# Patient Record
Sex: MALE | Race: BLACK OR AFRICAN AMERICAN | NOT HISPANIC OR LATINO | Employment: OTHER | URBAN - METROPOLITAN AREA
[De-identification: names, ages, dates, MRNs, and addresses within clinical notes are randomized per-mention and may not be internally consistent; named-entity substitution may affect disease eponyms.]

---

## 2017-10-06 ENCOUNTER — TRANSCRIBE ORDERS (OUTPATIENT)
Dept: ADMINISTRATIVE | Facility: HOSPITAL | Age: 77
End: 2017-10-06

## 2017-10-06 ENCOUNTER — LAB (OUTPATIENT)
Dept: LAB | Facility: HOSPITAL | Age: 77
End: 2017-10-06

## 2017-10-06 DIAGNOSIS — A74.9 CHLAMYDIA: ICD-10-CM

## 2017-10-06 DIAGNOSIS — A74.9 CHLAMYDIA: Primary | ICD-10-CM

## 2017-10-06 PROCEDURE — 87491 CHLMYD TRACH DNA AMP PROBE: CPT

## 2017-10-06 PROCEDURE — 87798 DETECT AGENT NOS DNA AMP: CPT

## 2017-10-06 PROCEDURE — 87591 N.GONORRHOEAE DNA AMP PROB: CPT

## 2017-10-10 LAB
C TRACH RRNA SPEC DONR QL NAA+PROBE: NEGATIVE
CONV COMMENT: NORMAL
MYCOPLASMA GENITALIUM NA: NEGATIVE
N GONORRHOEA DNA SPEC QL NAA+PROBE: NEGATIVE

## 2018-03-12 ENCOUNTER — LAB (OUTPATIENT)
Dept: LAB | Facility: HOSPITAL | Age: 78
End: 2018-03-12

## 2018-03-12 ENCOUNTER — CONSULT (OUTPATIENT)
Dept: ONCOLOGY | Facility: CLINIC | Age: 78
End: 2018-03-12

## 2018-03-12 VITALS
RESPIRATION RATE: 16 BRPM | SYSTOLIC BLOOD PRESSURE: 134 MMHG | DIASTOLIC BLOOD PRESSURE: 72 MMHG | OXYGEN SATURATION: 99 % | HEIGHT: 68 IN | BODY MASS INDEX: 28.28 KG/M2 | HEART RATE: 64 BPM | WEIGHT: 186.6 LBS | TEMPERATURE: 99 F

## 2018-03-12 DIAGNOSIS — D47.2 MGUS (MONOCLONAL GAMMOPATHY OF UNKNOWN SIGNIFICANCE): Primary | ICD-10-CM

## 2018-03-12 DIAGNOSIS — D70.8 OTHER NEUTROPENIA (HCC): ICD-10-CM

## 2018-03-12 LAB
B2 MICROGLOB SERPL-MCNC: 1.8 MG/L (ref 0.8–2.2)
BASOPHILS # BLD AUTO: 0.01 10*3/MM3 (ref 0–0.1)
BASOPHILS NFR BLD AUTO: 0.3 % (ref 0–1.1)
DEPRECATED RDW RBC AUTO: 43.1 FL (ref 37–49)
EOSINOPHIL # BLD AUTO: 0.1 10*3/MM3 (ref 0–0.36)
EOSINOPHIL NFR BLD AUTO: 3 % (ref 1–5)
ERYTHROCYTE [DISTWIDTH] IN BLOOD BY AUTOMATED COUNT: 12.4 % (ref 11.7–14.5)
ERYTHROCYTE [SEDIMENTATION RATE] IN BLOOD: 12 MM/HR (ref 0–20)
HCT VFR BLD AUTO: 35 % (ref 40–49)
HGB BLD-MCNC: 12.7 G/DL (ref 13.5–16.5)
IMM GRANULOCYTES # BLD: 0.04 10*3/MM3 (ref 0–0.03)
IMM GRANULOCYTES NFR BLD: 1.2 % (ref 0–0.5)
LYMPHOCYTES # BLD AUTO: 1.35 10*3/MM3 (ref 1–3.5)
LYMPHOCYTES NFR BLD AUTO: 40.7 % (ref 20–49)
MCH RBC QN AUTO: 34.3 PG (ref 27–33)
MCHC RBC AUTO-ENTMCNC: 36.3 G/DL (ref 32–35)
MCV RBC AUTO: 94.6 FL (ref 83–97)
MONOCYTES # BLD AUTO: 0.35 10*3/MM3 (ref 0.25–0.8)
MONOCYTES NFR BLD AUTO: 10.5 % (ref 4–12)
NEUTROPHILS # BLD AUTO: 1.47 10*3/MM3 (ref 1.5–7)
NEUTROPHILS NFR BLD AUTO: 44.3 % (ref 39–75)
NRBC BLD MANUAL-RTO: 0 /100 WBC (ref 0–0)
PLATELET # BLD AUTO: 194 10*3/MM3 (ref 150–375)
PMV BLD AUTO: 9.4 FL (ref 8.9–12.1)
RBC # BLD AUTO: 3.7 10*6/MM3 (ref 4.3–5.5)
WBC NRBC COR # BLD: 3.32 10*3/MM3 (ref 4–10)

## 2018-03-12 PROCEDURE — 36415 COLL VENOUS BLD VENIPUNCTURE: CPT | Performed by: INTERNAL MEDICINE

## 2018-03-12 PROCEDURE — 85652 RBC SED RATE AUTOMATED: CPT | Performed by: INTERNAL MEDICINE

## 2018-03-12 PROCEDURE — 82232 ASSAY OF BETA-2 PROTEIN: CPT | Performed by: INTERNAL MEDICINE

## 2018-03-12 PROCEDURE — 99204 OFFICE O/P NEW MOD 45 MIN: CPT | Performed by: INTERNAL MEDICINE

## 2018-03-12 PROCEDURE — 85025 COMPLETE CBC W/AUTO DIFF WBC: CPT | Performed by: INTERNAL MEDICINE

## 2018-03-12 RX ORDER — ESOMEPRAZOLE MAGNESIUM 40 MG/1
40 CAPSULE, DELAYED RELEASE ORAL
COMMUNITY

## 2018-03-12 RX ORDER — CLOPIDOGREL BISULFATE 75 MG/1
75 TABLET ORAL
COMMUNITY

## 2018-03-12 RX ORDER — PREGABALIN 50 MG/1
50 CAPSULE ORAL DAILY
COMMUNITY

## 2018-03-12 RX ORDER — VALSARTAN AND HYDROCHLOROTHIAZIDE 320; 25 MG/1; MG/1
1 TABLET, FILM COATED ORAL DAILY
COMMUNITY
Start: 2017-12-29 | End: 2020-06-26

## 2018-03-12 RX ORDER — SILDENAFIL CITRATE 20 MG/1
TABLET ORAL
Refills: 3 | COMMUNITY
Start: 2018-02-17

## 2018-03-12 RX ORDER — ATORVASTATIN CALCIUM 40 MG/1
TABLET, FILM COATED ORAL DAILY
COMMUNITY
Start: 2017-12-29

## 2018-03-12 RX ORDER — HYDROCODONE BITARTRATE AND ACETAMINOPHEN 5; 325 MG/1; MG/1
1 TABLET ORAL EVERY 6 HOURS PRN
COMMUNITY

## 2018-03-12 NOTE — PROGRESS NOTES
Subjective     REASON FOR CONSULTATION:  Chronic leukopenia, monoclonal gammopathy  Provide an opinion on any further workup or treatment                             REQUESTING PHYSICIAN:  Eduardo    RECORDS OBTAINED:  Records of the patients history including those obtained from the referring provider were reviewed and summarized in detail.      History of Present Illness   This is a very pleasant 78-year-old man seen previously in our practice by Dr. Jenkins for leukopenia and anemia.  A note from March 2014 also mentions previous finding of IgG paraproteinemia although there are no recent protein studies available in the Epic system.  The patient was last seen by CBC in March 2014.  I did find through our previous [employer] computer system a serum protein electrophoresis from 12/19/2011 showing an M spike 1.4 g/dL IgG kappa.  I could not find previous light chain studies within our records.    The patient returns for continued evaluation.  He states he has been doing well over the past 3 years.  He has had no significant infectious problems.  He denies unusual weight loss or lymphadenopathy.  He denies night sweats.  He remains active.  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  He denies bone pain.    Recent studies with his primary care provider on 2/5/18 showed low IgA 31, low IgM 38 and an IgG of 1457.  The white blood cell count was 2.6 with neutrophils 1253.  Platelet count was normal in the hemoglobin was 12.8.  A protein electrophoresis on 2/8/18 showed a M spike 1.2 g/dL.  Kappa lambda light chain ratio was 23.68.    Past Medical History:   Diagnosis Date   • Arthritis     hands   • CAD (coronary artery disease)    • Diabetes    • GERD (gastroesophageal reflux disease)    • H/O MGUS (monoclonal gammopathy of unknown significance)    • History of colon polyps    • History of herpes zoster 2007   • History of pneumonia    • Hyperlipidemia    • Hypertension    • Neutropenia      "H/O hereditary neutropenia   • Prostate cancer 2007   • Stroke 1997        Past Surgical History:   Procedure Laterality Date   • ANAL FISSURECTOMY     • CATARACT EXTRACTION Bilateral 2009   • COLONOSCOPY  06/2014    cecal polyp, sigmoid diverticulosis   • ENDOSCOPY  2001   • OTHER SURGICAL HISTORY  2007    Decortication and right lower lobe with resection   • PROSTATE BIOPSY  2007   • THORACOSCOPY          No current outpatient prescriptions on file prior to visit.     No current facility-administered medications on file prior to visit.         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        Family History   Problem Relation Age of Onset   • Uterine cancer Mother    • Hypertension Other    • Hyperlipidemia Other    • Arthritis Other    • Stroke Other         Review of Systems   Constitutional: Negative.    HENT: Negative.    Eyes: Negative.    Respiratory: Negative.    Cardiovascular: Negative.    Gastrointestinal: Negative.    Genitourinary: Negative.    Musculoskeletal: Negative.    Skin: Negative.    Allergic/Immunologic: Negative.    Neurological: Negative.    Hematological: Negative.    Psychiatric/Behavioral: Negative.           Objective     Vitals:    03/12/18 1526   BP: 134/72   Pulse: 64   Resp: 16   Temp: 99 °F (37.2 °C)   TempSrc: Oral   SpO2: 99%   Weight: 84.6 kg (186 lb 9.6 oz)   Height: 173 cm (68.11\")   PainSc: 5  Comment: Rt shoulder pain.   PainLoc: Shoulder     Current Status 3/12/2018   ECOG score 0 "       Physical Exam    Con: pleasant, well-appearing man  HEENT: no icterus, no thrush, moist membranes  CV: RRR, S1S2  RESP: CTA bilat, no wheezing  GI: soft, nontender, no splenomegaly, +BS  MS: no edema  SKIN: no petechiae or bruising  NEURO: alert and oriented x3, normal strength, normal muscle tone  PSYCH: normal mood    RECENT LABS:  Hematology WBC   Date Value Ref Range Status   03/12/2018 3.32 (L) 4.00 - 10.00 10*3/mm3 Final     RBC   Date Value Ref Range Status   03/12/2018 3.70 (L) 4.30 - 5.50 10*6/mm3 Final     Hemoglobin   Date Value Ref Range Status   03/12/2018 12.7 (L) 13.5 - 16.5 g/dL Final     Hematocrit   Date Value Ref Range Status   03/12/2018 35.0 (L) 40.0 - 49.0 % Final     Platelets   Date Value Ref Range Status   03/12/2018 194 150 - 375 10*3/mm3 Final        Lab Results   Component Value Date    GLUCOSE 152 (H) 07/17/2015    BUN 32 (H) 07/17/2015    CREATININE 1.46 (H) 07/17/2015    K 4.0 07/17/2015    CO2 19 (L) 07/17/2015    CALCIUM 8.5 (L) 07/17/2015    ALBUMIN 3.6 07/15/2015    AST 61 (H) 07/15/2015    ALT 58 (H) 07/15/2015         Assessment/Plan     1.  Chronic leukopenia: The patient's ANC has run borderline low for a number of years.  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  The current ANC is 1.47, similar to his previous values.  He is having no infectious complications.    4.  MGUS: This was a previous finding on the patient.  A protein electrophoresis from December 2011 showed an M spike 1.4 g/dL IgG kappa.  Most recent protein electrophoresis from his PCP showed an M spike 1.2 g/dL.  Kappa lambda ratio was elevated 23.68 and I could find no comparison in the computer system historically.  Given the stable M spike, this is likely a monoclonal gammopathy of undetermined significance.  Recommended that we further assess with repeat protein led to pheresis, immunofixation, 24 hour  urine for protein and immunofixation, and a skeletal survey.  I will also check a CMP and beta-2 microglobulin today.      3.  Mild normocytic anemia: It appears the patient has stage III chronic kidney disease as the likely cause     I will see the patient back in 2-3 weeks to review results.

## 2018-03-13 LAB
ALBUMIN SERPL-MCNC: 4.1 G/DL (ref 2.9–4.4)
ALBUMIN/GLOB SERPL: 1.3 {RATIO} (ref 0.7–1.7)
ALPHA1 GLOB FLD ELPH-MCNC: 0.2 G/DL (ref 0–0.4)
ALPHA2 GLOB SERPL ELPH-MCNC: 0.5 G/DL (ref 0.4–1)
B-GLOBULIN SERPL ELPH-MCNC: 0.9 G/DL (ref 0.7–1.3)
GAMMA GLOB SERPL ELPH-MCNC: 1.5 G/DL (ref 0.4–1.8)
GLOBULIN SER CALC-MCNC: 3.1 G/DL (ref 2.2–3.9)
IGA SERPL-MCNC: 24 MG/DL (ref 61–437)
IGG SERPL-MCNC: 1548 MG/DL (ref 700–1600)
IGM SERPL-MCNC: 41 MG/DL (ref 15–143)
KAPPA LC SERPL-MCNC: 26.9 MG/L (ref 3.3–19.4)
KAPPA LC/LAMBDA SER: 3.96 {RATIO} (ref 0.26–1.65)
LAMBDA LC FREE SERPL-MCNC: 6.8 MG/L (ref 5.7–26.3)
Lab: ABNORMAL
M-SPIKE: 1.4 G/DL
PROT PATTERN SERPL IFE-IMP: ABNORMAL
PROT SERPL-MCNC: 7.2 G/DL (ref 6–8.5)

## 2018-03-14 LAB — REF LAB TEST METHOD: NORMAL

## 2018-04-02 ENCOUNTER — APPOINTMENT (OUTPATIENT)
Dept: ONCOLOGY | Facility: CLINIC | Age: 78
End: 2018-04-02

## 2018-04-02 ENCOUNTER — APPOINTMENT (OUTPATIENT)
Dept: LAB | Facility: HOSPITAL | Age: 78
End: 2018-04-02

## 2018-04-03 ENCOUNTER — HOSPITAL ENCOUNTER (OUTPATIENT)
Dept: GENERAL RADIOLOGY | Facility: HOSPITAL | Age: 78
Discharge: HOME | End: 2018-04-03
Attending: INTERNAL MEDICINE | Admitting: INTERNAL MEDICINE

## 2018-04-03 DIAGNOSIS — D47.2 MGUS (MONOCLONAL GAMMOPATHY OF UNKNOWN SIGNIFICANCE): ICD-10-CM

## 2018-04-03 DIAGNOSIS — D70.8 OTHER NEUTROPENIA (HCC): ICD-10-CM

## 2018-04-03 PROCEDURE — 77075 RADEX OSSEOUS SURVEY COMPL: CPT

## 2018-04-16 ENCOUNTER — OFFICE VISIT (OUTPATIENT)
Dept: ONCOLOGY | Facility: CLINIC | Age: 78
End: 2018-04-16

## 2018-04-16 ENCOUNTER — APPOINTMENT (OUTPATIENT)
Dept: LAB | Facility: HOSPITAL | Age: 78
End: 2018-04-16

## 2018-04-16 VITALS
WEIGHT: 186.6 LBS | TEMPERATURE: 97.6 F | RESPIRATION RATE: 16 BRPM | BODY MASS INDEX: 28.28 KG/M2 | HEIGHT: 68 IN | DIASTOLIC BLOOD PRESSURE: 68 MMHG | SYSTOLIC BLOOD PRESSURE: 128 MMHG | HEART RATE: 53 BPM | OXYGEN SATURATION: 99 %

## 2018-04-16 DIAGNOSIS — D63.1 ANEMIA IN STAGE 3 CHRONIC KIDNEY DISEASE (HCC): ICD-10-CM

## 2018-04-16 DIAGNOSIS — D47.2 MGUS (MONOCLONAL GAMMOPATHY OF UNKNOWN SIGNIFICANCE): Primary | ICD-10-CM

## 2018-04-16 DIAGNOSIS — D70.8 OTHER NEUTROPENIA (HCC): ICD-10-CM

## 2018-04-16 DIAGNOSIS — N18.30 ANEMIA IN STAGE 3 CHRONIC KIDNEY DISEASE (HCC): ICD-10-CM

## 2018-04-16 PROCEDURE — 99214 OFFICE O/P EST MOD 30 MIN: CPT | Performed by: INTERNAL MEDICINE

## 2018-04-16 PROCEDURE — G0463 HOSPITAL OUTPT CLINIC VISIT: HCPCS | Performed by: INTERNAL MEDICINE

## 2018-10-03 ENCOUNTER — APPOINTMENT (OUTPATIENT)
Dept: LAB | Facility: HOSPITAL | Age: 78
End: 2018-10-03

## 2018-10-05 ENCOUNTER — APPOINTMENT (OUTPATIENT)
Dept: LAB | Facility: HOSPITAL | Age: 78
End: 2018-10-05

## 2018-10-08 ENCOUNTER — LAB (OUTPATIENT)
Dept: LAB | Facility: HOSPITAL | Age: 78
End: 2018-10-08

## 2018-10-08 DIAGNOSIS — N18.30 ANEMIA IN STAGE 3 CHRONIC KIDNEY DISEASE (HCC): ICD-10-CM

## 2018-10-08 DIAGNOSIS — D47.2 MGUS (MONOCLONAL GAMMOPATHY OF UNKNOWN SIGNIFICANCE): ICD-10-CM

## 2018-10-08 DIAGNOSIS — D70.8 OTHER NEUTROPENIA (HCC): ICD-10-CM

## 2018-10-08 DIAGNOSIS — D63.1 ANEMIA IN STAGE 3 CHRONIC KIDNEY DISEASE (HCC): ICD-10-CM

## 2018-10-08 LAB
ALBUMIN SERPL-MCNC: 4.1 G/DL (ref 3.5–5.2)
ALBUMIN/GLOB SERPL: 1.6 G/DL (ref 1.1–2.4)
ALP SERPL-CCNC: 45 U/L (ref 38–116)
ALT SERPL W P-5'-P-CCNC: 41 U/L (ref 0–41)
ANION GAP SERPL CALCULATED.3IONS-SCNC: 9.3 MMOL/L
AST SERPL-CCNC: 36 U/L (ref 0–40)
BASOPHILS # BLD AUTO: 0.01 10*3/MM3 (ref 0–0.1)
BASOPHILS NFR BLD AUTO: 0.4 % (ref 0–1.1)
BILIRUB SERPL-MCNC: 0.6 MG/DL (ref 0.1–1.2)
BUN BLD-MCNC: 18 MG/DL (ref 6–20)
BUN/CREAT SERPL: 17 (ref 7.3–30)
CALCIUM SPEC-SCNC: 8.9 MG/DL (ref 8.5–10.2)
CHLORIDE SERPL-SCNC: 105 MMOL/L (ref 98–107)
CO2 SERPL-SCNC: 26.7 MMOL/L (ref 22–29)
CREAT BLD-MCNC: 1.06 MG/DL (ref 0.7–1.3)
DEPRECATED RDW RBC AUTO: 42.4 FL (ref 37–49)
EOSINOPHIL # BLD AUTO: 0.07 10*3/MM3 (ref 0–0.36)
EOSINOPHIL NFR BLD AUTO: 3 % (ref 1–5)
ERYTHROCYTE [DISTWIDTH] IN BLOOD BY AUTOMATED COUNT: 12 % (ref 11.7–14.5)
FERRITIN SERPL-MCNC: 65.9 NG/ML (ref 30–400)
FOLATE SERPL-MCNC: 8.32 NG/ML (ref 4.78–24.2)
GFR SERPL CREATININE-BSD FRML MDRD: 82 ML/MIN/1.73
GLOBULIN UR ELPH-MCNC: 2.6 GM/DL (ref 1.8–3.5)
GLUCOSE BLD-MCNC: 120 MG/DL (ref 74–124)
HCT VFR BLD AUTO: 36.6 % (ref 40–49)
HGB BLD-MCNC: 12.8 G/DL (ref 13.5–16.5)
IMM GRANULOCYTES # BLD: 0 10*3/MM3 (ref 0–0.03)
IMM GRANULOCYTES NFR BLD: 0 % (ref 0–0.5)
IRON 24H UR-MRATE: 74 MCG/DL (ref 59–158)
IRON SATN MFR SERPL: 26 % (ref 14–48)
LYMPHOCYTES # BLD AUTO: 1.08 10*3/MM3 (ref 1–3.5)
LYMPHOCYTES NFR BLD AUTO: 45.8 % (ref 20–49)
MCH RBC QN AUTO: 33.6 PG (ref 27–33)
MCHC RBC AUTO-ENTMCNC: 35 G/DL (ref 32–35)
MCV RBC AUTO: 96.1 FL (ref 83–97)
MONOCYTES # BLD AUTO: 0.28 10*3/MM3 (ref 0.25–0.8)
MONOCYTES NFR BLD AUTO: 11.9 % (ref 4–12)
NEUTROPHILS # BLD AUTO: 0.92 10*3/MM3 (ref 1.5–7)
NEUTROPHILS NFR BLD AUTO: 38.9 % (ref 39–75)
NRBC BLD MANUAL-RTO: 0 /100 WBC (ref 0–0)
PLATELET # BLD AUTO: 176 10*3/MM3 (ref 150–375)
PMV BLD AUTO: 9.3 FL (ref 8.9–12.1)
POTASSIUM BLD-SCNC: 3.8 MMOL/L (ref 3.5–4.7)
PROT SERPL-MCNC: 6.7 G/DL (ref 6.3–8)
RBC # BLD AUTO: 3.81 10*6/MM3 (ref 4.3–5.5)
SODIUM BLD-SCNC: 141 MMOL/L (ref 134–145)
TIBC SERPL-MCNC: 280 MCG/DL (ref 249–505)
TRANSFERRIN SERPL-MCNC: 200 MG/DL (ref 200–360)
VIT B12 BLD-MCNC: 488 PG/ML (ref 211–946)
WBC NRBC COR # BLD: 2.36 10*3/MM3 (ref 4–10)

## 2018-10-08 PROCEDURE — 82728 ASSAY OF FERRITIN: CPT | Performed by: INTERNAL MEDICINE

## 2018-10-08 PROCEDURE — 36415 COLL VENOUS BLD VENIPUNCTURE: CPT | Performed by: INTERNAL MEDICINE

## 2018-10-08 PROCEDURE — 84466 ASSAY OF TRANSFERRIN: CPT | Performed by: INTERNAL MEDICINE

## 2018-10-08 PROCEDURE — 82746 ASSAY OF FOLIC ACID SERUM: CPT | Performed by: INTERNAL MEDICINE

## 2018-10-08 PROCEDURE — 85025 COMPLETE CBC W/AUTO DIFF WBC: CPT | Performed by: INTERNAL MEDICINE

## 2018-10-08 PROCEDURE — 83540 ASSAY OF IRON: CPT | Performed by: INTERNAL MEDICINE

## 2018-10-08 PROCEDURE — 80053 COMPREHEN METABOLIC PANEL: CPT | Performed by: INTERNAL MEDICINE

## 2018-10-08 PROCEDURE — 82607 VITAMIN B-12: CPT | Performed by: INTERNAL MEDICINE

## 2018-10-09 LAB
ALBUMIN SERPL-MCNC: 3.9 G/DL (ref 2.9–4.4)
ALBUMIN/GLOB SERPL: 1.3 {RATIO} (ref 0.7–1.7)
ALPHA1 GLOB FLD ELPH-MCNC: 0.2 G/DL (ref 0–0.4)
ALPHA2 GLOB SERPL ELPH-MCNC: 0.5 G/DL (ref 0.4–1)
B-GLOBULIN SERPL ELPH-MCNC: 0.8 G/DL (ref 0.7–1.3)
GAMMA GLOB SERPL ELPH-MCNC: 1.6 G/DL (ref 0.4–1.8)
GLOBULIN SER CALC-MCNC: 3 G/DL (ref 2.2–3.9)
IGA SERPL-MCNC: 24 MG/DL (ref 61–437)
IGG SERPL-MCNC: 1655 MG/DL (ref 700–1600)
IGM SERPL-MCNC: 41 MG/DL (ref 15–143)
KAPPA LC SERPL-MCNC: 35 MG/L (ref 3.3–19.4)
KAPPA LC/LAMBDA SER: 4.55 {RATIO} (ref 0.26–1.65)
LAMBDA LC FREE SERPL-MCNC: 7.7 MG/L (ref 5.7–26.3)
Lab: ABNORMAL
M-SPIKE: 1.4 G/DL
PROT PATTERN SERPL IFE-IMP: ABNORMAL
PROT SERPL-MCNC: 6.9 G/DL (ref 6–8.5)

## 2018-10-10 ENCOUNTER — APPOINTMENT (OUTPATIENT)
Dept: LAB | Facility: HOSPITAL | Age: 78
End: 2018-10-10

## 2018-10-10 ENCOUNTER — OFFICE VISIT (OUTPATIENT)
Dept: ONCOLOGY | Facility: CLINIC | Age: 78
End: 2018-10-10

## 2018-10-10 VITALS
HEART RATE: 64 BPM | BODY MASS INDEX: 28.34 KG/M2 | DIASTOLIC BLOOD PRESSURE: 88 MMHG | RESPIRATION RATE: 14 BRPM | TEMPERATURE: 98.2 F | SYSTOLIC BLOOD PRESSURE: 130 MMHG | OXYGEN SATURATION: 100 % | HEIGHT: 68 IN | WEIGHT: 187 LBS

## 2018-10-10 DIAGNOSIS — N18.30 ANEMIA IN STAGE 3 CHRONIC KIDNEY DISEASE (HCC): ICD-10-CM

## 2018-10-10 DIAGNOSIS — D63.1 ANEMIA IN STAGE 3 CHRONIC KIDNEY DISEASE (HCC): ICD-10-CM

## 2018-10-10 DIAGNOSIS — D70.8 OTHER NEUTROPENIA (HCC): Primary | ICD-10-CM

## 2018-10-10 DIAGNOSIS — D47.2 MGUS (MONOCLONAL GAMMOPATHY OF UNKNOWN SIGNIFICANCE): ICD-10-CM

## 2018-10-10 PROCEDURE — 99213 OFFICE O/P EST LOW 20 MIN: CPT | Performed by: INTERNAL MEDICINE

## 2018-10-10 PROCEDURE — G0463 HOSPITAL OUTPT CLINIC VISIT: HCPCS | Performed by: INTERNAL MEDICINE

## 2018-10-10 NOTE — PROGRESS NOTES
Subjective     REASON FOR FOLLOW-UP:  Chronic leukopenia, monoclonal gammopathy  Provide an opinion on any further workup or treatment                             REQUESTING PHYSICIAN:  Eduardo    RECORDS OBTAINED:  Records of the patients history including those obtained from the referring provider were reviewed and summarized in detail.      History of Present Illness   This is a very pleasant 78-year-old man seen previously in our practice by Dr. Jenkins for leukopenia and anemiaand IgG paraproteinemia (MGUS).  Labs from our previous [employer] computer system showed a serum protein electrophoresis from 12/19/2011 showing an M spike 1.4 g/dL IgG kappa.  I could not find previous light chain studies within our records.    The patient returns for continued evaluation.   He has had no significant infectious problems.  He denies unusual weight loss or lymphadenopathy.  He denies night sweats.  He remains active.  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  He denies bone pain.    In return today, he is doing well other than mild wrist pain related to arthritis.  He reports no fevers, chills or recent infections.  He denies shortness of breath or lightheadedness.      Past Medical History:   Diagnosis Date   • Arthritis     hands   • CAD (coronary artery disease)    • Diabetes (CMS/HCC)    • GERD (gastroesophageal reflux disease)    • H/O MGUS (monoclonal gammopathy of unknown significance)    • History of colon polyps    • History of herpes zoster 2007   • History of pneumonia    • Hyperlipidemia    • Hypertension    • Neutropenia (CMS/HCC)     H/O hereditary neutropenia   • Prostate cancer (CMS/HCC) 2007   • Stroke (CMS/HCC) 1997        Past Surgical History:   Procedure Laterality Date   • ANAL FISSURECTOMY     • CATARACT EXTRACTION Bilateral 2009   • COLONOSCOPY  06/2014    cecal polyp, sigmoid diverticulosis   • ENDOSCOPY  2001   • OTHER SURGICAL HISTORY  2007    Decortication and right  lower lobe with resection   • PROSTATE BIOPSY  2007   • THORACOSCOPY          Current Outpatient Prescriptions on File Prior to Visit   Medication Sig Dispense Refill   • atorvastatin (LIPITOR) 40 MG tablet      • clopidogrel (PLAVIX) 75 MG tablet Take 75 mg by mouth.     • esomeprazole (nexIUM) 40 MG capsule Take 40 mg by mouth.     • HYDROcodone-acetaminophen (NORCO) 5-325 MG per tablet Take 1 tablet by mouth Every 6 (Six) Hours As Needed.     • Multiple Vitamins-Minerals (MULTIVITAMIN ADULTS 50+ PO) Take  by mouth.     • pregabalin (LYRICA) 50 MG capsule Take 50 mg by mouth 2 (Two) Times a Day.     • sildenafil (REVATIO) 20 MG tablet TK 2 TS PO QD PRN  3   • valsartan-hydrochlorothiazide (DIOVAN-HCT) 320-25 MG per tablet        No current facility-administered medications on file prior to visit.         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        Family History   Problem Relation Age of Onset   • Uterine cancer Mother    • Hypertension Other    • Hyperlipidemia Other    • Arthritis Other    • Stroke Other    • Heart disease Sister    • Hypertension Brother         Review of Systems   Constitutional: Negative.    HENT: Negative.    Eyes: Negative.    Respiratory: Negative.    Cardiovascular: Negative.    Gastrointestinal: Negative.    Genitourinary: Negative.    Musculoskeletal: Positive for arthralgias.   Skin: Negative.    Allergic/Immunologic: Negative.    Neurological:  "Negative.    Hematological: Negative.    Psychiatric/Behavioral: Negative.     ROS unchanged-10/10/18      Objective     Vitals:    10/10/18 1125   BP: 130/88   Pulse: 64   Resp: 14   Temp: 98.2 °F (36.8 °C)   TempSrc: Oral   SpO2: 100%   Weight: 84.8 kg (187 lb)   Height: 173 cm (68.11\")   PainSc: 0-No pain     Current Status 10/10/2018   ECOG score 0       Physical Exam    Con: pleasant, well-appearing man  HEENT: no icterus, no thrush, moist membranes  CV: RRR, S1S2  RESP: CTA bilat, no wheezing  GI: soft, nontender, no splenomegaly, +BS  MS: no edema  SKIN: no petechiae or bruising  NEURO: alert and oriented x3, normal strength, normal muscle tone  PSYCH: normal mood    Physical exam performed and unchanged from above-10/10/18    RECENT LABS:  Hematology WBC   Date Value Ref Range Status   10/08/2018 2.36 (L) 4.00 - 10.00 10*3/mm3 Final     RBC   Date Value Ref Range Status   10/08/2018 3.81 (L) 4.30 - 5.50 10*6/mm3 Final     Hemoglobin   Date Value Ref Range Status   10/08/2018 12.8 (L) 13.5 - 16.5 g/dL Final     Hematocrit   Date Value Ref Range Status   10/08/2018 36.6 (L) 40.0 - 49.0 % Final     Platelets   Date Value Ref Range Status   10/08/2018 176 150 - 375 10*3/mm3 Final        Lab Results   Component Value Date    GLUCOSE 120 10/08/2018    BUN 18 10/08/2018    CREATININE 1.06 10/08/2018    EGFRIFAFRI 82 10/08/2018    BCR 17.0 10/08/2018    K 3.8 10/08/2018    CO2 26.7 10/08/2018    CALCIUM 8.9 10/08/2018    PROTENTOTREF 6.9 10/08/2018    ALBUMIN 4.10 10/08/2018    ALBUMIN 3.9 10/08/2018    LABIL2 1.3 10/08/2018    AST 36 10/08/2018    ALT 41 10/08/2018     Peripheral blood flow cytometry 3/12/18: No abnormal myeloid or lymphoid populations identified  B2M.8  FLC ratio 3.96-->4.55  ESR 12  M-spike 1.4 g/dL IgG kappa-->1.4 g/dL    Skeletal survey 3/12/18:  Negative.  I personally reviewed images and see no lytic lesions.      Assessment/Plan     1.  Chronic leukopenia: The patient's ANC has run " borderline low for a number of years.  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  ANC is lower/worse this visit 0.92.  I recommended we repeat his CBC in 5-6 weeks.  If the ANC remains was low, bone marrow evaluation will likely be advised.    4.  MGUS:  M spike this visit stable 1.4 g/dL IgG    3.  Mild normocytic anemia:  the patient has stage III chronic kidney disease as the likely cause.  Iron studies, B12, folic acid without obvious deficiency.  Hemoglobin stable 12.8.

## 2018-11-16 ENCOUNTER — APPOINTMENT (OUTPATIENT)
Dept: LAB | Facility: HOSPITAL | Age: 78
End: 2018-11-16

## 2018-11-16 ENCOUNTER — APPOINTMENT (OUTPATIENT)
Dept: ONCOLOGY | Facility: CLINIC | Age: 78
End: 2018-11-16

## 2018-11-19 ENCOUNTER — OFFICE VISIT (OUTPATIENT)
Dept: ONCOLOGY | Facility: CLINIC | Age: 78
End: 2018-11-19

## 2018-11-19 ENCOUNTER — LAB (OUTPATIENT)
Dept: LAB | Facility: HOSPITAL | Age: 78
End: 2018-11-19

## 2018-11-19 VITALS
RESPIRATION RATE: 20 BRPM | OXYGEN SATURATION: 97 % | DIASTOLIC BLOOD PRESSURE: 78 MMHG | HEART RATE: 63 BPM | SYSTOLIC BLOOD PRESSURE: 172 MMHG | HEIGHT: 68 IN | TEMPERATURE: 97.7 F | BODY MASS INDEX: 29.45 KG/M2 | WEIGHT: 194.3 LBS

## 2018-11-19 DIAGNOSIS — D63.1 ANEMIA IN STAGE 3 CHRONIC KIDNEY DISEASE (HCC): ICD-10-CM

## 2018-11-19 DIAGNOSIS — N18.30 ANEMIA IN STAGE 3 CHRONIC KIDNEY DISEASE (HCC): ICD-10-CM

## 2018-11-19 DIAGNOSIS — D70.8 OTHER NEUTROPENIA (HCC): ICD-10-CM

## 2018-11-19 DIAGNOSIS — D47.2 MGUS (MONOCLONAL GAMMOPATHY OF UNKNOWN SIGNIFICANCE): ICD-10-CM

## 2018-11-19 DIAGNOSIS — D47.2 MGUS (MONOCLONAL GAMMOPATHY OF UNKNOWN SIGNIFICANCE): Primary | ICD-10-CM

## 2018-11-19 LAB
BASOPHILS # BLD AUTO: 0.02 10*3/MM3 (ref 0–0.1)
BASOPHILS NFR BLD AUTO: 0.8 % (ref 0–1.1)
DEPRECATED RDW RBC AUTO: 42.1 FL (ref 37–49)
EOSINOPHIL # BLD AUTO: 0.04 10*3/MM3 (ref 0–0.36)
EOSINOPHIL NFR BLD AUTO: 1.6 % (ref 1–5)
ERYTHROCYTE [DISTWIDTH] IN BLOOD BY AUTOMATED COUNT: 12 % (ref 11.7–14.5)
HCT VFR BLD AUTO: 36.1 % (ref 40–49)
HGB BLD-MCNC: 12.9 G/DL (ref 13.5–16.5)
IMM GRANULOCYTES # BLD: 0.02 10*3/MM3 (ref 0–0.03)
IMM GRANULOCYTES NFR BLD: 0.8 % (ref 0–0.5)
LYMPHOCYTES # BLD AUTO: 1.13 10*3/MM3 (ref 1–3.5)
LYMPHOCYTES NFR BLD AUTO: 46.1 % (ref 20–49)
MCH RBC QN AUTO: 33.9 PG (ref 27–33)
MCHC RBC AUTO-ENTMCNC: 35.7 G/DL (ref 32–35)
MCV RBC AUTO: 94.8 FL (ref 83–97)
MONOCYTES # BLD AUTO: 0.3 10*3/MM3 (ref 0.25–0.8)
MONOCYTES NFR BLD AUTO: 12.2 % (ref 4–12)
NEUTROPHILS # BLD AUTO: 0.94 10*3/MM3 (ref 1.5–7)
NEUTROPHILS NFR BLD AUTO: 38.5 % (ref 39–75)
NRBC BLD MANUAL-RTO: 0 /100 WBC (ref 0–0)
PLATELET # BLD AUTO: 181 10*3/MM3 (ref 150–375)
PMV BLD AUTO: 9.7 FL (ref 8.9–12.1)
RBC # BLD AUTO: 3.81 10*6/MM3 (ref 4.3–5.5)
WBC NRBC COR # BLD: 2.45 10*3/MM3 (ref 4–10)

## 2018-11-19 PROCEDURE — 85025 COMPLETE CBC W/AUTO DIFF WBC: CPT | Performed by: INTERNAL MEDICINE

## 2018-11-19 PROCEDURE — 36415 COLL VENOUS BLD VENIPUNCTURE: CPT | Performed by: INTERNAL MEDICINE

## 2018-11-19 PROCEDURE — 99214 OFFICE O/P EST MOD 30 MIN: CPT | Performed by: INTERNAL MEDICINE

## 2018-11-19 NOTE — PROGRESS NOTES
Subjective     REASON FOR FOLLOW-UP:  Chronic leukopenia, monoclonal gammopathy  Provide an opinion on any further workup or treatment                             REQUESTING PHYSICIAN:  Eduardo    RECORDS OBTAINED:  Records of the patients history including those obtained from the referring provider were reviewed and summarized in detail.      History of Present Illness   This is a very pleasant 78-year-old man seen previously in our practice by Dr. Jenkins for leukopenia and anemiaand IgG paraproteinemia (MGUS).  Labs from our previous [employer] computer system showed a serum protein electrophoresis from 12/19/2011 showing an M spike 1.4 g/dL IgG kappa.  I could not find previous light chain studies within our records.    The patient returns for continued evaluation.   He has had no significant infectious problems.  He denies unusual weight loss or lymphadenopathy.  He denies night sweats.  He remains active.  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  He denies bone pain.    In return today, he is doing well other than mild wrist pain related to arthritis and carpal tunnel syndrome.  He reports no fevers, chills or recent infections.  He denies shortness of breath or lightheadedness.      Past Medical History:   Diagnosis Date   • Arthritis     hands   • CAD (coronary artery disease)    • Diabetes (CMS/HCC)    • GERD (gastroesophageal reflux disease)    • H/O MGUS (monoclonal gammopathy of unknown significance)    • History of colon polyps    • History of herpes zoster 2007   • History of pneumonia    • Hyperlipidemia    • Hypertension    • Neutropenia (CMS/HCC)     H/O hereditary neutropenia   • Prostate cancer (CMS/HCC) 2007   • Stroke (CMS/HCC) 1997        Past Surgical History:   Procedure Laterality Date   • ANAL FISSURECTOMY     • CATARACT EXTRACTION Bilateral 2009   • COLONOSCOPY  06/2014    cecal polyp, sigmoid diverticulosis   • ENDOSCOPY  2001   • OTHER SURGICAL HISTORY  2007     Decortication and right lower lobe with resection   • PROSTATE BIOPSY  2007   • THORACOSCOPY          Current Outpatient Medications on File Prior to Visit   Medication Sig Dispense Refill   • atorvastatin (LIPITOR) 40 MG tablet      • clopidogrel (PLAVIX) 75 MG tablet Take 75 mg by mouth.     • esomeprazole (nexIUM) 40 MG capsule Take 40 mg by mouth.     • HYDROcodone-acetaminophen (NORCO) 5-325 MG per tablet Take 1 tablet by mouth Every 6 (Six) Hours As Needed.     • Multiple Vitamins-Minerals (MULTIVITAMIN ADULTS 50+ PO) Take  by mouth.     • pregabalin (LYRICA) 50 MG capsule Take 50 mg by mouth 2 (Two) Times a Day.     • sildenafil (REVATIO) 20 MG tablet TK 2 TS PO QD PRN  3   • valsartan-hydrochlorothiazide (DIOVAN-HCT) 320-25 MG per tablet        No current facility-administered medications on file prior to visit.         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        Family History   Problem Relation Age of Onset   • Uterine cancer Mother    • Hypertension Other    • Hyperlipidemia Other    • Arthritis Other    • Stroke Other    • Heart disease Sister    • Hypertension Brother         Review of Systems   Constitutional: Negative.    HENT: Negative.    Eyes: Negative.    Respiratory: Negative.    Cardiovascular: Negative.    Gastrointestinal: Negative.    Genitourinary: Negative.   "  Musculoskeletal: Positive for arthralgias.   Skin: Negative.    Allergic/Immunologic: Negative.    Neurological: Negative.    Hematological: Negative.    Psychiatric/Behavioral: Negative.     ROS unchanged-18      Objective     Vitals:    18 1205   BP: 172/78   Pulse: 63   Resp: 20   Temp: 97.7 °F (36.5 °C)   TempSrc: Oral   SpO2: 97%   Weight: 88.1 kg (194 lb 4.8 oz)   Height: 173 cm (68.11\")   PainSc: 0-No pain     Current Status 2018   ECOG score 0       Physical Exam    Con: pleasant, well-appearing man  HEENT: no icterus, no thrush, moist membranes  CV: RRR, S1S2  RESP: CTA bilat, no wheezing  GI: soft, nontender, no splenomegaly, +BS  MS: no edema  SKIN: no petechiae or bruising  NEURO: alert and oriented x3, normal strength, normal muscle tone  PSYCH: normal mood    Physical exam performed and unchanged from above-18    RECENT LABS:  Hematology WBC   Date Value Ref Range Status   2018 2.45 (L) 4.00 - 10.00 10*3/mm3 Final     RBC   Date Value Ref Range Status   2018 3.81 (L) 4.30 - 5.50 10*6/mm3 Final     Hemoglobin   Date Value Ref Range Status   2018 12.9 (L) 13.5 - 16.5 g/dL Final     Hematocrit   Date Value Ref Range Status   2018 36.1 (L) 40.0 - 49.0 % Final     Platelets   Date Value Ref Range Status   2018 181 150 - 375 10*3/mm3 Final        Lab Results   Component Value Date    GLUCOSE 120 10/08/2018    BUN 18 10/08/2018    CREATININE 1.06 10/08/2018    EGFRIFAFRI 82 10/08/2018    BCR 17.0 10/08/2018    K 3.8 10/08/2018    CO2 26.7 10/08/2018    CALCIUM 8.9 10/08/2018    PROTENTOTREF 6.9 10/08/2018    ALBUMIN 4.10 10/08/2018    ALBUMIN 3.9 10/08/2018    LABIL2 1.3 10/08/2018    AST 36 10/08/2018    ALT 41 10/08/2018     Peripheral blood flow cytometry 3/12/18: No abnormal myeloid or lymphoid populations identified  B2M.8  FLC ratio 3.96-->4.55  ESR 12  M-spike 1.4 g/dL IgG kappa-->1.4 g/dL    Skeletal survey 3/12/18:  Negative.  I personally " reviewed images and see no lytic lesions.      Assessment/Plan     1.  Chronic leukopenia: The patient's ANC has run borderline low for a number of years.  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  His ANC has now worsened however running persistently less than 1.0.  Thankfully he is asymptomatic.  I recommended and ordered a bone marrow exam for further evaluation.  I explained the procedure to the patient including risk of bleeding, bruising, infection.  He was agreeable to proceed.    4.  MGUS:  M spike this visit stable 1.4 g/dL IgG.  This will also be further assessed with a bone marrow exam to make sure there is not bone marrow evidence of multiple myeloma    3.  Mild normocytic anemia:  the patient has stage III chronic kidney disease as the likely cause.  Iron studies, B12, folic acid without obvious deficiency.  Hemoglobin stable 12.8.    A CT-guided bone marrow exam has been ordered and I will see the patient back 2 weeks after the procedure to review results.

## 2018-11-21 ENCOUNTER — TELEPHONE (OUTPATIENT)
Dept: ONCOLOGY | Facility: CLINIC | Age: 78
End: 2018-11-21

## 2018-11-28 ENCOUNTER — HOSPITAL ENCOUNTER (OUTPATIENT)
Dept: CT IMAGING | Facility: HOSPITAL | Age: 78
End: 2018-11-28
Attending: INTERNAL MEDICINE

## 2019-01-04 ENCOUNTER — HOSPITAL ENCOUNTER (OUTPATIENT)
Dept: CT IMAGING | Facility: HOSPITAL | Age: 79
End: 2019-01-04
Attending: INTERNAL MEDICINE

## 2019-01-11 ENCOUNTER — HOSPITAL ENCOUNTER (OUTPATIENT)
Dept: CT IMAGING | Facility: HOSPITAL | Age: 79
Discharge: HOME | End: 2019-01-11
Attending: INTERNAL MEDICINE | Admitting: INTERNAL MEDICINE

## 2019-01-11 VITALS
TEMPERATURE: 97.4 F | HEART RATE: 61 BPM | RESPIRATION RATE: 18 BRPM | OXYGEN SATURATION: 98 % | HEIGHT: 71 IN | WEIGHT: 175 LBS | BODY MASS INDEX: 24.5 KG/M2 | SYSTOLIC BLOOD PRESSURE: 155 MMHG | DIASTOLIC BLOOD PRESSURE: 81 MMHG

## 2019-01-11 DIAGNOSIS — D70.8 OTHER NEUTROPENIA (HCC): ICD-10-CM

## 2019-01-11 DIAGNOSIS — D47.2 MGUS (MONOCLONAL GAMMOPATHY OF UNKNOWN SIGNIFICANCE): ICD-10-CM

## 2019-01-11 PROCEDURE — 88182 CELL MARKER STUDY: CPT

## 2019-01-11 PROCEDURE — 88313 SPECIAL STAINS GROUP 2: CPT | Performed by: INTERNAL MEDICINE

## 2019-01-11 PROCEDURE — 88341 IMHCHEM/IMCYTCHM EA ADD ANTB: CPT | Performed by: INTERNAL MEDICINE

## 2019-01-11 PROCEDURE — 88311 DECALCIFY TISSUE: CPT | Performed by: INTERNAL MEDICINE

## 2019-01-11 PROCEDURE — 88341 IMHCHEM/IMCYTCHM EA ADD ANTB: CPT

## 2019-01-11 PROCEDURE — 88342 IMHCHEM/IMCYTCHM 1ST ANTB: CPT | Performed by: INTERNAL MEDICINE

## 2019-01-11 PROCEDURE — 88185 FLOWCYTOMETRY/TC ADD-ON: CPT

## 2019-01-11 PROCEDURE — 88184 FLOWCYTOMETRY/ TC 1 MARKER: CPT

## 2019-01-11 PROCEDURE — 88305 TISSUE EXAM BY PATHOLOGIST: CPT | Performed by: INTERNAL MEDICINE

## 2019-01-11 PROCEDURE — 77012 CT SCAN FOR NEEDLE BIOPSY: CPT

## 2019-01-11 RX ORDER — ALPRAZOLAM 0.5 MG/1
0.5 TABLET ORAL ONCE
Status: DISCONTINUED | OUTPATIENT
Start: 2019-01-11 | End: 2019-01-12 | Stop reason: HOSPADM

## 2019-01-11 RX ORDER — LIDOCAINE HYDROCHLORIDE 10 MG/ML
20 INJECTION, SOLUTION INFILTRATION; PERINEURAL ONCE
Status: COMPLETED | OUTPATIENT
Start: 2019-01-11 | End: 2019-01-11

## 2019-01-11 RX ADMIN — LIDOCAINE HYDROCHLORIDE 20 ML: 10 INJECTION, SOLUTION INFILTRATION; PERINEURAL at 09:03

## 2019-01-11 NOTE — DISCHARGE INSTRUCTIONS
EDUCATION /DISCHARGE INSTRUCTIONS  CT/US guided biopsy:  A biopsy is a procedure done to remove tissue for further analysis.  Before images are taken to locate the target area.  Images can be obtained using ultrasound, CT or MRI.  A physician will clean your skin with antiseptic soap, place a sterile towel around the site and administer a local anesthetic to numb the area.  The physician will then insert a special needle.  Sometimes images are taken of the needle after it is inserted to ensure the needle is in the correct area to be biopsied.   A sample is obtained and sent to the laboratory for study.  Occasionally the laboratory is unable to make a diagnosis from the sample and the procedure may need to be repeated.  Within a week the radiologist will send a report to your physician.  A pathologist will also examine the tissue and send a report.      Risks of the procedure include but are not limited to:   *  Bleeding    *  Infection   *  Puncture of surrounding organs *  Death     *  Lung collapse if the biopsy is near the chest which may require insertion of a       tube to re-inflate the lung if severe.      Benefits of the procedure:  Using x-ray helps to locate the area that requires a biopsy. The procedure is less invasive than a surgical procedure, there are no large incisions and it does not require anesthesia.      Alternatives to the procedure:  A biopsy can be performed surgically.  Risks of a surgical biopsy include exposure to anesthesia, infection, excessive bleeding and injury to abdominal organs.  A benefit of surgical biopsy is the ability to see the area to be biopsied and remove of a larger piece of tissue.    THIS EDUCATION INFORMATION WAS REVIEWED PRIOR TO PROCEDURE AND CONSENT. Patient initials__________________Time______0757_____________    Post Procedure:    *  Expect the biopsy site may be tender up to one week.    *  Rest today (no pushing pulling or straining).   *  Slowly increase  activity tomorrow.    *  If you received sedation do not drive for 24 hours.   *  Keep dressing clean and dry.   *  Leave dressing on puncture site for 24 hours.    *  You may shower when dressing removed.    Call your doctor if experiencing:   *  Signs of infection such as redness, swelling, excessive pain and / or foul        smelling drainage from the puncture site.   *  Chills or fever over 101 degrees (by mouth).   *  Unrelieved pain.   *  Any new or severe symptoms.   *  If experiencing sudden / severe shortness of breath or chest pain go to the       nearest emergency room.     Following the procedure:     Follow-up with the ordering physician as directed.    Continue to take other medications as directed by your physician unless    otherwise instructed.   If applicable, resume taking your blood thinners (PLAVIX) or Aspirin on ____1000 1/12/19_______.      If you have any concerns please call the Radiology Nurses Desk at [phone].  You are the most important factor in your recovery.  Follow the above instructions carefully.

## 2019-01-11 NOTE — H&P
Name: Antoine Story ADMIT: (Not on file)   : 1940  PCP: Mg lCark MD    MRN: [id number] LOS: 0 days   AGE/SEX: 78 y.o. male  ROOM: Room/bed info not found       Chief complaint MONOCLONAL GAMMOPATHY    Present Illness or Internal History:  Patient is a 78 y.o. male presents with . MONOCLONAL GAMMOPATHY    Past Surgical History:  Past Surgical History:   Procedure Laterality Date   • ANAL FISSURECTOMY     • CATARACT EXTRACTION Bilateral    • COLONOSCOPY  2014    cecal polyp, sigmoid diverticulosis   • ENDOSCOPY     • OTHER SURGICAL HISTORY      Decortication and right lower lobe with resection   • PROSTATE BIOPSY     • THORACOSCOPY         Past Medical History:  Past Medical History:   Diagnosis Date   • Arthritis     hands   • CAD (coronary artery disease)    • Diabetes (CMS/HCC)    • GERD (gastroesophageal reflux disease)    • H/O MGUS (monoclonal gammopathy of unknown significance)    • History of colon polyps    • History of herpes zoster    • History of pneumonia    • Hyperlipidemia    • Hypertension    • Neutropenia (CMS/HCC)     H/O hereditary neutropenia   • Prostate cancer (CMS/HCC)    • Stroke (CMS/HCC)        Home Medications:    (Not in a hospital admission)    Allergies:  Patient has no known allergies.    Family History:  Family History   Problem Relation Age of Onset   • Uterine cancer Mother    • Hypertension Other    • Hyperlipidemia Other    • Arthritis Other    • Stroke Other    • Heart disease Sister    • Hypertension Brother        Social History:  Social History     Tobacco Use   • Smoking status: Former Smoker   Substance Use Topics   • Alcohol use: Yes     Comment: Occasionally   • Drug use: No        Objective     Physical Exam:      General Appearance:    Alert, cooperative, in no acute distress   Head:    Normocephalic, without obvious abnormality, atraumatic   Eyes:            Lids and lashes normal, conjunctivae and sclerae normal, no    icterus, no pallor, corneas clear, PERRLA   Ears:    Ears appear intact with no abnormalities noted   Throat:   No oral lesions, no thrush, oral mucosa moist   Neck:   No adenopathy, supple, trachea midline, no thyromegaly, no   carotid bruit, no JVD   Back:     No kyphosis present, no scoliosis present, no skin lesions,      erythema or scars, no tenderness to percussion or                   palpation,   range of motion normal   Lungs:     Clear to auscultation,respirations regular, even and                  unlabored    Heart:    Regular rhythm and normal rate, normal S1 and S2, no            murmur, no gallop, no rub, no click   Chest Wall:    No abnormalities observed   Abdomen:     Normal bowel sounds, no masses, no organomegaly, soft        non-tender, non-distended, no guarding, no rebound                tenderness   Rectal:     Deferred   Extremities:   Moves all extremities well, no edema, no cyanosis, no             redness   Pulses:   Pulses palpable and equal bilaterally   Skin:   No bleeding, bruising or rash   Lymph nodes:   No palpable adenopathy   Neurologic:   Cranial nerves 2 - 12 grossly intact, sensation intact, DTR       present and equal bilaterally       Vital Signs       Anticipated Surgical Procedure:  CT BONE MARROW BIOPSY    The risks, benefits and alternatives of this procedure have been discussed with the patient or responsible party: Yes        Kelvin Frazier MD  01/11/19  6:59 AM

## 2019-01-12 ENCOUNTER — TELEPHONE (OUTPATIENT)
Dept: INTERVENTIONAL RADIOLOGY/VASCULAR | Facility: HOSPITAL | Age: 79
End: 2019-01-12

## 2019-01-17 ENCOUNTER — TELEPHONE (OUTPATIENT)
Dept: ONCOLOGY | Facility: CLINIC | Age: 79
End: 2019-01-17

## 2019-01-17 NOTE — TELEPHONE ENCOUNTER
----- Message from Angeline Pérez sent at 1/17/2019 11:55 AM EST -----  Regarding: move patient per Dr Daigle next Thursday   Waiting on Bone Marrow results

## 2019-01-18 ENCOUNTER — TELEPHONE (OUTPATIENT)
Dept: ONCOLOGY | Facility: CLINIC | Age: 79
End: 2019-01-18

## 2019-01-18 ENCOUNTER — APPOINTMENT (OUTPATIENT)
Dept: ONCOLOGY | Facility: CLINIC | Age: 79
End: 2019-01-18

## 2019-01-18 ENCOUNTER — APPOINTMENT (OUTPATIENT)
Dept: LAB | Facility: HOSPITAL | Age: 79
End: 2019-01-18

## 2019-01-18 NOTE — TELEPHONE ENCOUNTER
----- Message from Josh Daigle MD sent at 1/18/2019 11:43 AM EST -----  Regarding: RE: move patient per Dr Daigle next Thursday   ok  ----- Message -----  From: Manisha Jimenez RegSched Rep  Sent: 1/18/2019  11:10 AM  To: Josh Daigle MD  Subject: FW: move patient per Dr Daigle next Thursday     Re-sending this to you as a follow-up. I had not heard back and double checking that this is ok?    Let me know when you get a sec please.    Thank you!!    Evie    ----- Message -----  From: Manisha Jimenez RegSched Rep  Sent: 1/17/2019   1:25 PM  To: Josh Daigle MD, #  Subject: FW: move patient per Dr Daigle next Thursday     Dr. Daigle,    I put this on for Thursday morning, 1-24 at 8:40am. You have another patient that morning first thing, then you are in lung clinic and have other patients later in the morning.    Checking to make sure this is okay with you? Let me know when you get a moment please.    Thank you!!    Evie    ----- Message -----  From: Angeline Pérez  Sent: 1/17/2019  11:55 AM  To: [facility]  Pool  Subject: move patient per Dr Daigle next Thursday         Waiting on Bone Marrow results

## 2019-01-24 ENCOUNTER — OFFICE VISIT (OUTPATIENT)
Dept: ONCOLOGY | Facility: CLINIC | Age: 79
End: 2019-01-24

## 2019-01-24 ENCOUNTER — APPOINTMENT (OUTPATIENT)
Dept: LAB | Facility: HOSPITAL | Age: 79
End: 2019-01-24

## 2019-01-24 VITALS
HEART RATE: 74 BPM | DIASTOLIC BLOOD PRESSURE: 74 MMHG | RESPIRATION RATE: 18 BRPM | WEIGHT: 193.4 LBS | HEIGHT: 71 IN | BODY MASS INDEX: 27.07 KG/M2 | SYSTOLIC BLOOD PRESSURE: 160 MMHG | TEMPERATURE: 97.9 F | OXYGEN SATURATION: 95 %

## 2019-01-24 DIAGNOSIS — N18.30 ANEMIA IN STAGE 3 CHRONIC KIDNEY DISEASE (HCC): ICD-10-CM

## 2019-01-24 DIAGNOSIS — D70.8 OTHER NEUTROPENIA (HCC): ICD-10-CM

## 2019-01-24 DIAGNOSIS — D47.2 SMOLDERING MYELOMA: Primary | ICD-10-CM

## 2019-01-24 DIAGNOSIS — D63.1 ANEMIA IN STAGE 3 CHRONIC KIDNEY DISEASE (HCC): Primary | ICD-10-CM

## 2019-01-24 DIAGNOSIS — N18.30 ANEMIA IN STAGE 3 CHRONIC KIDNEY DISEASE (HCC): Primary | ICD-10-CM

## 2019-01-24 DIAGNOSIS — D63.1 ANEMIA IN STAGE 3 CHRONIC KIDNEY DISEASE (HCC): ICD-10-CM

## 2019-01-24 LAB
BASOPHILS # BLD AUTO: 0.02 10*3/MM3 (ref 0–0.1)
BASOPHILS NFR BLD AUTO: 0.6 % (ref 0–1.1)
DEPRECATED RDW RBC AUTO: 43.9 FL (ref 37–49)
EOSINOPHIL # BLD AUTO: 0.12 10*3/MM3 (ref 0–0.36)
EOSINOPHIL NFR BLD AUTO: 3.5 % (ref 1–5)
ERYTHROCYTE [DISTWIDTH] IN BLOOD BY AUTOMATED COUNT: 12.6 % (ref 11.7–14.5)
HCT VFR BLD AUTO: 36.1 % (ref 40–49)
HGB BLD-MCNC: 12.9 G/DL (ref 13.5–16.5)
IMM GRANULOCYTES # BLD AUTO: 0.01 10*3/MM3 (ref 0–0.03)
IMM GRANULOCYTES NFR BLD AUTO: 0.3 % (ref 0–0.5)
LYMPHOCYTES # BLD AUTO: 1.58 10*3/MM3 (ref 1–3.5)
LYMPHOCYTES NFR BLD AUTO: 46.1 % (ref 20–49)
MCH RBC QN AUTO: 33.8 PG (ref 27–33)
MCHC RBC AUTO-ENTMCNC: 35.7 G/DL (ref 32–35)
MCV RBC AUTO: 94.5 FL (ref 83–97)
MONOCYTES # BLD AUTO: 0.41 10*3/MM3 (ref 0.25–0.8)
MONOCYTES NFR BLD AUTO: 12 % (ref 4–12)
NEUTROPHILS # BLD AUTO: 1.29 10*3/MM3 (ref 1.5–7)
NEUTROPHILS NFR BLD AUTO: 37.5 % (ref 39–75)
NRBC BLD AUTO-RTO: 0 /100 WBC (ref 0–0)
PLATELET # BLD AUTO: 152 10*3/MM3 (ref 150–375)
PMV BLD AUTO: 9.7 FL (ref 8.9–12.1)
RBC # BLD AUTO: 3.82 10*6/MM3 (ref 4.3–5.5)
WBC NRBC COR # BLD: 3.43 10*3/MM3 (ref 4–10)

## 2019-01-24 PROCEDURE — 36415 COLL VENOUS BLD VENIPUNCTURE: CPT | Performed by: INTERNAL MEDICINE

## 2019-01-24 PROCEDURE — 85025 COMPLETE CBC W/AUTO DIFF WBC: CPT | Performed by: INTERNAL MEDICINE

## 2019-01-24 PROCEDURE — 99214 OFFICE O/P EST MOD 30 MIN: CPT | Performed by: INTERNAL MEDICINE

## 2019-01-24 RX ORDER — IRBESARTAN AND HYDROCHLOROTHIAZIDE 300; 12.5 MG/1; MG/1
TABLET, FILM COATED ORAL
COMMUNITY
Start: 2018-12-04

## 2019-01-24 NOTE — PROGRESS NOTES
Subjective     REASON FOR FOLLOW-UP:  Chronic leukopenia, monoclonal gammopathy                               REQUESTING PHYSICIAN:  Eduardo    RECORDS OBTAINED:  Records of the patients history including those obtained from the referring provider were reviewed and summarized in detail.      History of Present Illness   This is a very pleasant 78-year-old man seen previously in our practice by Dr. Jenkins for leukopenia and anemia and IgG paraproteinemia (MGUS).  Labs from our previous [employer] [facility] showed a serum protein electrophoresis from 12/19/2011 showing an M spike 1.4 g/dL IgG kappa.  I could not find previous light chain studies within our records.    In return today, the patient is doing well with no infections, weight loss, night sweats, bone pain other than arthritic in nature.      Past Medical History:   Diagnosis Date   • Arthritis     hands   • CAD (coronary artery disease)    • Diabetes (CMS/HCC)    • GERD (gastroesophageal reflux disease)    • H/O MGUS (monoclonal gammopathy of unknown significance)    • History of colon polyps    • History of herpes zoster 2007   • History of pneumonia    • Hyperlipidemia    • Hypertension    • Neutropenia (CMS/HCC)     H/O hereditary neutropenia   • Prostate cancer (CMS/HCC) 2007   • Stroke (CMS/HCC) 1997        Past Surgical History:   Procedure Laterality Date   • ANAL FISSURECTOMY     • CATARACT EXTRACTION Bilateral 2009   • COLONOSCOPY  06/2014    cecal polyp, sigmoid diverticulosis   • ENDOSCOPY  2001   • OTHER SURGICAL HISTORY  2007    Decortication and right lower lobe with resection   • PROSTATE BIOPSY  2007   • THORACOSCOPY          Current Outpatient Medications on File Prior to Visit   Medication Sig Dispense Refill   • atorvastatin (LIPITOR) 40 MG tablet Daily.     • clopidogrel (PLAVIX) 75 MG tablet Take 75 mg by mouth.     • diclofenac (VOLTAREN) 1 % gel gel Voltaren 1 % topical gel     • esomeprazole (nexIUM) 40 MG capsule Take 40 mg by  mouth Every Morning Before Breakfast.     • HYDROcodone-acetaminophen (NORCO) 5-325 MG per tablet Take 1 tablet by mouth Every 6 (Six) Hours As Needed.     • irbesartan-hydrochlorothiazide (AVALIDE) 300-12.5 MG tablet      • Multiple Vitamins-Minerals (MULTIVITAMIN ADULTS 50+ PO) Take  by mouth Daily.     • pregabalin (LYRICA) 50 MG capsule Take 50 mg by mouth Daily.     • sildenafil (REVATIO) 20 MG tablet TK 2 TS PO QD PRN  3   • valsartan-hydrochlorothiazide (DIOVAN-HCT) 320-25 MG per tablet 1 tablet Daily.       No current facility-administered medications on file prior to visit.         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 The patient blows a trumpet and enjoys vacationing at [place]. 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        Family History   Problem Relation Age of Onset   • Uterine cancer Mother    • Hypertension Other    • Hyperlipidemia Other    • Arthritis Other    • Stroke Other    • Heart disease Sister    • Hypertension Brother         Review of Systems   Constitutional: Negative.    HENT: Negative.    Eyes: Negative.    Respiratory: Negative.    Cardiovascular: Negative.    Gastrointestinal: Negative.    Genitourinary: Negative.    Musculoskeletal: Positive for arthralgias.   Skin: Negative.    Allergic/Immunologic: Negative.    Neurological: Negative.    Hematological: Negative.    Psychiatric/Behavioral: Negative.     ROS unchanged-1/24/19      Objective     Vitals:    01/24/19  "0821   BP: 160/74   Pulse: 74   Resp: 18   Temp: 97.9 °F (36.6 °C)   TempSrc: Oral   SpO2: 95%   Weight: 87.7 kg (193 lb 6.4 oz)   Height: 180.3 cm (70.98\")   PainSc: 0-No pain     Current Status 2019   ECOG score 0       Physical Exam    Con: pleasant, well-appearing man  HEENT: no icterus, no thrush, moist membranes  CV: RRR, S1S2  RESP: CTA bilat, no wheezing  GI: soft, nontender, no splenomegaly, +BS  MS: no edema  SKIN: no petechiae or bruising  NEURO: alert and oriented x3, normal strength, normal muscle tone  PSYCH: normal mood    Physical exam performed and unchanged from above-19    RECENT LABS:  Hematology WBC   Date Value Ref Range Status   2019 3.43 (L) 4.00 - 10.00 10*3/mm3 Final     RBC   Date Value Ref Range Status   2019 3.82 (L) 4.30 - 5.50 10*6/mm3 Final     Hemoglobin   Date Value Ref Range Status   2019 12.9 (L) 13.5 - 16.5 g/dL Final     Hematocrit   Date Value Ref Range Status   2019 36.1 (L) 40.0 - 49.0 % Final     Platelets   Date Value Ref Range Status   2019 152 150 - 375 10*3/mm3 Final        Lab Results   Component Value Date    GLUCOSE 120 10/08/2018    BUN 18 10/08/2018    CREATININE 1.06 10/08/2018    EGFRIFAFRI 82 10/08/2018    BCR 17.0 10/08/2018    K 3.8 10/08/2018    CO2 26.7 10/08/2018    CALCIUM 8.9 10/08/2018    PROTENTOTREF 6.9 10/08/2018    ALBUMIN 4.10 10/08/2018    ALBUMIN 3.9 10/08/2018    LABIL2 1.3 10/08/2018    AST 36 10/08/2018    ALT 41 10/08/2018     Peripheral blood flow cytometry 3/12/18: No abnormal myeloid or lymphoid populations identified  B2M.8  FLC ratio 3.96-->4.55  ESR 12  M-spike 1.4 g/dL IgG kappa-->1.4 g/dL    Skeletal survey 3/12/18:  Negative.  I personally reviewed images and see no lytic lesions.      Assessment/Plan     1.  Chronic leukopenia: The patient's ANC has run borderline low for a number of years.  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      Bone marrow exam was performed 1/11/19 reviewed today with the patient.  The bone marrow was mildly hypercellular 40% with involvement of a plasma cell dyscrasia 8% by aspirate and 15% by core biopsy.  Plasma cells were monoclonal kappa by flow cytometry.  There was trilineage hematopoiesis.  [place] red stain was negative for amyloid deposition.  As of 1/24/19 cytogenetics pending.  No dyspoiesis of the white cells noted.    4.  MGUS:  M spike this visit stable 1.4 g/dL IgG.  He does have increased number of plasma cells by bone marrow biopsy 8% aspirate/15% core biopsy monoclonal kappa.  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  Continue observation.  I ordered a repeat CBC, CMP, protein like to pheresis, immunofixation, free light chain ratio to be done in 3 months    3.  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  Iron studies, B12, folic acid without obvious deficiency.  Hemoglobin stable 12.9.

## 2019-01-26 LAB
CYTO UR: NORMAL
LAB AP CASE REPORT: NORMAL
LAB AP CLINICAL INFORMATION: NORMAL
LAB AP SPECIAL STAINS: NORMAL
Lab: NORMAL
Lab: NORMAL
PATH REPORT.ADDENDUM SPEC: NORMAL
PATH REPORT.FINAL DX SPEC: NORMAL
PATH REPORT.GROSS SPEC: NORMAL

## 2019-02-17 ENCOUNTER — HOSPITAL ENCOUNTER (EMERGENCY)
Facility: HOSPITAL | Age: 79
Discharge: HOME | End: 2019-02-17
Attending: EMERGENCY MEDICINE | Admitting: EMERGENCY MEDICINE

## 2019-02-17 VITALS
WEIGHT: 200 LBS | BODY MASS INDEX: 28 KG/M2 | TEMPERATURE: 96.4 F | HEIGHT: 71 IN | DIASTOLIC BLOOD PRESSURE: 98 MMHG | RESPIRATION RATE: 18 BRPM | HEART RATE: 82 BPM | OXYGEN SATURATION: 97 % | SYSTOLIC BLOOD PRESSURE: 168 MMHG

## 2019-02-17 DIAGNOSIS — M62.830 SPASM OF THORACIC BACK MUSCLE: Primary | ICD-10-CM

## 2019-02-17 PROCEDURE — 99283 EMERGENCY DEPT VISIT LOW MDM: CPT

## 2019-02-17 RX ORDER — CYCLOBENZAPRINE HCL 10 MG
10 TABLET ORAL 3 TIMES DAILY PRN
Qty: 30 TABLET | Refills: 0 | Status: SHIPPED | OUTPATIENT
Start: 2019-02-17

## 2019-02-17 NOTE — ED PROVIDER NOTES
EMERGENCY DEPARTMENT ENCOUNTER    CHIEF COMPLAINT  Chief Complaint: back pain  History given by: patient  History limited by: none  Room Number: 17/17  PMD: Mg Clark MD      HPI:  Pt is a 78 y.o. male who presents complaining of aching b/w his shoulder blades (R>L) for the past two weeks. He advised that he had shingles 2-3 years ago and still continues to have some pain in his back intermittently, but the pain is not similar. Pain is improved w/ activity and worsens at rest. He has taken tylenol w/o relief of pain. Denies trauma, repetitive movements, and strenuous activity.    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   • Other neutropenia (CMS/[facility]) 03/12/2018   • Anemia in stage 3 chronic kidney disease (CMS/[facility]) 04/16/2018     Resolved Ambulatory Problems     Diagnosis Date Noted   • No Resolved Ambulatory Problems     Past Medical History:   Diagnosis Date   • Arthritis    • CAD (coronary artery disease)    • Diabetes (CMS/[facility])    • GERD (gastroesophageal reflux disease)    • H/O MGUS (monoclonal gammopathy of unknown significance)    • History of colon polyps    • History of herpes zoster 2007   • History of pneumonia    • Hyperlipidemia    • Hypertension    • Neutropenia (CMS/[facility])    • Prostate cancer (CMS/HCC) 2007   • Stroke (CMS/[facility]) 1997       PAST SURGICAL HISTORY  Past Surgical History:   Procedure Laterality Date   • ANAL FISSURECTOMY     • CATARACT EXTRACTION Bilateral 2009   • COLONOSCOPY  06/2014    cecal polyp, sigmoid diverticulosis   • ENDOSCOPY  2001   • OTHER SURGICAL HISTORY  2007    Decortication and right lower lobe with resection   • PROSTATE  BIOPSY  2007   • THORACOSCOPY         FAMILY HISTORY  Family History   Problem Relation Age of Onset   • Uterine cancer Mother    • Hypertension Other    • Hyperlipidemia Other    • Arthritis Other    • Stroke Other    • Heart disease Sister    • Hypertension Brother        SOCIAL HISTORY  Social History     Socioeconomic History   • Marital status:      Spouse name: Candace   • Number of children: Not on file   • Years of education: High school   • Highest education level: Not on file   Social Needs   • Financial resource strain: Not on file   • Food insecurity - worry: Not on file   • Food insecurity - inability: Not on file   • Transportation needs - medical: Not on file   • Transportation needs - non-medical: Not on file   Occupational History   • Occupation: Paint contractor     Employer: RETIRED   Tobacco Use   • Smoking status: Former Smoker   Substance and Sexual Activity   • Alcohol use: Yes     Comment: Occasionally   • Drug use: No   • Sexual activity: Not on file   Other Topics Concern   • Not on file   Social History Narrative    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 The patient blows a trumpet and enjoys vacationing at [place]. 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       ALLERGIES  Patient has no known allergies.    REVIEW OF SYSTEMS  Review of Systems   Constitutional: Negative for activity change, appetite change and fever.   HENT: Negative for congestion and sore throat.    Eyes: Negative.    Respiratory: Negative for cough and shortness of breath.    Cardiovascular: Negative for chest pain and leg swelling.   Gastrointestinal: Negative for abdominal pain, diarrhea and vomiting.   Endocrine: Negative.    Genitourinary: Negative for decreased urine volume and dysuria.   Musculoskeletal: Negative for neck pain.        Back/shoulder pain   Skin: Negative for rash and wound.   Allergic/Immunologic: Negative.    Neurological: Negative for weakness, numbness and  headaches.   Hematological: Negative.    Psychiatric/Behavioral: Negative.    All other systems reviewed and are negative.      PHYSICAL EXAM  ED Triage Vitals   Temp Heart Rate Resp BP SpO2   02/17/19 1646 02/17/19 1646 02/17/19 1646 02/17/19 1702 02/17/19 1646   96.4 °F (35.8 °C) 82 18 168/98 97 %      Temp src Heart Rate Source Patient Position BP Location FiO2 (%)   -- -- 02/17/19 1702 02/17/19 1702 --     Lying Right arm        Physical Exam   Constitutional: He is oriented to person, place, and time. No distress.   HENT:   Head: Normocephalic and atraumatic.   Eyes: EOM are normal. Pupils are equal, round, and reactive to light.   Neck: Normal range of motion. Neck supple.   Cardiovascular: Normal rate, regular rhythm and normal heart sounds.   Pulmonary/Chest: Effort normal and breath sounds normal. No respiratory distress.   Abdominal: Soft. There is no tenderness. There is no rebound and no guarding.   Musculoskeletal: Normal range of motion. He exhibits no edema.   Significant muscle spasm along the R scapula.   Neurological: He is alert and oriented to person, place, and time. He has normal sensation and normal strength.   Skin: Skin is warm and dry.   Psychiatric: Mood and affect normal.   Nursing note and vitals reviewed.      PROCEDURES  Procedures      PROGRESS AND CONSULTS     1735  Discussed PEx findings and the plan to d/c w/ rx for support of sx. Pt given return to ED instructions. Pt understands and agrees with the plan, all questions answered.    MEDICAL DECISION MAKING  Results were reviewed/discussed with the patient and they were also made aware of online access. Pt also made aware that some labs, such as cultures, will not be resulted during ER visit and follow up with PMD is necessary.     MDM       DIAGNOSIS  Final diagnoses:   Spasm of thoracic back muscle       DISPOSITION  DISCHARGE    Patient discharged in stable condition.    Reviewed implications of results, diagnosis, meds,  responsibility to follow up, warning signs and symptoms of possible worsening, potential complications and reasons to return to ER.    Patient/Family voiced understanding of above instructions.    Discussed plan for discharge, as there is no emergent indication for admission. Patient referred to primary care provider for BP management due to today's BP. Pt/family is agreeable and understands need for follow up and repeat testing.  Pt is aware that discharge does not mean that nothing is wrong but it indicates no emergency is present that requires admission and they must continue care with follow-up as given below or physician of their choice.     FOLLOW-UP  Mg Clark MD  [address] [address]  [phone]    Call   Call your provider and schedule follow-up         Medication List      New Prescriptions    cyclobenzaprine 10 MG tablet  Commonly known as:  FLEXERIL  Take 1 tablet by mouth 3 (Three) Times a Day As Needed for Muscle Spasms.            Latest Documented Vital Signs:  As of 5:39 PM  BP- 168/98 HR- 82 Temp- 96.4 °F (35.8 °C) O2 sat- 97%    --  Documentation assistance provided by huong Newby for Dr. Britton.  Information recorded by the scribe was done at my direction and has been verified and validated by me.     Lexy Newby  02/17/19 2615       Jeffery Britton MD  02/17/19 7414

## 2019-03-01 ENCOUNTER — HOSPITAL ENCOUNTER (OUTPATIENT)
Dept: GENERAL RADIOLOGY | Facility: HOSPITAL | Age: 79
Discharge: HOME | End: 2019-03-01
Admitting: INTERNAL MEDICINE

## 2019-03-01 DIAGNOSIS — M47.812 SPONDYLOSIS OF CERVICAL REGION WITHOUT MYELOPATHY OR RADICULOPATHY: ICD-10-CM

## 2019-03-01 PROCEDURE — 72040 X-RAY EXAM NECK SPINE 2-3 VW: CPT

## 2019-04-15 ENCOUNTER — TELEPHONE (OUTPATIENT)
Dept: ONCOLOGY | Facility: CLINIC | Age: 79
End: 2019-04-15

## 2019-04-15 NOTE — TELEPHONE ENCOUNTER
----- Message from Angeline Pérez sent at 4/15/2019  2:15 PM EDT -----  Regarding: missed labs    Needs done week prior to appt

## 2019-04-16 ENCOUNTER — LAB (OUTPATIENT)
Dept: LAB | Facility: HOSPITAL | Age: 79
End: 2019-04-16

## 2019-04-16 DIAGNOSIS — D47.2 SMOLDERING MYELOMA: ICD-10-CM

## 2019-04-16 LAB
ALBUMIN SERPL-MCNC: 4.1 G/DL (ref 3.5–5.2)
ALBUMIN/GLOB SERPL: 1.4 G/DL (ref 1.1–2.4)
ALP SERPL-CCNC: 48 U/L (ref 38–116)
ALT SERPL W P-5'-P-CCNC: 42 U/L (ref 0–41)
ANION GAP SERPL CALCULATED.3IONS-SCNC: 14.1 MMOL/L
AST SERPL-CCNC: 48 U/L (ref 0–40)
BASOPHILS # BLD AUTO: 0 10*3/MM3 (ref 0–0.2)
BASOPHILS NFR BLD AUTO: 0 % (ref 0–1.5)
BILIRUB SERPL-MCNC: 0.8 MG/DL (ref 0.2–1.2)
BUN BLD-MCNC: 18 MG/DL (ref 6–20)
BUN/CREAT SERPL: 15.5 (ref 7.3–30)
CALCIUM SPEC-SCNC: 8.7 MG/DL (ref 8.5–10.2)
CHLORIDE SERPL-SCNC: 103 MMOL/L (ref 98–107)
CO2 SERPL-SCNC: 23.9 MMOL/L (ref 22–29)
CREAT BLD-MCNC: 1.16 MG/DL (ref 0.7–1.3)
DEPRECATED RDW RBC AUTO: 45 FL (ref 37–54)
EOSINOPHIL # BLD AUTO: 0.05 10*3/MM3 (ref 0–0.4)
EOSINOPHIL NFR BLD AUTO: 1.3 % (ref 0.3–6.2)
ERYTHROCYTE [DISTWIDTH] IN BLOOD BY AUTOMATED COUNT: 12.7 % (ref 12.3–15.4)
GFR SERPL CREATININE-BSD FRML MDRD: 74 ML/MIN/1.73
GLOBULIN UR ELPH-MCNC: 3 GM/DL (ref 1.8–3.5)
GLUCOSE BLD-MCNC: 164 MG/DL (ref 74–124)
HCT VFR BLD AUTO: 34.3 % (ref 37.5–51)
HGB BLD-MCNC: 11.7 G/DL (ref 13–17.7)
IMM GRANULOCYTES # BLD AUTO: 0.01 10*3/MM3 (ref 0–0.05)
IMM GRANULOCYTES NFR BLD AUTO: 0.3 % (ref 0–0.5)
LYMPHOCYTES # BLD AUTO: 1.23 10*3/MM3 (ref 0.7–3.1)
LYMPHOCYTES NFR BLD AUTO: 30.9 % (ref 19.6–45.3)
MCH RBC QN AUTO: 33.1 PG (ref 26.6–33)
MCHC RBC AUTO-ENTMCNC: 34.1 G/DL (ref 31.5–35.7)
MCV RBC AUTO: 97.2 FL (ref 79–97)
MONOCYTES # BLD AUTO: 0.32 10*3/MM3 (ref 0.1–0.9)
MONOCYTES NFR BLD AUTO: 8 % (ref 5–12)
NEUTROPHILS # BLD AUTO: 2.37 10*3/MM3 (ref 1.4–7)
NEUTROPHILS NFR BLD AUTO: 59.5 % (ref 42.7–76)
NRBC BLD AUTO-RTO: 0 /100 WBC (ref 0–0)
PLATELET # BLD AUTO: 177 10*3/MM3 (ref 140–450)
PMV BLD AUTO: 9.6 FL (ref 6–12)
POTASSIUM BLD-SCNC: 3.7 MMOL/L (ref 3.5–4.7)
PROT SERPL-MCNC: 7.1 G/DL (ref 6.3–8)
RBC # BLD AUTO: 3.53 10*6/MM3 (ref 4.14–5.8)
SODIUM BLD-SCNC: 141 MMOL/L (ref 134–145)
WBC NRBC COR # BLD: 3.98 10*3/MM3 (ref 3.4–10.8)

## 2019-04-16 PROCEDURE — 80053 COMPREHEN METABOLIC PANEL: CPT | Performed by: INTERNAL MEDICINE

## 2019-04-16 PROCEDURE — 36415 COLL VENOUS BLD VENIPUNCTURE: CPT | Performed by: INTERNAL MEDICINE

## 2019-04-16 PROCEDURE — 85025 COMPLETE CBC W/AUTO DIFF WBC: CPT | Performed by: INTERNAL MEDICINE

## 2019-04-17 LAB
ALBUMIN SERPL-MCNC: 3.8 G/DL (ref 2.9–4.4)
ALBUMIN/GLOB SERPL: 1.2 {RATIO} (ref 0.7–1.7)
ALPHA1 GLOB FLD ELPH-MCNC: 0.2 G/DL (ref 0–0.4)
ALPHA2 GLOB SERPL ELPH-MCNC: 0.5 G/DL (ref 0.4–1)
B-GLOBULIN SERPL ELPH-MCNC: 0.9 G/DL (ref 0.7–1.3)
GAMMA GLOB SERPL ELPH-MCNC: 1.6 G/DL (ref 0.4–1.8)
GLOBULIN SER CALC-MCNC: 3.1 G/DL (ref 2.2–3.9)
IGA SERPL-MCNC: 21 MG/DL (ref 61–437)
IGG SERPL-MCNC: 1472 MG/DL (ref 700–1600)
IGM SERPL-MCNC: 35 MG/DL (ref 15–143)
KAPPA LC SERPL-MCNC: 35 MG/L (ref 3.3–19.4)
KAPPA LC/LAMBDA SER: 5.83 {RATIO} (ref 0.26–1.65)
LAMBDA LC FREE SERPL-MCNC: 6 MG/L (ref 5.7–26.3)
Lab: ABNORMAL
M-SPIKE: 1.4 G/DL
PROT PATTERN SERPL IFE-IMP: ABNORMAL
PROT SERPL-MCNC: 6.9 G/DL (ref 6–8.5)

## 2019-04-18 ENCOUNTER — APPOINTMENT (OUTPATIENT)
Dept: LAB | Facility: HOSPITAL | Age: 79
End: 2019-04-18

## 2019-04-18 ENCOUNTER — OFFICE VISIT (OUTPATIENT)
Dept: ONCOLOGY | Facility: CLINIC | Age: 79
End: 2019-04-18

## 2019-04-18 VITALS
RESPIRATION RATE: 14 BRPM | DIASTOLIC BLOOD PRESSURE: 81 MMHG | HEART RATE: 68 BPM | TEMPERATURE: 98 F | HEIGHT: 71 IN | OXYGEN SATURATION: 98 % | SYSTOLIC BLOOD PRESSURE: 152 MMHG | WEIGHT: 192 LBS | BODY MASS INDEX: 26.88 KG/M2

## 2019-04-18 DIAGNOSIS — D63.1 ANEMIA IN STAGE 3 CHRONIC KIDNEY DISEASE (HCC): ICD-10-CM

## 2019-04-18 DIAGNOSIS — D70.8 OTHER NEUTROPENIA (HCC): ICD-10-CM

## 2019-04-18 DIAGNOSIS — N18.30 ANEMIA IN STAGE 3 CHRONIC KIDNEY DISEASE (HCC): ICD-10-CM

## 2019-04-18 DIAGNOSIS — D47.2 SMOLDERING MYELOMA: Primary | ICD-10-CM

## 2019-04-18 PROCEDURE — 99213 OFFICE O/P EST LOW 20 MIN: CPT | Performed by: INTERNAL MEDICINE

## 2019-08-19 ENCOUNTER — APPOINTMENT (OUTPATIENT)
Dept: ONCOLOGY | Facility: CLINIC | Age: 79
End: 2019-08-19

## 2019-08-19 ENCOUNTER — APPOINTMENT (OUTPATIENT)
Dept: LAB | Facility: HOSPITAL | Age: 79
End: 2019-08-19

## 2019-09-20 ENCOUNTER — LAB (OUTPATIENT)
Dept: LAB | Facility: HOSPITAL | Age: 79
End: 2019-09-20

## 2019-09-20 DIAGNOSIS — D47.2 SMOLDERING MYELOMA: ICD-10-CM

## 2019-09-20 LAB
ALBUMIN SERPL-MCNC: 4.3 G/DL (ref 3.5–5.2)
ALBUMIN/GLOB SERPL: 1.4 G/DL (ref 1.1–2.4)
ALP SERPL-CCNC: 49 U/L (ref 38–116)
ALT SERPL W P-5'-P-CCNC: 32 U/L (ref 0–41)
ANION GAP SERPL CALCULATED.3IONS-SCNC: 10.5 MMOL/L (ref 5–15)
AST SERPL-CCNC: 31 U/L (ref 0–40)
BASOPHILS # BLD AUTO: 0.01 10*3/MM3 (ref 0–0.2)
BASOPHILS NFR BLD AUTO: 0.3 % (ref 0–1.5)
BILIRUB SERPL-MCNC: 0.8 MG/DL (ref 0.2–1.2)
BUN BLD-MCNC: 14 MG/DL (ref 6–20)
BUN/CREAT SERPL: 12.5 (ref 7.3–30)
CALCIUM SPEC-SCNC: 8.9 MG/DL (ref 8.5–10.2)
CHLORIDE SERPL-SCNC: 105 MMOL/L (ref 98–107)
CO2 SERPL-SCNC: 25.5 MMOL/L (ref 22–29)
CREAT BLD-MCNC: 1.12 MG/DL (ref 0.7–1.3)
DEPRECATED RDW RBC AUTO: 45.1 FL (ref 37–54)
EOSINOPHIL # BLD AUTO: 0.03 10*3/MM3 (ref 0–0.4)
EOSINOPHIL NFR BLD AUTO: 0.8 % (ref 0.3–6.2)
ERYTHROCYTE [DISTWIDTH] IN BLOOD BY AUTOMATED COUNT: 12.5 % (ref 12.3–15.4)
GFR SERPL CREATININE-BSD FRML MDRD: 77 ML/MIN/1.73
GLOBULIN UR ELPH-MCNC: 3 GM/DL (ref 1.8–3.5)
GLUCOSE BLD-MCNC: 118 MG/DL (ref 74–124)
HCT VFR BLD AUTO: 35.2 % (ref 37.5–51)
HGB BLD-MCNC: 11.9 G/DL (ref 13–17.7)
IMM GRANULOCYTES # BLD AUTO: 0.01 10*3/MM3 (ref 0–0.05)
IMM GRANULOCYTES NFR BLD AUTO: 0.3 % (ref 0–0.5)
LYMPHOCYTES # BLD AUTO: 1.3 10*3/MM3 (ref 0.7–3.1)
LYMPHOCYTES NFR BLD AUTO: 35.5 % (ref 19.6–45.3)
MCH RBC QN AUTO: 33.8 PG (ref 26.6–33)
MCHC RBC AUTO-ENTMCNC: 33.8 G/DL (ref 31.5–35.7)
MCV RBC AUTO: 100 FL (ref 79–97)
MONOCYTES # BLD AUTO: 0.24 10*3/MM3 (ref 0.1–0.9)
MONOCYTES NFR BLD AUTO: 6.6 % (ref 5–12)
NEUTROPHILS # BLD AUTO: 2.07 10*3/MM3 (ref 1.7–7)
NEUTROPHILS NFR BLD AUTO: 56.5 % (ref 42.7–76)
NRBC BLD AUTO-RTO: 0 /100 WBC (ref 0–0.2)
PLATELET # BLD AUTO: 167 10*3/MM3 (ref 140–450)
PMV BLD AUTO: 9.8 FL (ref 6–12)
POTASSIUM BLD-SCNC: 3.4 MMOL/L (ref 3.5–4.7)
PROT SERPL-MCNC: 7.3 G/DL (ref 6.3–8)
RBC # BLD AUTO: 3.52 10*6/MM3 (ref 4.14–5.8)
SODIUM BLD-SCNC: 141 MMOL/L (ref 134–145)
WBC NRBC COR # BLD: 3.66 10*3/MM3 (ref 3.4–10.8)

## 2019-09-20 PROCEDURE — 36415 COLL VENOUS BLD VENIPUNCTURE: CPT | Performed by: INTERNAL MEDICINE

## 2019-09-20 PROCEDURE — 80053 COMPREHEN METABOLIC PANEL: CPT | Performed by: INTERNAL MEDICINE

## 2019-09-20 PROCEDURE — 85025 COMPLETE CBC W/AUTO DIFF WBC: CPT | Performed by: INTERNAL MEDICINE

## 2019-09-21 LAB
KAPPA LC SERPL-MCNC: 37.2 MG/L (ref 3.3–19.4)
KAPPA LC/LAMBDA SER: 5.64 {RATIO} (ref 0.26–1.65)
LAMBDA LC FREE SERPL-MCNC: 6.6 MG/L (ref 5.7–26.3)

## 2019-09-23 LAB
ALBUMIN SERPL-MCNC: 4 G/DL (ref 2.9–4.4)
ALBUMIN/GLOB SERPL: 1.4 {RATIO} (ref 0.7–1.7)
ALPHA1 GLOB FLD ELPH-MCNC: 0.2 G/DL (ref 0–0.4)
ALPHA2 GLOB SERPL ELPH-MCNC: 0.4 G/DL (ref 0.4–1)
B-GLOBULIN SERPL ELPH-MCNC: 0.8 G/DL (ref 0.7–1.3)
GAMMA GLOB SERPL ELPH-MCNC: 1.5 G/DL (ref 0.4–1.8)
GLOBULIN SER CALC-MCNC: 2.9 G/DL (ref 2.2–3.9)
IGA SERPL-MCNC: 23 MG/DL (ref 61–437)
IGG SERPL-MCNC: 1705 MG/DL (ref 700–1600)
IGM SERPL-MCNC: 34 MG/DL (ref 15–143)
Lab: ABNORMAL
M-SPIKE: 1.2 G/DL
PROT PATTERN SERPL IFE-IMP: ABNORMAL
PROT SERPL-MCNC: 6.9 G/DL (ref 6–8.5)

## 2019-09-24 ENCOUNTER — APPOINTMENT (OUTPATIENT)
Dept: LAB | Facility: HOSPITAL | Age: 79
End: 2019-09-24

## 2019-09-30 ENCOUNTER — APPOINTMENT (OUTPATIENT)
Dept: LAB | Facility: HOSPITAL | Age: 79
End: 2019-09-30

## 2019-09-30 ENCOUNTER — OFFICE VISIT (OUTPATIENT)
Dept: ONCOLOGY | Facility: CLINIC | Age: 79
End: 2019-09-30

## 2019-09-30 VITALS
WEIGHT: 192 LBS | RESPIRATION RATE: 12 BRPM | DIASTOLIC BLOOD PRESSURE: 82 MMHG | HEIGHT: 71 IN | SYSTOLIC BLOOD PRESSURE: 152 MMHG | OXYGEN SATURATION: 98 % | HEART RATE: 70 BPM | TEMPERATURE: 98.7 F | BODY MASS INDEX: 26.88 KG/M2

## 2019-09-30 DIAGNOSIS — D63.1 ANEMIA IN STAGE 3 CHRONIC KIDNEY DISEASE (HCC): ICD-10-CM

## 2019-09-30 DIAGNOSIS — D47.2 SMOLDERING MYELOMA: Primary | ICD-10-CM

## 2019-09-30 DIAGNOSIS — N18.30 ANEMIA IN STAGE 3 CHRONIC KIDNEY DISEASE (HCC): ICD-10-CM

## 2019-09-30 DIAGNOSIS — D70.8 OTHER NEUTROPENIA (HCC): ICD-10-CM

## 2019-09-30 PROCEDURE — 99213 OFFICE O/P EST LOW 20 MIN: CPT | Performed by: INTERNAL MEDICINE

## 2019-09-30 PROCEDURE — G0463 HOSPITAL OUTPT CLINIC VISIT: HCPCS | Performed by: INTERNAL MEDICINE

## 2019-09-30 NOTE — PROGRESS NOTES
Subjective     REASON FOR FOLLOW-UP:  Chronic leukopenia, smoldering myeloma, anemia                               REQUESTING PHYSICIAN:  Eduardo    RECORDS OBTAINED:  Records of the patients history including those obtained from the referring provider were reviewed and summarized in detail.      History of Present Illness   This is a very pleasant 79-year-old man returning today for follow-up of smoldering myeloma.  He returns today for follow-up doing well with no uncontrolled pain, shortness of breath, infections, bleeding or other concerns.      Past Medical History:   Diagnosis Date   • Arthritis     hands   • CAD (coronary artery disease)    • Diabetes (CMS/HCC)    • GERD (gastroesophageal reflux disease)    • H/O MGUS (monoclonal gammopathy of unknown significance)    • History of colon polyps    • History of herpes zoster 2007   • History of pneumonia    • Hyperlipidemia    • Hypertension    • Neutropenia (CMS/HCC)     H/O hereditary neutropenia   • Prostate cancer (CMS/HCC) 2007   • Stroke (CMS/HCC) 1997        Past Surgical History:   Procedure Laterality Date   • ANAL FISSURECTOMY     • CATARACT EXTRACTION Bilateral 2009   • COLONOSCOPY  06/2014    cecal polyp, sigmoid diverticulosis   • ENDOSCOPY  2001   • OTHER SURGICAL HISTORY  2007    Decortication and right lower lobe with resection   • PROSTATE BIOPSY  2007   • THORACOSCOPY          Current Outpatient Medications on File Prior to Visit   Medication Sig Dispense Refill   • atorvastatin (LIPITOR) 40 MG tablet Daily.     • clopidogrel (PLAVIX) 75 MG tablet Take 75 mg by mouth.     • cyclobenzaprine (FLEXERIL) 10 MG tablet Take 1 tablet by mouth 3 (Three) Times a Day As Needed for Muscle Spasms. 30 tablet 0   • diclofenac (VOLTAREN) 1 % gel gel Voltaren 1 % topical gel     • esomeprazole (nexIUM) 40 MG capsule Take 40 mg by mouth Every Morning Before Breakfast.     • HYDROcodone-acetaminophen (NORCO) 5-325 MG per tablet Take 1 tablet by mouth Every 6  (Six) Hours As Needed.     • irbesartan-hydrochlorothiazide (AVALIDE) 300-12.5 MG tablet      • Multiple Vitamins-Minerals (MULTIVITAMIN ADULTS 50+ PO) Take  by mouth Daily.     • pregabalin (LYRICA) 50 MG capsule Take 50 mg by mouth Daily.     • sildenafil (REVATIO) 20 MG tablet TK 2 TS PO QD PRN  3   • valsartan-hydrochlorothiazide (DIOVAN-HCT) 320-25 MG per tablet 1 tablet Daily.       No current facility-administered medications on file prior to visit.         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 The patient blows a trumpet and enjoys vacationing at [place]. 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        Family History   Problem Relation Age of Onset   • Uterine cancer Mother    • Hypertension Other    • Hyperlipidemia Other    • Arthritis Other    • Stroke Other    • Heart disease Sister    • Hypertension Brother         Review of Systems   Constitutional: Negative.    HENT: Negative.    Eyes: Negative.    Respiratory: Negative.    Cardiovascular: Negative.    Gastrointestinal: Negative.    Genitourinary: Negative.    Musculoskeletal: Positive for arthralgias.   Skin: Negative.    Allergic/Immunologic: Negative.    Neurological: Negative.    Hematological: Negative.    Psychiatric/Behavioral: Negative.     ROS unchanged- 9/30/2019      Objective     Vitals:    09/30/19 1438   BP: 152/82   Pulse: 70   Resp: 12   Temp: 98.7 °F (37.1 °C)   TempSrc: Oral   SpO2: 98%   Weight: 87.1 kg (192 lb)  "  Height: 180.3 cm (70.98\")   PainSc: 0-No pain     Current Status 2019   ECOG score 0       Physical Exam    Con: pleasant, well-appearing man  HEENT: no icterus, no thrush, moist membranes  CV: RRR, S1S2  RESP: CTA bilat, no wheezing  GI: soft, nontender, no splenomegaly, +BS  MS: no edema  SKIN: no petechiae or bruising  NEURO: alert and oriented x3, normal strength, normal muscle tone  PSYCH: normal mood    Physical exam performed and unchanged from above-2019    RECENT LABS:  Hematology WBC   Date Value Ref Range Status   2019 3.66 3.40 - 10.80 10*3/mm3 Final     RBC   Date Value Ref Range Status   2019 3.52 (L) 4.14 - 5.80 10*6/mm3 Final     Hemoglobin   Date Value Ref Range Status   2019 11.9 (L) 13.0 - 17.7 g/dL Final     Hematocrit   Date Value Ref Range Status   2019 35.2 (L) 37.5 - 51.0 % Final     Platelets   Date Value Ref Range Status   2019 167 140 - 450 10*3/mm3 Final        Lab Results   Component Value Date    GLUCOSE 118 2019    BUN 14 2019    CREATININE 1.12 2019    EGFRIFAFRI 77 2019    BCR 12.5 2019    K 3.4 (L) 2019    CO2 25.5 2019    CALCIUM 8.9 2019    PROTENTOTREF 6.9 2019    ALBUMIN 4.30 2019    ALBUMIN 4.0 2019    LABIL2 1.4 2019    AST 31 2019    ALT 32 2019     Peripheral blood flow cytometry 3/12/18: No abnormal myeloid or lymphoid populations identified  B2M.8  FLC ratio 3.96-->4.55->5.64  ESR 12  M-spike 1.4 g/dL IgG kappa-->1.4 g/dL-->1.2    Skeletal survey 3/12/18:  Negative.        Assessment/Plan     1.  Chronic leukopenia: The patient's ANC has run borderline low for a number of years.  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      Bone marrow exam was performed 19.  The bone marrow was mildly hypercellular 40% with involvement of a plasma cell " dyscrasia 8% by aspirate and 15% by core biopsy.  Plasma cells were monoclonal kappa by flow cytometry.  There was trilineage hematopoiesis.  Congo red stain was negative for amyloid deposition.  Cytogenetics were normal; complete FISH panel could not be performed.  No dyspoiesis of the white cells noted.    4.  Smoldering myeloma:  M spike this visit stable 1.4 g/dL IgG.  He does have increased number of plasma cells by bone marrow biopsy 8% aspirate/15% core biopsy monoclonal kappa.  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  Labs reviewed today continued to show a stable M spike 1.4 g/dL with no worsening renal function, hypercalcemia, mild anemia.  Continue observation with repeat labs 4 months     3.  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  Iron studies, B12, folic acid without obvious deficiency.  Hemoglobin currently stable at 11.9.    Follow-up in 4 months with CBC, CMP, protein electrophoresis/immunofixation and light chain ratio ordered

## 2020-01-17 ENCOUNTER — LAB (OUTPATIENT)
Dept: LAB | Facility: HOSPITAL | Age: 80
End: 2020-01-17

## 2020-01-17 DIAGNOSIS — D47.2 SMOLDERING MYELOMA: ICD-10-CM

## 2020-01-17 LAB
ALBUMIN SERPL-MCNC: 4 G/DL (ref 3.5–5.2)
ALBUMIN/GLOB SERPL: 1.2 G/DL (ref 1.1–2.4)
ALP SERPL-CCNC: 50 U/L (ref 38–116)
ALT SERPL W P-5'-P-CCNC: 36 U/L (ref 0–41)
ANION GAP SERPL CALCULATED.3IONS-SCNC: 12 MMOL/L (ref 5–15)
AST SERPL-CCNC: 36 U/L (ref 0–40)
BASOPHILS # BLD AUTO: 0.02 10*3/MM3 (ref 0–0.2)
BASOPHILS NFR BLD AUTO: 0.7 % (ref 0–1.5)
BILIRUB SERPL-MCNC: 0.7 MG/DL (ref 0.2–1.2)
BUN BLD-MCNC: 11 MG/DL (ref 6–20)
BUN/CREAT SERPL: 10.2 (ref 7.3–30)
CALCIUM SPEC-SCNC: 9 MG/DL (ref 8.5–10.2)
CHLORIDE SERPL-SCNC: 103 MMOL/L (ref 98–107)
CO2 SERPL-SCNC: 24 MMOL/L (ref 22–29)
CREAT BLD-MCNC: 1.08 MG/DL (ref 0.7–1.3)
DEPRECATED RDW RBC AUTO: 42.9 FL (ref 37–54)
EOSINOPHIL # BLD AUTO: 0.07 10*3/MM3 (ref 0–0.4)
EOSINOPHIL NFR BLD AUTO: 2.4 % (ref 0.3–6.2)
ERYTHROCYTE [DISTWIDTH] IN BLOOD BY AUTOMATED COUNT: 11.9 % (ref 12.3–15.4)
GFR SERPL CREATININE-BSD FRML MDRD: 80 ML/MIN/1.73
GLOBULIN UR ELPH-MCNC: 3.3 GM/DL (ref 1.8–3.5)
GLUCOSE BLD-MCNC: 135 MG/DL (ref 74–124)
HCT VFR BLD AUTO: 37.3 % (ref 37.5–51)
HGB BLD-MCNC: 13 G/DL (ref 13–17.7)
IMM GRANULOCYTES # BLD AUTO: 0 10*3/MM3 (ref 0–0.05)
IMM GRANULOCYTES NFR BLD AUTO: 0 % (ref 0–0.5)
LYMPHOCYTES # BLD AUTO: 1.49 10*3/MM3 (ref 0.7–3.1)
LYMPHOCYTES NFR BLD AUTO: 50.2 % (ref 19.6–45.3)
MCH RBC QN AUTO: 34.1 PG (ref 26.6–33)
MCHC RBC AUTO-ENTMCNC: 34.9 G/DL (ref 31.5–35.7)
MCV RBC AUTO: 97.9 FL (ref 79–97)
MONOCYTES # BLD AUTO: 0.36 10*3/MM3 (ref 0.1–0.9)
MONOCYTES NFR BLD AUTO: 12.1 % (ref 5–12)
NEUTROPHILS # BLD AUTO: 1.03 10*3/MM3 (ref 1.7–7)
NEUTROPHILS NFR BLD AUTO: 34.6 % (ref 42.7–76)
NRBC BLD AUTO-RTO: 0 /100 WBC (ref 0–0.2)
PLATELET # BLD AUTO: 189 10*3/MM3 (ref 140–450)
PMV BLD AUTO: 9.1 FL (ref 6–12)
POTASSIUM BLD-SCNC: 4 MMOL/L (ref 3.5–4.7)
PROT SERPL-MCNC: 7.3 G/DL (ref 6.3–8)
RBC # BLD AUTO: 3.81 10*6/MM3 (ref 4.14–5.8)
SODIUM BLD-SCNC: 139 MMOL/L (ref 134–145)
WBC NRBC COR # BLD: 2.97 10*3/MM3 (ref 3.4–10.8)

## 2020-01-17 PROCEDURE — 85025 COMPLETE CBC W/AUTO DIFF WBC: CPT

## 2020-01-17 PROCEDURE — 80053 COMPREHEN METABOLIC PANEL: CPT

## 2020-01-17 PROCEDURE — 36415 COLL VENOUS BLD VENIPUNCTURE: CPT

## 2020-01-20 ENCOUNTER — OFFICE VISIT (OUTPATIENT)
Dept: ONCOLOGY | Facility: CLINIC | Age: 80
End: 2020-01-20

## 2020-01-20 ENCOUNTER — APPOINTMENT (OUTPATIENT)
Dept: LAB | Facility: HOSPITAL | Age: 80
End: 2020-01-20

## 2020-01-20 VITALS
TEMPERATURE: 98.7 F | HEART RATE: 78 BPM | WEIGHT: 188.8 LBS | HEIGHT: 76 IN | SYSTOLIC BLOOD PRESSURE: 167 MMHG | DIASTOLIC BLOOD PRESSURE: 74 MMHG | BODY MASS INDEX: 22.99 KG/M2 | OXYGEN SATURATION: 98 % | RESPIRATION RATE: 12 BRPM

## 2020-01-20 DIAGNOSIS — D47.2 SMOLDERING MYELOMA: Primary | ICD-10-CM

## 2020-01-20 DIAGNOSIS — D70.8 OTHER NEUTROPENIA (HCC): ICD-10-CM

## 2020-01-20 LAB
ALBUMIN SERPL-MCNC: 3.7 G/DL (ref 2.9–4.4)
ALBUMIN/GLOB SERPL: 1.1 {RATIO} (ref 0.7–1.7)
ALPHA1 GLOB FLD ELPH-MCNC: 0.2 G/DL (ref 0–0.4)
ALPHA2 GLOB SERPL ELPH-MCNC: 0.5 G/DL (ref 0.4–1)
B-GLOBULIN SERPL ELPH-MCNC: 0.9 G/DL (ref 0.7–1.3)
GAMMA GLOB SERPL ELPH-MCNC: 1.7 G/DL (ref 0.4–1.8)
GLOBULIN SER CALC-MCNC: 3.3 G/DL (ref 2.2–3.9)
IGA SERPL-MCNC: 27 MG/DL (ref 61–437)
IGG SERPL-MCNC: 1831 MG/DL (ref 700–1600)
IGM SERPL-MCNC: 41 MG/DL (ref 15–143)
KAPPA LC SERPL-MCNC: 37.2 MG/L (ref 3.3–19.4)
KAPPA LC/LAMBDA SER: 8.65 {RATIO} (ref 0.26–1.65)
LAMBDA LC FREE SERPL-MCNC: 4.3 MG/L (ref 5.7–26.3)
Lab: ABNORMAL
M-SPIKE: 1.5 G/DL
PROT PATTERN SERPL IFE-IMP: ABNORMAL
PROT SERPL-MCNC: 7 G/DL (ref 6–8.5)

## 2020-01-20 PROCEDURE — 99213 OFFICE O/P EST LOW 20 MIN: CPT | Performed by: INTERNAL MEDICINE

## 2020-01-20 NOTE — PROGRESS NOTES
Subjective     REASON FOR FOLLOW-UP:  Chronic leukopenia, smoldering myeloma, anemia                               REQUESTING PHYSICIAN:  Eduardo    RECORDS OBTAINED:  Records of the patients history including those obtained from the referring provider were reviewed and summarized in detail.      History of Present Illness   This is a very pleasant 79-year-old man returning today for follow-up of smoldering myeloma.  He returned today feeling well.  He denies shortness of breath, lightheadedness, bleeding, cough or significant pain.      Past Medical History:   Diagnosis Date   • Arthritis     hands   • CAD (coronary artery disease)    • Diabetes (CMS/HCC)    • GERD (gastroesophageal reflux disease)    • H/O MGUS (monoclonal gammopathy of unknown significance)    • History of colon polyps    • History of herpes zoster 2007   • History of pneumonia    • Hyperlipidemia    • Hypertension    • Neutropenia (CMS/HCC)     H/O hereditary neutropenia   • Prostate cancer (CMS/HCC) 2007   • Stroke (CMS/HCC) 1997        Past Surgical History:   Procedure Laterality Date   • ANAL FISSURECTOMY     • CATARACT EXTRACTION Bilateral 2009   • COLONOSCOPY  06/2014    cecal polyp, sigmoid diverticulosis   • ENDOSCOPY  2001   • OTHER SURGICAL HISTORY  2007    Decortication and right lower lobe with resection   • PROSTATE BIOPSY  2007   • THORACOSCOPY          Current Outpatient Medications on File Prior to Visit   Medication Sig Dispense Refill   • atorvastatin (LIPITOR) 40 MG tablet Daily.     • clopidogrel (PLAVIX) 75 MG tablet Take 75 mg by mouth.     • cyclobenzaprine (FLEXERIL) 10 MG tablet Take 1 tablet by mouth 3 (Three) Times a Day As Needed for Muscle Spasms. 30 tablet 0   • diclofenac (VOLTAREN) 1 % gel gel Voltaren 1 % topical gel     • esomeprazole (nexIUM) 40 MG capsule Take 40 mg by mouth Every Morning Before Breakfast.     • HYDROcodone-acetaminophen (NORCO) 5-325 MG per tablet Take 1 tablet by mouth Every 6 (Six) Hours As  Needed.     • irbesartan-hydrochlorothiazide (AVALIDE) 300-12.5 MG tablet      • Multiple Vitamins-Minerals (MULTIVITAMIN ADULTS 50+ PO) Take  by mouth Daily.     • pregabalin (LYRICA) 50 MG capsule Take 50 mg by mouth Daily.     • sildenafil (REVATIO) 20 MG tablet TK 2 TS PO QD PRN  3   • valsartan-hydrochlorothiazide (DIOVAN-HCT) 320-25 MG per tablet 1 tablet Daily.       No current facility-administered medications on file prior to visit.         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 The patient blows a trumpet and enjoys vacationing at [place]. 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        Family History   Problem Relation Age of Onset   • Uterine cancer Mother    • Hypertension Other    • Hyperlipidemia Other    • Arthritis Other    • Stroke Other    • Heart disease Sister    • Hypertension Brother         Review of Systems   Constitutional: Negative.    HENT: Negative.    Eyes: Negative.    Respiratory: Negative.    Cardiovascular: Negative.    Gastrointestinal: Negative.    Genitourinary: Negative.    Musculoskeletal: Positive for arthralgias.   Skin: Negative.    Allergic/Immunologic: Negative.    Neurological: Negative.    Hematological: Negative.    Psychiatric/Behavioral: Negative.     ROS unchanged- 1/20/2020      Objective     Vitals:    01/20/20 1402   BP: 167/74   Pulse: 78   Resp: 12   Temp: 98.7 °F (37.1 °C)   TempSrc: Oral   SpO2: 98%   Weight: 85.6 kg (188 lb 12.8 oz)   Height:  "192 cm (75.59\")   PainSc: 0-No pain     Current Status 2020   ECOG score 0       Physical Exam    Con: pleasant, well-appearing man  HEENT: no icterus, no thrush, moist membranes  CV: RRR, S1S2  RESP: CTA bilat, no wheezing  GI: soft, nontender, no splenomegaly, +BS  MS: no edema  SKIN: no petechiae or bruising  NEURO: alert and oriented x3, normal strength, normal muscle tone  PSYCH: normal mood    Physical exam performed and unchanged from above-2020    RECENT LABS:  Hematology WBC   Date Value Ref Range Status   2020 2.97 (L) 3.40 - 10.80 10*3/mm3 Final     RBC   Date Value Ref Range Status   2020 3.81 (L) 4.14 - 5.80 10*6/mm3 Final     Hemoglobin   Date Value Ref Range Status   2020 13.0 13.0 - 17.7 g/dL Final     Hematocrit   Date Value Ref Range Status   2020 37.3 (L) 37.5 - 51.0 % Final     Platelets   Date Value Ref Range Status   2020 189 140 - 450 10*3/mm3 Final        Lab Results   Component Value Date    GLUCOSE 135 (H) 2020    BUN 11 2020    CREATININE 1.08 2020    EGFRIFAFRI 80 2020    BCR 10.2 2020    K 4.0 2020    CO2 24.0 2020    CALCIUM 9.0 2020    PROTENTOTREF 7.0 2020    ALBUMIN 4.00 2020    ALBUMIN 3.7 2020    LABIL2 1.1 2020    AST 36 2020    ALT 36 2020     Peripheral blood flow cytometry 3/12/18: No abnormal myeloid or lymphoid populations identified  B2M.8  FLC ratio 3.96-->4.55->5.64  ESR 12  M-spike 1.4 g/dL IgG kappa-->1.4 g/dL-->1.2    Skeletal survey 3/12/18:  Negative.        Assessment/Plan     1.  Chronic leukopenia: The patient's ANC has run borderline low for a number of years.  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      Bone marrow exam was performed 19.  The bone marrow was mildly hypercellular 40% with involvement of a plasma cell dyscrasia 8% " by aspirate and 15% by core biopsy.  Plasma cells were monoclonal kappa by flow cytometry.  There was trilineage hematopoiesis.  Congo red stain was negative for amyloid deposition.  Cytogenetics were normal; complete FISH panel could not be performed.  No dyspoiesis of the white cells noted.    4.  Smoldering myeloma:  M spike this visit stable 1.4 g/dL IgG.  He does have increased number of plasma cells by bone marrow biopsy 8% aspirate/15% core biopsy monoclonal kappa.  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  CBC and CMP reviewed today are stable.  His protein electrophoresis/immunofixation and light chain ratio are pending but given the other stable labs I do not predict a significant change in his M spike.  We will call the patient when results are available and if stable plan 4-month follow-up with repeat labs.    3.  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  Iron studies, B12, folic acid without obvious deficiency.  Hemoglobin currently stable to improved 13.0    Follow-up in 4 months with CBC, CMP, protein electrophoresis/immunofixation and light chain ratio ordered

## 2020-01-21 ENCOUNTER — TELEPHONE (OUTPATIENT)
Dept: ONCOLOGY | Facility: CLINIC | Age: 80
End: 2020-01-21

## 2020-01-21 NOTE — TELEPHONE ENCOUNTER
Called Mr. Story per Dr Daigle. No answer. So after checking his verbal consent, I left a message to let him know that his protein levels returned stable and Dr. Daigle will recheck them as scheduled.    ----- Message from Josh Daigle MD sent at 1/21/2020  9:40 AM EST -----  Please let him know his protein levels returned stable.  We will recheck as scheduled.  ----- Message -----  From: Lab, Background User  Sent: 1/17/2020   7:53 AM EST  To: Josh Daigle MD

## 2020-05-15 ENCOUNTER — APPOINTMENT (OUTPATIENT)
Dept: LAB | Facility: HOSPITAL | Age: 80
End: 2020-05-15

## 2020-05-22 ENCOUNTER — APPOINTMENT (OUTPATIENT)
Dept: LAB | Facility: HOSPITAL | Age: 80
End: 2020-05-22

## 2020-05-27 ENCOUNTER — APPOINTMENT (OUTPATIENT)
Dept: LAB | Facility: HOSPITAL | Age: 80
End: 2020-05-27

## 2020-05-27 ENCOUNTER — LAB (OUTPATIENT)
Dept: LAB | Facility: HOSPITAL | Age: 80
End: 2020-05-27

## 2020-05-27 DIAGNOSIS — D47.2 SMOLDERING MYELOMA: ICD-10-CM

## 2020-05-27 LAB
ALBUMIN SERPL-MCNC: 4 G/DL (ref 3.5–5.2)
ALBUMIN/GLOB SERPL: 1.2 G/DL (ref 1.1–2.4)
ALP SERPL-CCNC: 57 U/L (ref 38–116)
ALT SERPL W P-5'-P-CCNC: 33 U/L (ref 0–41)
ANION GAP SERPL CALCULATED.3IONS-SCNC: 10.3 MMOL/L (ref 5–15)
AST SERPL-CCNC: 34 U/L (ref 0–40)
BASOPHILS # BLD AUTO: 0.01 10*3/MM3 (ref 0–0.2)
BASOPHILS NFR BLD AUTO: 0.3 % (ref 0–1.5)
BILIRUB SERPL-MCNC: 0.6 MG/DL (ref 0.2–1.2)
BUN BLD-MCNC: 15 MG/DL (ref 6–20)
BUN/CREAT SERPL: 14.6 (ref 7.3–30)
CALCIUM SPEC-SCNC: 9 MG/DL (ref 8.5–10.2)
CHLORIDE SERPL-SCNC: 106 MMOL/L (ref 98–107)
CO2 SERPL-SCNC: 23.7 MMOL/L (ref 22–29)
CREAT BLD-MCNC: 1.03 MG/DL (ref 0.7–1.3)
DEPRECATED RDW RBC AUTO: 44 FL (ref 37–54)
EOSINOPHIL # BLD AUTO: 0.06 10*3/MM3 (ref 0–0.4)
EOSINOPHIL NFR BLD AUTO: 2 % (ref 0.3–6.2)
ERYTHROCYTE [DISTWIDTH] IN BLOOD BY AUTOMATED COUNT: 12.3 % (ref 12.3–15.4)
GFR SERPL CREATININE-BSD FRML MDRD: 84 ML/MIN/1.73
GLOBULIN UR ELPH-MCNC: 3.4 GM/DL (ref 1.8–3.5)
GLUCOSE BLD-MCNC: 119 MG/DL (ref 74–124)
HCT VFR BLD AUTO: 35.5 % (ref 37.5–51)
HGB BLD-MCNC: 12.4 G/DL (ref 13–17.7)
IMM GRANULOCYTES # BLD AUTO: 0 10*3/MM3 (ref 0–0.05)
IMM GRANULOCYTES NFR BLD AUTO: 0 % (ref 0–0.5)
LYMPHOCYTES # BLD AUTO: 1.34 10*3/MM3 (ref 0.7–3.1)
LYMPHOCYTES NFR BLD AUTO: 44.1 % (ref 19.6–45.3)
MCH RBC QN AUTO: 34.2 PG (ref 26.6–33)
MCHC RBC AUTO-ENTMCNC: 34.9 G/DL (ref 31.5–35.7)
MCV RBC AUTO: 97.8 FL (ref 79–97)
MONOCYTES # BLD AUTO: 0.32 10*3/MM3 (ref 0.1–0.9)
MONOCYTES NFR BLD AUTO: 10.5 % (ref 5–12)
NEUTROPHILS # BLD AUTO: 1.31 10*3/MM3 (ref 1.7–7)
NEUTROPHILS NFR BLD AUTO: 43.1 % (ref 42.7–76)
NRBC BLD AUTO-RTO: 0 /100 WBC (ref 0–0.2)
PLATELET # BLD AUTO: 175 10*3/MM3 (ref 140–450)
PMV BLD AUTO: 8.8 FL (ref 6–12)
POTASSIUM BLD-SCNC: 3.9 MMOL/L (ref 3.5–4.7)
PROT SERPL-MCNC: 7.4 G/DL (ref 6.3–8)
RBC # BLD AUTO: 3.63 10*6/MM3 (ref 4.14–5.8)
SODIUM BLD-SCNC: 140 MMOL/L (ref 134–145)
WBC NRBC COR # BLD: 3.04 10*3/MM3 (ref 3.4–10.8)

## 2020-05-27 PROCEDURE — 80053 COMPREHEN METABOLIC PANEL: CPT

## 2020-05-27 PROCEDURE — 36415 COLL VENOUS BLD VENIPUNCTURE: CPT

## 2020-05-27 PROCEDURE — 85025 COMPLETE CBC W/AUTO DIFF WBC: CPT

## 2020-05-28 LAB
ALBUMIN SERPL-MCNC: 3.6 G/DL (ref 2.9–4.4)
ALBUMIN/GLOB SERPL: 1.1 {RATIO} (ref 0.7–1.7)
ALPHA1 GLOB FLD ELPH-MCNC: 0.2 G/DL (ref 0–0.4)
ALPHA2 GLOB SERPL ELPH-MCNC: 0.6 G/DL (ref 0.4–1)
B-GLOBULIN SERPL ELPH-MCNC: 0.8 G/DL (ref 0.7–1.3)
GAMMA GLOB SERPL ELPH-MCNC: 1.7 G/DL (ref 0.4–1.8)
GLOBULIN SER CALC-MCNC: 3.3 G/DL (ref 2.2–3.9)
IGA SERPL-MCNC: 20 MG/DL (ref 61–437)
IGG SERPL-MCNC: 1909 MG/DL (ref 603–1613)
IGM SERPL-MCNC: 33 MG/DL (ref 15–143)
KAPPA LC SERPL-MCNC: 39.2 MG/L (ref 3.3–19.4)
KAPPA LC/LAMBDA SER: 10.05 {RATIO} (ref 0.26–1.65)
LAMBDA LC FREE SERPL-MCNC: 3.9 MG/L (ref 5.7–26.3)
Lab: ABNORMAL
M-SPIKE: 1.5 G/DL
PROT PATTERN SERPL IFE-IMP: ABNORMAL
PROT SERPL-MCNC: 6.9 G/DL (ref 6–8.5)

## 2020-06-08 ENCOUNTER — APPOINTMENT (OUTPATIENT)
Dept: LAB | Facility: HOSPITAL | Age: 80
End: 2020-06-08

## 2020-06-08 ENCOUNTER — OFFICE VISIT (OUTPATIENT)
Dept: ONCOLOGY | Facility: CLINIC | Age: 80
End: 2020-06-08

## 2020-06-08 VITALS
DIASTOLIC BLOOD PRESSURE: 74 MMHG | WEIGHT: 190 LBS | HEIGHT: 76 IN | SYSTOLIC BLOOD PRESSURE: 155 MMHG | HEART RATE: 66 BPM | BODY MASS INDEX: 23.14 KG/M2 | OXYGEN SATURATION: 97 % | TEMPERATURE: 98 F | RESPIRATION RATE: 14 BRPM

## 2020-06-08 DIAGNOSIS — D63.1 ANEMIA IN STAGE 3 CHRONIC KIDNEY DISEASE (HCC): ICD-10-CM

## 2020-06-08 DIAGNOSIS — D70.8 OTHER NEUTROPENIA (HCC): Primary | ICD-10-CM

## 2020-06-08 DIAGNOSIS — D47.2 SMOLDERING MYELOMA: ICD-10-CM

## 2020-06-08 DIAGNOSIS — N18.30 ANEMIA IN STAGE 3 CHRONIC KIDNEY DISEASE (HCC): ICD-10-CM

## 2020-06-08 PROCEDURE — 99213 OFFICE O/P EST LOW 20 MIN: CPT | Performed by: INTERNAL MEDICINE

## 2020-06-08 NOTE — PROGRESS NOTES
Subjective     REASON FOR FOLLOW-UP:  Chronic leukopenia, smoldering myeloma, anemia                               REQUESTING PHYSICIAN:  Eduardo    RECORDS OBTAINED:  Records of the patients history including those obtained from the referring provider were reviewed and summarized in detail.      History of Present Illness   This is a very pleasant 80-year-old man returning today for follow-up of smoldering myeloma.  He returned today feeling well.  He denies shortness of breath, lightheadedness, bleeding, cough or significant pain other than some pain in the left knee related to osteoarthritis.      Past Medical History:   Diagnosis Date   • Arthritis     hands   • CAD (coronary artery disease)    • Diabetes (CMS/HCC)    • GERD (gastroesophageal reflux disease)    • H/O MGUS (monoclonal gammopathy of unknown significance)    • History of colon polyps    • History of herpes zoster 2007   • History of pneumonia    • Hyperlipidemia    • Hypertension    • Neutropenia (CMS/[facility])     H/O hereditary neutropenia   • Prostate cancer (CMS/HCC) 2007   • Stroke (CMS/[facility]) 1997        Past Surgical History:   Procedure Laterality Date   • ANAL FISSURECTOMY     • CATARACT EXTRACTION Bilateral 2009   • COLONOSCOPY  06/2014    cecal polyp, sigmoid diverticulosis   • ENDOSCOPY  2001   • OTHER SURGICAL HISTORY  2007    Decortication and right lower lobe with resection   • PROSTATE BIOPSY  2007   • THORACOSCOPY          Current Outpatient Medications on File Prior to Visit   Medication Sig Dispense Refill   • atorvastatin (LIPITOR) 40 MG tablet Daily.     • clopidogrel (PLAVIX) 75 MG tablet Take 75 mg by mouth.     • cyclobenzaprine (FLEXERIL) 10 MG tablet Take 1 tablet by mouth 3 (Three) Times a Day As Needed for Muscle Spasms. 30 tablet 0   • diclofenac (VOLTAREN) 1 % gel gel Voltaren 1 % topical gel     • esomeprazole (nexIUM) 40 MG capsule Take 40 mg by mouth Every Morning Before Breakfast.     • HYDROcodone-acetaminophen (NORCO)  5-325 MG per tablet Take 1 tablet by mouth Every 6 (Six) Hours As Needed.     • irbesartan-hydrochlorothiazide (AVALIDE) 300-12.5 MG tablet      • Multiple Vitamins-Minerals (MULTIVITAMIN ADULTS 50+ PO) Take  by mouth Daily.     • pregabalin (LYRICA) 50 MG capsule Take 50 mg by mouth Daily.     • sildenafil (REVATIO) 20 MG tablet TK 2 TS PO QD PRN  3   • valsartan-hydrochlorothiazide (DIOVAN-HCT) 320-25 MG per tablet 1 tablet Daily.       No current facility-administered medications on file prior to visit.         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 The patient blows a trumpet and enjoys vacationing at [place]. 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        Family History   Problem Relation Age of Onset   • Uterine cancer Mother    • Hypertension Other    • Hyperlipidemia Other    • Arthritis Other    • Stroke Other    • Heart disease Sister    • Hypertension Brother         Review of Systems   Constitutional: Negative.    HENT: Negative.    Eyes: Negative.    Respiratory: Negative.    Cardiovascular: Negative.    Gastrointestinal: Negative.    Genitourinary: Negative.    Musculoskeletal: Positive for arthralgias.   Skin: Negative.    Allergic/Immunologic: Negative.    Neurological: Negative.    Hematological: Negative.    Psychiatric/Behavioral: Negative.     ROS unchanged- 6/8/2020      Objective     Vitals:    06/08/20 0857   BP: 155/74   Pulse: 66   Resp: 14   Temp: 98 °F (36.7 °C)   TempSrc:  "Tympanic   SpO2: 97%   Weight: 86.2 kg (190 lb)   Height: 192 cm (75.59\")   PainSc: 0-No pain     Current Status 2020   ECOG score 0       Physical Exam    Con: pleasant, well-appearing man  HEENT: no icterus, no thrush, moist membranes  CV: RRR, S1S2  RESP: CTA bilat, no wheezing  GI: soft, nontender, no splenomegaly, +BS  MS: no edema, left knee in a brace  SKIN: no petechiae or bruising  NEURO: alert and oriented x3, normal strength, normal muscle tone  PSYCH: normal mood        RECENT LABS:  Hematology WBC   Date Value Ref Range Status   2020 3.04 (L) 3.40 - 10.80 10*3/mm3 Final     RBC   Date Value Ref Range Status   2020 3.63 (L) 4.14 - 5.80 10*6/mm3 Final     Hemoglobin   Date Value Ref Range Status   2020 12.4 (L) 13.0 - 17.7 g/dL Final     Hematocrit   Date Value Ref Range Status   2020 35.5 (L) 37.5 - 51.0 % Final     Platelets   Date Value Ref Range Status   2020 175 140 - 450 10*3/mm3 Final        Lab Results   Component Value Date    GLUCOSE 119 2020    BUN 15 2020    CREATININE 1.03 2020    EGFRIFAFRI 84 2020    BCR 14.6 2020    K 3.9 2020    CO2 23.7 2020    CALCIUM 9.0 2020    PROTENTOTREF 6.9 2020    ALBUMIN 4.00 2020    ALBUMIN 3.6 2020    LABIL2 1.1 2020    AST 34 2020    ALT 33 2020     Peripheral blood flow cytometry 3/12/18: No abnormal myeloid or lymphoid populations identified  B2M.8  FLC ratio 3.96-->4.55->5.64-->10.05  ESR 12  M-spike 1.4 g/dL IgG kappa-->1.4 g/dL-->1.2-->1.5    Skeletal survey 3/12/18:  Negative.        Assessment/Plan     1.  Chronic leukopenia: The patient's ANC has run borderline low for a number of years.  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      Bone marrow exam was performed 19.  The bone marrow was mildly hypercellular 40% with " involvement of a plasma cell dyscrasia 8% by aspirate and 15% by core biopsy.  Plasma cells were monoclonal kappa by flow cytometry.  There was trilineage hematopoiesis.  Congo red stain was negative for amyloid deposition.  Cytogenetics were normal; complete FISH panel could not be performed.  No dyspoiesis of the white cells noted.    4.  Smoldering myeloma:  M spike this visit stable 1.4 g/dL IgG.  He does have increased number of plasma cells by bone marrow biopsy 8% aspirate/15% core biopsy monoclonal kappa.  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  CBC and CMP reviewed today are stable.  Electrophoresis shows stable M spike 1.5 g/dL.  The light chain ratio is mildly higher 10.05.  Given lack of endorgan damage we will continue observation with repeat labs to be done in 4 months.    3.  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  Iron studies, B12, folic acid without obvious deficiency.  Hemoglobin currently stable to improved 12.4    Follow-up in 4 months with CBC, CMP, protein electrophoresis/immunofixation and light chain ratio ordered

## 2020-06-26 ENCOUNTER — HOSPITAL ENCOUNTER (INPATIENT)
Facility: HOSPITAL | Age: 80
LOS: 2 days | Discharge: HOME | End: 2020-06-29
Attending: EMERGENCY MEDICINE | Admitting: INTERNAL MEDICINE

## 2020-06-26 ENCOUNTER — APPOINTMENT (OUTPATIENT)
Dept: GENERAL RADIOLOGY | Facility: HOSPITAL | Age: 80
End: 2020-06-26

## 2020-06-26 ENCOUNTER — APPOINTMENT (OUTPATIENT)
Dept: CT IMAGING | Facility: HOSPITAL | Age: 80
End: 2020-06-26

## 2020-06-26 DIAGNOSIS — U07.1 PNEUMONIA DUE TO COVID-19 VIRUS: ICD-10-CM

## 2020-06-26 DIAGNOSIS — J12.82 PNEUMONIA DUE TO COVID-19 VIRUS: ICD-10-CM

## 2020-06-26 DIAGNOSIS — E86.0 DEHYDRATION: ICD-10-CM

## 2020-06-26 DIAGNOSIS — R74.8 ELEVATED LIPASE: ICD-10-CM

## 2020-06-26 DIAGNOSIS — N28.9 MILD RENAL INSUFFICIENCY: Primary | ICD-10-CM

## 2020-06-26 LAB
ALBUMIN SERPL-MCNC: 3.5 G/DL (ref 3.5–5.2)
ALBUMIN/GLOB SERPL: 0.8 G/DL
ALP SERPL-CCNC: 47 U/L (ref 39–117)
ALT SERPL W P-5'-P-CCNC: 37 U/L (ref 1–41)
ANION GAP SERPL CALCULATED.3IONS-SCNC: 11 MMOL/L (ref 5–15)
AST SERPL-CCNC: 66 U/L (ref 1–40)
BACTERIA UR QL AUTO: NORMAL /HPF
BILIRUB SERPL-MCNC: 0.8 MG/DL (ref 0.2–1.2)
BILIRUB UR QL STRIP: NEGATIVE
BUN BLD-MCNC: 24 MG/DL (ref 8–23)
BUN/CREAT SERPL: 14 (ref 7–25)
CALCIUM SPEC-SCNC: 8.8 MG/DL (ref 8.6–10.5)
CHLORIDE SERPL-SCNC: 101 MMOL/L (ref 98–107)
CLARITY UR: CLEAR
CO2 SERPL-SCNC: 23 MMOL/L (ref 22–29)
COLOR UR: YELLOW
CREAT BLD-MCNC: 1.72 MG/DL (ref 0.76–1.27)
D DIMER PPP FEU-MCNC: 0.81 MCGFEU/ML (ref 0–0.49)
D-LACTATE SERPL-SCNC: 1.1 MMOL/L (ref 0.5–2)
DEPRECATED RDW RBC AUTO: 44.5 FL (ref 37–54)
ERYTHROCYTE [DISTWIDTH] IN BLOOD BY AUTOMATED COUNT: 12.9 % (ref 12.3–15.4)
FERRITIN SERPL-MCNC: 1150 NG/ML (ref 30–400)
GFR SERPL CREATININE-BSD FRML MDRD: 47 ML/MIN/1.73
GLOBULIN UR ELPH-MCNC: 4.4 GM/DL
GLUCOSE BLD-MCNC: 131 MG/DL (ref 65–99)
GLUCOSE UR STRIP-MCNC: NEGATIVE MG/DL
HCT VFR BLD AUTO: 33.1 % (ref 37.5–51)
HGB BLD-MCNC: 11.6 G/DL (ref 13–17.7)
HGB UR QL STRIP.AUTO: NEGATIVE
HOLD SPECIMEN: NORMAL
HYALINE CASTS UR QL AUTO: NORMAL /LPF
KETONES UR QL STRIP: NEGATIVE
LDH SERPL-CCNC: 475 U/L (ref 135–225)
LEUKOCYTE ESTERASE UR QL STRIP.AUTO: NEGATIVE
LIPASE SERPL-CCNC: 285 U/L (ref 13–60)
LYMPHOCYTES # BLD MANUAL: 0.49 10*3/MM3 (ref 0.7–3.1)
LYMPHOCYTES NFR BLD MANUAL: 13 % (ref 19.6–45.3)
LYMPHOCYTES NFR BLD MANUAL: 7 % (ref 5–12)
MCH RBC QN AUTO: 33.6 PG (ref 26.6–33)
MCHC RBC AUTO-ENTMCNC: 35 G/DL (ref 31.5–35.7)
MCV RBC AUTO: 95.9 FL (ref 79–97)
MONOCYTES # BLD AUTO: 0.27 10*3/MM3 (ref 0.1–0.9)
NEUTROPHILS # BLD AUTO: 3.03 10*3/MM3 (ref 1.7–7)
NEUTROPHILS NFR BLD MANUAL: 80 % (ref 42.7–76)
NITRITE UR QL STRIP: NEGATIVE
PH UR STRIP.AUTO: 5.5 [PH] (ref 5–8)
PLAT MORPH BLD: NORMAL
PLATELET # BLD AUTO: 231 10*3/MM3 (ref 140–450)
PMV BLD AUTO: 9.2 FL (ref 6–12)
POTASSIUM BLD-SCNC: 3.7 MMOL/L (ref 3.5–5.2)
PROCALCITONIN SERPL-MCNC: 0.13 NG/ML (ref 0.1–0.25)
PROT SERPL-MCNC: 7.9 G/DL (ref 6–8.5)
PROT UR QL STRIP: ABNORMAL
RBC # BLD AUTO: 3.45 10*6/MM3 (ref 4.14–5.8)
RBC # UR: NORMAL /HPF
RBC MORPH BLD: NORMAL
REF LAB TEST METHOD: NORMAL
SODIUM BLD-SCNC: 135 MMOL/L (ref 136–145)
SP GR UR STRIP: 1.01 (ref 1–1.03)
SQUAMOUS #/AREA URNS HPF: NORMAL /HPF
UROBILINOGEN UR QL STRIP: ABNORMAL
WBC MORPH BLD: NORMAL
WBC NRBC COR # BLD: 3.79 10*3/MM3 (ref 3.4–10.8)
WBC UR QL AUTO: NORMAL /HPF
WHOLE BLOOD HOLD SPECIMEN: NORMAL
WHOLE BLOOD HOLD SPECIMEN: NORMAL

## 2020-06-26 PROCEDURE — 82728 ASSAY OF FERRITIN: CPT | Performed by: INTERNAL MEDICINE

## 2020-06-26 PROCEDURE — 85025 COMPLETE CBC W/AUTO DIFF WBC: CPT | Performed by: EMERGENCY MEDICINE

## 2020-06-26 PROCEDURE — 80053 COMPREHEN METABOLIC PANEL: CPT | Performed by: EMERGENCY MEDICINE

## 2020-06-26 PROCEDURE — G0378 HOSPITAL OBSERVATION PER HR: HCPCS

## 2020-06-26 PROCEDURE — 85379 FIBRIN DEGRADATION QUANT: CPT | Performed by: INTERNAL MEDICINE

## 2020-06-26 PROCEDURE — 83520 IMMUNOASSAY QUANT NOS NONAB: CPT | Performed by: INTERNAL MEDICINE

## 2020-06-26 PROCEDURE — 84145 PROCALCITONIN (PCT): CPT | Performed by: EMERGENCY MEDICINE

## 2020-06-26 PROCEDURE — 85007 BL SMEAR W/DIFF WBC COUNT: CPT | Performed by: EMERGENCY MEDICINE

## 2020-06-26 PROCEDURE — 87040 BLOOD CULTURE FOR BACTERIA: CPT | Performed by: EMERGENCY MEDICINE

## 2020-06-26 PROCEDURE — 70450 CT HEAD/BRAIN W/O DYE: CPT

## 2020-06-26 PROCEDURE — 86140 C-REACTIVE PROTEIN: CPT | Performed by: INTERNAL MEDICINE

## 2020-06-26 PROCEDURE — 99285 EMERGENCY DEPT VISIT HI MDM: CPT

## 2020-06-26 PROCEDURE — 83615 LACTATE (LD) (LDH) ENZYME: CPT | Performed by: INTERNAL MEDICINE

## 2020-06-26 PROCEDURE — 83605 ASSAY OF LACTIC ACID: CPT | Performed by: EMERGENCY MEDICINE

## 2020-06-26 PROCEDURE — 25010000002 ENOXAPARIN PER 10 MG: Performed by: INTERNAL MEDICINE

## 2020-06-26 PROCEDURE — 71045 X-RAY EXAM CHEST 1 VIEW: CPT

## 2020-06-26 PROCEDURE — 83735 ASSAY OF MAGNESIUM: CPT | Performed by: INTERNAL MEDICINE

## 2020-06-26 PROCEDURE — 82550 ASSAY OF CK (CPK): CPT | Performed by: INTERNAL MEDICINE

## 2020-06-26 PROCEDURE — 36415 COLL VENOUS BLD VENIPUNCTURE: CPT

## 2020-06-26 PROCEDURE — 81001 URINALYSIS AUTO W/SCOPE: CPT | Performed by: INTERNAL MEDICINE

## 2020-06-26 PROCEDURE — 83690 ASSAY OF LIPASE: CPT | Performed by: EMERGENCY MEDICINE

## 2020-06-26 RX ORDER — SODIUM CHLORIDE 0.9 % (FLUSH) 0.9 %
10 SYRINGE (ML) INJECTION AS NEEDED
Status: DISCONTINUED | OUTPATIENT
Start: 2020-06-26 | End: 2020-06-29 | Stop reason: HOSPADM

## 2020-06-26 RX ORDER — ACETAMINOPHEN 325 MG/1
650 TABLET ORAL EVERY 4 HOURS PRN
Status: DISCONTINUED | OUTPATIENT
Start: 2020-06-26 | End: 2020-06-29 | Stop reason: HOSPADM

## 2020-06-26 RX ORDER — HYDROCODONE BITARTRATE AND ACETAMINOPHEN 5; 325 MG/1; MG/1
1 TABLET ORAL EVERY 6 HOURS PRN
Status: DISCONTINUED | OUTPATIENT
Start: 2020-06-26 | End: 2020-06-29 | Stop reason: HOSPADM

## 2020-06-26 RX ORDER — PANTOPRAZOLE SODIUM 40 MG/1
40 TABLET, DELAYED RELEASE ORAL EVERY MORNING
Status: DISCONTINUED | OUTPATIENT
Start: 2020-06-27 | End: 2020-06-29 | Stop reason: HOSPADM

## 2020-06-26 RX ORDER — ATORVASTATIN CALCIUM 20 MG/1
40 TABLET, FILM COATED ORAL DAILY
Status: DISCONTINUED | OUTPATIENT
Start: 2020-06-26 | End: 2020-06-29 | Stop reason: HOSPADM

## 2020-06-26 RX ORDER — SODIUM CHLORIDE 9 MG/ML
125 INJECTION, SOLUTION INTRAVENOUS CONTINUOUS
Status: DISCONTINUED | OUTPATIENT
Start: 2020-06-26 | End: 2020-06-27

## 2020-06-26 RX ORDER — PREGABALIN 50 MG/1
50 CAPSULE ORAL EVERY 12 HOURS SCHEDULED
Status: DISCONTINUED | OUTPATIENT
Start: 2020-06-26 | End: 2020-06-29 | Stop reason: HOSPADM

## 2020-06-26 RX ORDER — SODIUM CHLORIDE 0.9 % (FLUSH) 0.9 %
10 SYRINGE (ML) INJECTION EVERY 12 HOURS SCHEDULED
Status: DISCONTINUED | OUTPATIENT
Start: 2020-06-26 | End: 2020-06-29 | Stop reason: HOSPADM

## 2020-06-26 RX ORDER — CLOPIDOGREL BISULFATE 75 MG/1
75 TABLET ORAL DAILY
Status: DISCONTINUED | OUTPATIENT
Start: 2020-06-26 | End: 2020-06-29 | Stop reason: HOSPADM

## 2020-06-26 RX ADMIN — ACETAMINOPHEN 650 MG: 325 TABLET, FILM COATED ORAL at 18:03

## 2020-06-26 RX ADMIN — PREGABALIN 50 MG: 50 CAPSULE ORAL at 21:28

## 2020-06-26 RX ADMIN — ENOXAPARIN SODIUM 40 MG: 40 INJECTION SUBCUTANEOUS at 17:24

## 2020-06-26 RX ADMIN — SODIUM CHLORIDE 500 ML: 9 INJECTION, SOLUTION INTRAVENOUS at 11:28

## 2020-06-26 RX ADMIN — SODIUM CHLORIDE, PRESERVATIVE FREE 10 ML: 5 INJECTION INTRAVENOUS at 22:14

## 2020-06-26 RX ADMIN — SODIUM CHLORIDE 125 ML/HR: 9 INJECTION, SOLUTION INTRAVENOUS at 21:28

## 2020-06-26 RX ADMIN — SODIUM CHLORIDE 125 ML/HR: 9 INJECTION, SOLUTION INTRAVENOUS at 15:49

## 2020-06-27 LAB
ALBUMIN SERPL-MCNC: 2.9 G/DL (ref 3.5–5.2)
ALBUMIN/GLOB SERPL: 0.8 G/DL
ALP SERPL-CCNC: 40 U/L (ref 39–117)
ALT SERPL W P-5'-P-CCNC: 35 U/L (ref 1–41)
ANION GAP SERPL CALCULATED.3IONS-SCNC: 13.2 MMOL/L (ref 5–15)
AST SERPL-CCNC: 54 U/L (ref 1–40)
BASOPHILS # BLD AUTO: 0 10*3/MM3 (ref 0–0.2)
BASOPHILS NFR BLD AUTO: 0 % (ref 0–1.5)
BILIRUB SERPL-MCNC: 0.6 MG/DL (ref 0.2–1.2)
BUN BLD-MCNC: 24 MG/DL (ref 8–23)
BUN/CREAT SERPL: 20.9 (ref 7–25)
CALCIUM SPEC-SCNC: 7.9 MG/DL (ref 8.6–10.5)
CHLORIDE SERPL-SCNC: 103 MMOL/L (ref 98–107)
CK SERPL-CCNC: 553 U/L (ref 20–200)
CO2 SERPL-SCNC: 17.8 MMOL/L (ref 22–29)
CREAT BLD-MCNC: 1.15 MG/DL (ref 0.76–1.27)
CRP SERPL-MCNC: 10.99 MG/DL (ref 0–0.5)
D DIMER PPP FEU-MCNC: 0.8 MCGFEU/ML (ref 0–0.49)
DEPRECATED RDW RBC AUTO: 43.8 FL (ref 37–54)
EOSINOPHIL # BLD AUTO: 0.01 10*3/MM3 (ref 0–0.4)
EOSINOPHIL NFR BLD AUTO: 0.3 % (ref 0.3–6.2)
ERYTHROCYTE [DISTWIDTH] IN BLOOD BY AUTOMATED COUNT: 12.9 % (ref 12.3–15.4)
FERRITIN SERPL-MCNC: 1078 NG/ML (ref 30–400)
FIBRINOGEN PPP-MCNC: 695 MG/DL (ref 219–464)
GFR SERPL CREATININE-BSD FRML MDRD: 74 ML/MIN/1.73
GLOBULIN UR ELPH-MCNC: 3.8 GM/DL
GLUCOSE BLD-MCNC: 88 MG/DL (ref 65–99)
HCT VFR BLD AUTO: 29.4 % (ref 37.5–51)
HGB BLD-MCNC: 10.3 G/DL (ref 13–17.7)
IMM GRANULOCYTES # BLD AUTO: 0.02 10*3/MM3 (ref 0–0.05)
IMM GRANULOCYTES NFR BLD AUTO: 0.6 % (ref 0–0.5)
LDH SERPL-CCNC: 377 U/L (ref 135–225)
LYMPHOCYTES # BLD AUTO: 0.57 10*3/MM3 (ref 0.7–3.1)
LYMPHOCYTES NFR BLD AUTO: 17.6 % (ref 19.6–45.3)
MAGNESIUM SERPL-MCNC: 1.8 MG/DL (ref 1.6–2.4)
MCH RBC QN AUTO: 33.3 PG (ref 26.6–33)
MCHC RBC AUTO-ENTMCNC: 35 G/DL (ref 31.5–35.7)
MCV RBC AUTO: 95.1 FL (ref 79–97)
MONOCYTES # BLD AUTO: 0.3 10*3/MM3 (ref 0.1–0.9)
MONOCYTES NFR BLD AUTO: 9.3 % (ref 5–12)
NEUTROPHILS # BLD AUTO: 2.33 10*3/MM3 (ref 1.7–7)
NEUTROPHILS NFR BLD AUTO: 72.2 % (ref 42.7–76)
NRBC BLD AUTO-RTO: 0 /100 WBC (ref 0–0.2)
PLATELET # BLD AUTO: 234 10*3/MM3 (ref 140–450)
PMV BLD AUTO: 9.4 FL (ref 6–12)
POTASSIUM BLD-SCNC: 3.1 MMOL/L (ref 3.5–5.2)
PROT SERPL-MCNC: 6.7 G/DL (ref 6–8.5)
RBC # BLD AUTO: 3.09 10*6/MM3 (ref 4.14–5.8)
SODIUM BLD-SCNC: 134 MMOL/L (ref 136–145)
WBC NRBC COR # BLD: 3.23 10*3/MM3 (ref 3.4–10.8)

## 2020-06-27 PROCEDURE — 80053 COMPREHEN METABOLIC PANEL: CPT | Performed by: INTERNAL MEDICINE

## 2020-06-27 PROCEDURE — 82728 ASSAY OF FERRITIN: CPT | Performed by: INTERNAL MEDICINE

## 2020-06-27 PROCEDURE — 85025 COMPLETE CBC W/AUTO DIFF WBC: CPT | Performed by: INTERNAL MEDICINE

## 2020-06-27 PROCEDURE — 85379 FIBRIN DEGRADATION QUANT: CPT | Performed by: INTERNAL MEDICINE

## 2020-06-27 PROCEDURE — 83615 LACTATE (LD) (LDH) ENZYME: CPT | Performed by: INTERNAL MEDICINE

## 2020-06-27 PROCEDURE — 85384 FIBRINOGEN ACTIVITY: CPT | Performed by: INTERNAL MEDICINE

## 2020-06-27 PROCEDURE — 25010000002 ENOXAPARIN PER 10 MG: Performed by: INTERNAL MEDICINE

## 2020-06-27 RX ORDER — DEXTROSE, SODIUM CHLORIDE, AND POTASSIUM CHLORIDE 5; .45; .15 G/100ML; G/100ML; G/100ML
50 INJECTION INTRAVENOUS CONTINUOUS
Status: DISCONTINUED | OUTPATIENT
Start: 2020-06-27 | End: 2020-06-29 | Stop reason: HOSPADM

## 2020-06-27 RX ORDER — DEXTROSE AND SODIUM CHLORIDE 5; .45 G/100ML; G/100ML
100 INJECTION, SOLUTION INTRAVENOUS CONTINUOUS
Status: DISCONTINUED | OUTPATIENT
Start: 2020-06-27 | End: 2020-06-27

## 2020-06-27 RX ORDER — POTASSIUM CHLORIDE 750 MG/1
40 CAPSULE, EXTENDED RELEASE ORAL AS NEEDED
Status: DISCONTINUED | OUTPATIENT
Start: 2020-06-27 | End: 2020-06-29 | Stop reason: HOSPADM

## 2020-06-27 RX ORDER — POTASSIUM CHLORIDE 1.5 G/1.77G
40 POWDER, FOR SOLUTION ORAL AS NEEDED
Status: DISCONTINUED | OUTPATIENT
Start: 2020-06-27 | End: 2020-06-29 | Stop reason: HOSPADM

## 2020-06-27 RX ADMIN — CLOPIDOGREL 75 MG: 75 TABLET, FILM COATED ORAL at 08:29

## 2020-06-27 RX ADMIN — SODIUM CHLORIDE 125 ML/HR: 9 INJECTION, SOLUTION INTRAVENOUS at 08:46

## 2020-06-27 RX ADMIN — ATORVASTATIN CALCIUM 40 MG: 20 TABLET, FILM COATED ORAL at 12:33

## 2020-06-27 RX ADMIN — POTASSIUM CHLORIDE 40 MEQ: 1.5 POWDER, FOR SOLUTION ORAL at 22:19

## 2020-06-27 RX ADMIN — PREGABALIN 50 MG: 50 CAPSULE ORAL at 22:28

## 2020-06-27 RX ADMIN — PANTOPRAZOLE SODIUM 40 MG: 40 TABLET, DELAYED RELEASE ORAL at 06:39

## 2020-06-27 RX ADMIN — SODIUM CHLORIDE, PRESERVATIVE FREE 10 ML: 5 INJECTION INTRAVENOUS at 08:29

## 2020-06-27 RX ADMIN — POTASSIUM CHLORIDE, DEXTROSE MONOHYDRATE AND SODIUM CHLORIDE 100 ML/HR: 150; 5; 450 INJECTION, SOLUTION INTRAVENOUS at 17:13

## 2020-06-27 RX ADMIN — POTASSIUM CHLORIDE 40 MEQ: 10 CAPSULE, COATED, EXTENDED RELEASE ORAL at 22:26

## 2020-06-27 RX ADMIN — POTASSIUM CHLORIDE 40 MEQ: 10 CAPSULE, COATED, EXTENDED RELEASE ORAL at 18:30

## 2020-06-27 RX ADMIN — ENOXAPARIN SODIUM 40 MG: 40 INJECTION SUBCUTANEOUS at 18:29

## 2020-06-27 RX ADMIN — ACETAMINOPHEN 650 MG: 325 TABLET, FILM COATED ORAL at 01:21

## 2020-06-27 RX ADMIN — SODIUM CHLORIDE, PRESERVATIVE FREE 10 ML: 5 INJECTION INTRAVENOUS at 22:28

## 2020-06-27 RX ADMIN — PREGABALIN 50 MG: 50 CAPSULE ORAL at 12:49

## 2020-06-27 RX ADMIN — LOSARTAN POTASSIUM: 50 TABLET, FILM COATED ORAL at 08:29

## 2020-06-28 LAB
ALBUMIN SERPL-MCNC: 2.8 G/DL (ref 3.5–5.2)
ALBUMIN/GLOB SERPL: 0.7 G/DL
ALP SERPL-CCNC: 43 U/L (ref 39–117)
ALT SERPL W P-5'-P-CCNC: 48 U/L (ref 1–41)
ANION GAP SERPL CALCULATED.3IONS-SCNC: 11.4 MMOL/L (ref 5–15)
AST SERPL-CCNC: 66 U/L (ref 1–40)
BASOPHILS # BLD AUTO: 0.01 10*3/MM3 (ref 0–0.2)
BASOPHILS NFR BLD AUTO: 0.3 % (ref 0–1.5)
BILIRUB SERPL-MCNC: 0.5 MG/DL (ref 0.2–1.2)
BUN BLD-MCNC: 14 MG/DL (ref 8–23)
BUN/CREAT SERPL: 14.7 (ref 7–25)
CALCIUM SPEC-SCNC: 8.2 MG/DL (ref 8.6–10.5)
CHLORIDE SERPL-SCNC: 112 MMOL/L (ref 98–107)
CK SERPL-CCNC: 340 U/L (ref 20–200)
CO2 SERPL-SCNC: 16.6 MMOL/L (ref 22–29)
CREAT BLD-MCNC: 0.95 MG/DL (ref 0.76–1.27)
CRP SERPL-MCNC: 10.79 MG/DL (ref 0–0.5)
DEPRECATED RDW RBC AUTO: 41.7 FL (ref 37–54)
EOSINOPHIL # BLD AUTO: 0.01 10*3/MM3 (ref 0–0.4)
EOSINOPHIL NFR BLD AUTO: 0.3 % (ref 0.3–6.2)
ERYTHROCYTE [DISTWIDTH] IN BLOOD BY AUTOMATED COUNT: 12.1 % (ref 12.3–15.4)
FERRITIN SERPL-MCNC: 1124 NG/ML (ref 30–400)
GFR SERPL CREATININE-BSD FRML MDRD: 93 ML/MIN/1.73
GLOBULIN UR ELPH-MCNC: 3.9 GM/DL
GLUCOSE BLD-MCNC: 136 MG/DL (ref 65–99)
HCT VFR BLD AUTO: 31.2 % (ref 37.5–51)
HGB BLD-MCNC: 11.3 G/DL (ref 13–17.7)
IL6 SERPL-MCNC: 48.2 PG/ML (ref 0–15.5)
IMM GRANULOCYTES # BLD AUTO: 0.01 10*3/MM3 (ref 0–0.05)
IMM GRANULOCYTES NFR BLD AUTO: 0.3 % (ref 0–0.5)
LYMPHOCYTES # BLD AUTO: 0.6 10*3/MM3 (ref 0.7–3.1)
LYMPHOCYTES NFR BLD AUTO: 16.4 % (ref 19.6–45.3)
MAGNESIUM SERPL-MCNC: 2 MG/DL (ref 1.6–2.4)
MCH RBC QN AUTO: 34 PG (ref 26.6–33)
MCHC RBC AUTO-ENTMCNC: 36.2 G/DL (ref 31.5–35.7)
MCV RBC AUTO: 94 FL (ref 79–97)
MONOCYTES # BLD AUTO: 0.41 10*3/MM3 (ref 0.1–0.9)
MONOCYTES NFR BLD AUTO: 11.2 % (ref 5–12)
NEUTROPHILS # BLD AUTO: 2.62 10*3/MM3 (ref 1.7–7)
NEUTROPHILS NFR BLD AUTO: 71.5 % (ref 42.7–76)
NRBC BLD AUTO-RTO: 0 /100 WBC (ref 0–0.2)
PLATELET # BLD AUTO: 292 10*3/MM3 (ref 140–450)
PMV BLD AUTO: 9.4 FL (ref 6–12)
POTASSIUM BLD-SCNC: 4.3 MMOL/L (ref 3.5–5.2)
PROT SERPL-MCNC: 6.7 G/DL (ref 6–8.5)
RBC # BLD AUTO: 3.32 10*6/MM3 (ref 4.14–5.8)
SODIUM BLD-SCNC: 140 MMOL/L (ref 136–145)
WBC NRBC COR # BLD: 3.66 10*3/MM3 (ref 3.4–10.8)

## 2020-06-28 PROCEDURE — 82550 ASSAY OF CK (CPK): CPT | Performed by: INTERNAL MEDICINE

## 2020-06-28 PROCEDURE — 83735 ASSAY OF MAGNESIUM: CPT | Performed by: INTERNAL MEDICINE

## 2020-06-28 PROCEDURE — 86140 C-REACTIVE PROTEIN: CPT | Performed by: INTERNAL MEDICINE

## 2020-06-28 PROCEDURE — 25010000002 ENOXAPARIN PER 10 MG: Performed by: INTERNAL MEDICINE

## 2020-06-28 PROCEDURE — 80053 COMPREHEN METABOLIC PANEL: CPT | Performed by: INTERNAL MEDICINE

## 2020-06-28 PROCEDURE — 82728 ASSAY OF FERRITIN: CPT | Performed by: INTERNAL MEDICINE

## 2020-06-28 PROCEDURE — 85025 COMPLETE CBC W/AUTO DIFF WBC: CPT | Performed by: INTERNAL MEDICINE

## 2020-06-28 RX ADMIN — CLOPIDOGREL 75 MG: 75 TABLET, FILM COATED ORAL at 10:15

## 2020-06-28 RX ADMIN — ATORVASTATIN CALCIUM 40 MG: 20 TABLET, FILM COATED ORAL at 10:14

## 2020-06-28 RX ADMIN — SODIUM CHLORIDE, PRESERVATIVE FREE 10 ML: 5 INJECTION INTRAVENOUS at 10:15

## 2020-06-28 RX ADMIN — PREGABALIN 50 MG: 50 CAPSULE ORAL at 10:14

## 2020-06-28 RX ADMIN — PREGABALIN 50 MG: 50 CAPSULE ORAL at 20:14

## 2020-06-28 RX ADMIN — POTASSIUM CHLORIDE, DEXTROSE MONOHYDRATE AND SODIUM CHLORIDE 50 ML/HR: 150; 5; 450 INJECTION, SOLUTION INTRAVENOUS at 13:18

## 2020-06-28 RX ADMIN — PANTOPRAZOLE SODIUM 40 MG: 40 TABLET, DELAYED RELEASE ORAL at 10:14

## 2020-06-28 RX ADMIN — LOSARTAN POTASSIUM: 50 TABLET, FILM COATED ORAL at 10:15

## 2020-06-28 RX ADMIN — ENOXAPARIN SODIUM 40 MG: 40 INJECTION SUBCUTANEOUS at 15:43

## 2020-06-28 RX ADMIN — POTASSIUM CHLORIDE, DEXTROSE MONOHYDRATE AND SODIUM CHLORIDE 100 ML/HR: 150; 5; 450 INJECTION, SOLUTION INTRAVENOUS at 03:26

## 2020-06-29 ENCOUNTER — READMISSION MANAGEMENT (OUTPATIENT)
Dept: CALL CENTER | Facility: HOSPITAL | Age: 80
End: 2020-06-29

## 2020-06-29 VITALS
DIASTOLIC BLOOD PRESSURE: 71 MMHG | TEMPERATURE: 98.3 F | WEIGHT: 181.2 LBS | RESPIRATION RATE: 18 BRPM | SYSTOLIC BLOOD PRESSURE: 144 MMHG | BODY MASS INDEX: 25.37 KG/M2 | HEIGHT: 71 IN | HEART RATE: 52 BPM | OXYGEN SATURATION: 96 %

## 2020-06-29 LAB
ALBUMIN SERPL-MCNC: 2.7 G/DL (ref 3.5–5.2)
ALBUMIN/GLOB SERPL: 0.7 G/DL
ALP SERPL-CCNC: 46 U/L (ref 39–117)
ALT SERPL W P-5'-P-CCNC: 71 U/L (ref 1–41)
ANION GAP SERPL CALCULATED.3IONS-SCNC: 8.3 MMOL/L (ref 5–15)
AST SERPL-CCNC: 77 U/L (ref 1–40)
BASOPHILS # BLD AUTO: 0 10*3/MM3 (ref 0–0.2)
BASOPHILS NFR BLD AUTO: 0 % (ref 0–1.5)
BILIRUB SERPL-MCNC: 0.7 MG/DL (ref 0.2–1.2)
BUN BLD-MCNC: 10 MG/DL (ref 8–23)
BUN/CREAT SERPL: 12.2 (ref 7–25)
CALCIUM SPEC-SCNC: 8.7 MG/DL (ref 8.6–10.5)
CHLORIDE SERPL-SCNC: 108 MMOL/L (ref 98–107)
CK SERPL-CCNC: 240 U/L (ref 20–200)
CO2 SERPL-SCNC: 18.7 MMOL/L (ref 22–29)
CREAT BLD-MCNC: 0.82 MG/DL (ref 0.76–1.27)
CRP SERPL-MCNC: 6.07 MG/DL (ref 0–0.5)
D DIMER PPP FEU-MCNC: 1.02 MCGFEU/ML (ref 0–0.49)
DEPRECATED RDW RBC AUTO: 40.4 FL (ref 37–54)
EOSINOPHIL # BLD AUTO: 0.03 10*3/MM3 (ref 0–0.4)
EOSINOPHIL NFR BLD AUTO: 0.7 % (ref 0.3–6.2)
ERYTHROCYTE [DISTWIDTH] IN BLOOD BY AUTOMATED COUNT: 11.9 % (ref 12.3–15.4)
FERRITIN SERPL-MCNC: 1004 NG/ML (ref 30–400)
FIBRINOGEN PPP-MCNC: 755 MG/DL (ref 219–464)
GFR SERPL CREATININE-BSD FRML MDRD: 110 ML/MIN/1.73
GLOBULIN UR ELPH-MCNC: 4.1 GM/DL
GLUCOSE BLD-MCNC: 130 MG/DL (ref 65–99)
HCT VFR BLD AUTO: 30.5 % (ref 37.5–51)
HGB BLD-MCNC: 11.1 G/DL (ref 13–17.7)
IMM GRANULOCYTES # BLD AUTO: 0.01 10*3/MM3 (ref 0–0.05)
IMM GRANULOCYTES NFR BLD AUTO: 0.2 % (ref 0–0.5)
LDH SERPL-CCNC: 381 U/L (ref 135–225)
LYMPHOCYTES # BLD AUTO: 0.71 10*3/MM3 (ref 0.7–3.1)
LYMPHOCYTES NFR BLD AUTO: 17.2 % (ref 19.6–45.3)
MAGNESIUM SERPL-MCNC: 1.7 MG/DL (ref 1.6–2.4)
MCH RBC QN AUTO: 33.5 PG (ref 26.6–33)
MCHC RBC AUTO-ENTMCNC: 36.4 G/DL (ref 31.5–35.7)
MCV RBC AUTO: 92.1 FL (ref 79–97)
MONOCYTES # BLD AUTO: 0.42 10*3/MM3 (ref 0.1–0.9)
MONOCYTES NFR BLD AUTO: 10.2 % (ref 5–12)
NEUTROPHILS # BLD AUTO: 2.96 10*3/MM3 (ref 1.7–7)
NEUTROPHILS NFR BLD AUTO: 71.7 % (ref 42.7–76)
NRBC BLD AUTO-RTO: 0 /100 WBC (ref 0–0.2)
PLATELET # BLD AUTO: 312 10*3/MM3 (ref 140–450)
PMV BLD AUTO: 9.1 FL (ref 6–12)
POTASSIUM BLD-SCNC: 3.8 MMOL/L (ref 3.5–5.2)
PROT SERPL-MCNC: 6.8 G/DL (ref 6–8.5)
RBC # BLD AUTO: 3.31 10*6/MM3 (ref 4.14–5.8)
SODIUM BLD-SCNC: 135 MMOL/L (ref 136–145)
WBC NRBC COR # BLD: 4.13 10*3/MM3 (ref 3.4–10.8)

## 2020-06-29 PROCEDURE — 82550 ASSAY OF CK (CPK): CPT | Performed by: INTERNAL MEDICINE

## 2020-06-29 PROCEDURE — 80053 COMPREHEN METABOLIC PANEL: CPT | Performed by: INTERNAL MEDICINE

## 2020-06-29 PROCEDURE — 85379 FIBRIN DEGRADATION QUANT: CPT | Performed by: INTERNAL MEDICINE

## 2020-06-29 PROCEDURE — 83735 ASSAY OF MAGNESIUM: CPT | Performed by: INTERNAL MEDICINE

## 2020-06-29 PROCEDURE — 85025 COMPLETE CBC W/AUTO DIFF WBC: CPT | Performed by: INTERNAL MEDICINE

## 2020-06-29 PROCEDURE — 82728 ASSAY OF FERRITIN: CPT | Performed by: INTERNAL MEDICINE

## 2020-06-29 PROCEDURE — 83615 LACTATE (LD) (LDH) ENZYME: CPT | Performed by: INTERNAL MEDICINE

## 2020-06-29 PROCEDURE — 85384 FIBRINOGEN ACTIVITY: CPT | Performed by: INTERNAL MEDICINE

## 2020-06-29 PROCEDURE — 86140 C-REACTIVE PROTEIN: CPT | Performed by: INTERNAL MEDICINE

## 2020-06-29 RX ORDER — ACETAMINOPHEN 500 MG
500 TABLET ORAL EVERY 6 HOURS PRN
Qty: 100 TABLET | Refills: 0 | Status: SHIPPED | OUTPATIENT
Start: 2020-06-29

## 2020-06-29 RX ADMIN — PANTOPRAZOLE SODIUM 40 MG: 40 TABLET, DELAYED RELEASE ORAL at 06:37

## 2020-06-29 RX ADMIN — LOSARTAN POTASSIUM: 50 TABLET, FILM COATED ORAL at 08:31

## 2020-06-29 RX ADMIN — ATORVASTATIN CALCIUM 40 MG: 20 TABLET, FILM COATED ORAL at 08:32

## 2020-06-29 RX ADMIN — SODIUM CHLORIDE, PRESERVATIVE FREE 10 ML: 5 INJECTION INTRAVENOUS at 08:33

## 2020-06-29 RX ADMIN — PREGABALIN 50 MG: 50 CAPSULE ORAL at 08:31

## 2020-06-29 RX ADMIN — CLOPIDOGREL 75 MG: 75 TABLET, FILM COATED ORAL at 08:32

## 2020-06-29 RX ADMIN — HYDROCODONE BITARTRATE AND ACETAMINOPHEN 1 TABLET: 5; 325 TABLET ORAL at 06:37

## 2020-06-30 ENCOUNTER — READMISSION MANAGEMENT (OUTPATIENT)
Dept: CALL CENTER | Facility: HOSPITAL | Age: 80
End: 2020-06-30

## 2020-06-30 NOTE — OUTREACH NOTE
COPD/PN Week 1 Survey      Responses   [facility] [place] by [facility] patient discharged from?  [place]   COVID-19 Test Status  Confirmed   Does the patient have one of the following disease processes/diagnoses(primary or secondary)?  COPD/Pneumonia   Is there a successful TCM telephone encounter documented?  No   Was the primary reason for admission:  Pneumonia   Week 1 attempt successful?  Yes   Call start time  1055   Call end time  1103   Discharge diagnosis  PNA & cytokine storm d/t COVID-19, KARLO   Is patient permission given to speak with other caregiver?  Yes   Meds reviewed with patient/caregiver?  Yes   Is the patient having any side effects they believe may be caused by any medication additions or changes?  No   Does the patient have all medications ordered at discharge?  Yes   Is the patient taking all medications as directed (includes completed medication regime)?  Yes   Does the patient have a primary care provider?   Yes   Does the patient have an appointment with their PCP or pulmonologist within 7 days of discharge?  No   Comments regarding PCP  PCP Mg Clark.    Nursing Interventions  Educated patient on importance of making appointment, Advised patient to make appointment [Advised to scheduled one week telemedicine appt. ]   Has the patient kept scheduled appointments due by today?  N/A   Has home health visited the patient within 72 hours of discharge?  N/A   Pulse Ox monitoring  None   Psychosocial issues?  No   Comments  Wife reports patient without fever since discharge. Denies increased shortness of breath. Wife unsure if patient has had any diarrhea since discharge.   Did the patient receive a copy of their discharge instructions?  Yes   Nursing interventions  Reviewed instructions with patient [spouse]   What is the patient's perception of their health status since discharge?  New symptoms unrelated to diagnosis [Wife reports patient having increased knee pain. Advised to observe for increased  swelling, area of redness, increased heat, worsening pain. Advised to contact PCP regarding knee pain issue. ]   Nursing Interventions  Nurse provided patient education, Advised patient to call provider [Also advised to try ice to knee for 20 min at a time with layer of cloth between ice pack and skin. ]   Is the patient/caregiver able to teach back the hierarchy of who to call/visit for symptoms/problems? PCP, Specialist, Home health nurse, Urgent Care, ED, 911  Yes   Is the patient/caregiver able to teach back signs and symptoms of worsening condition:  Fever/chills, Shortness of breath, Chest pain   Is the patient/caregiver able to teach back importance of completing antibiotic course of treatment?  -- [Patient did not go home on antibiotics. ]   Week 1 call completed?  Yes   Wrap up additional comments  Patient and spouse on home isolation. Spouse reports that she is also COVID positive.           Omaira Ward RN

## 2020-06-30 NOTE — OUTREACH NOTE
Prep Survey      Responses   [facility] from?  [place]   Is LACE score < 7 ?  No   Eligibility  Readm Mgmt   Discharge diagnosis  PNA & cytokine storm d/t COVID-19, KARLO   COVID-19 Test Status  Confirmed   Does the patient have one of the following disease processes/diagnoses(primary or secondary)?  COPD/Pneumonia   Does the patient have Home health ordered?  No   Is there a DME ordered?  No   Prep survey completed?  Yes          Amelia Hill RN

## 2020-07-01 ENCOUNTER — READMISSION MANAGEMENT (OUTPATIENT)
Dept: CALL CENTER | Facility: HOSPITAL | Age: 80
End: 2020-07-01

## 2020-07-01 LAB
BACTERIA SPEC AEROBE CULT: NORMAL
BACTERIA SPEC AEROBE CULT: NORMAL

## 2020-07-01 NOTE — OUTREACH NOTE
COPD/PN Week 1 Survey      Responses   [facility] patient discharged from?  [place]   COVID-19 Test Status  Confirmed   Does the patient have one of the following disease processes/diagnoses(primary or secondary)?  COPD/Pneumonia   Is there a successful TCM telephone encounter documented?  No   Was the primary reason for admission:  Pneumonia   Week 1 attempt successful?  No          Bertha Garrett LPN

## 2020-07-02 ENCOUNTER — READMISSION MANAGEMENT (OUTPATIENT)
Dept: CALL CENTER | Facility: HOSPITAL | Age: 80
End: 2020-07-02

## 2020-07-02 NOTE — OUTREACH NOTE
COPD/PN Week 1 Survey      Responses   [facility] patient discharged from?  [place]   COVID-19 Test Status  Confirmed   Does the patient have one of the following disease processes/diagnoses(primary or secondary)?  COPD/Pneumonia   Is there a successful TCM telephone encounter documented?  No   Was the primary reason for admission:  Pneumonia   Week 1 attempt successful?  Yes   Call start time  1526   Call end time  1532   Discharge diagnosis  PNA & cytokine storm d/t COVID-19, KARLO   Is patient permission given to speak with other caregiver?  Yes   List who call center can speak with  wife, Candace Elkins reviewed with patient/caregiver?  Yes   Is the patient taking all medications as directed (includes completed medication regime)?  Yes   Does the patient have a primary care provider?   Yes   Comments regarding PCP  PCP Mg Clark.    Has the patient kept scheduled appointments due by today?  Yes [Patient state that he has talked to his Dr. ]   Has home health visited the patient within 72 hours of discharge?  N/A   Pulse Ox monitoring  None   Psychosocial issues?  No   Did the patient receive a copy of their discharge instructions?  Yes   What is the patient's perception of their health status since discharge?  New symptoms unrelated to diagnosis [Patient continues to c/o knee pain. He states that he will try ice therapy today. Reinforced to only use ice pack for 20 min at a time to protect skin. ]   Nursing Interventions  Nurse provided patient education, Advised patient to call provider   Is the patient/caregiver able to teach back the hierarchy of who to call/visit for symptoms/problems? PCP, Specialist, Home health nurse, Urgent Care, ED, 911  Yes   Is the patient/caregiver able to teach back signs and symptoms of worsening condition:  Fever/chills, Shortness of breath, Chest pain [Patient denies fever, shortness of breath. ]   Week 1 call completed?  Yes   Wrap up additional comments  Patient and  spouse on home isolation. Spouse also COVID positive.           Omaira Ward RN

## 2020-07-05 ENCOUNTER — READMISSION MANAGEMENT (OUTPATIENT)
Dept: CALL CENTER | Facility: HOSPITAL | Age: 80
End: 2020-07-05

## 2020-07-05 NOTE — OUTREACH NOTE
COPD/PN Week 1 Survey      Responses   [facility] patient discharged from?  [place]   COVID-19 Test Status  Confirmed   Does the patient have one of the following disease processes/diagnoses(primary or secondary)?  COPD/Pneumonia   Is there a successful TCM telephone encounter documented?  No   Was the primary reason for admission:  Pneumonia   Week 1 attempt successful?  Yes   Call start time  1532   Call end time  1536   Discharge diagnosis  PNA & cytokine storm d/t COVID-19, KARLO   Meds reviewed with patient/caregiver?  Yes   Is the patient having any side effects they believe may be caused by any medication additions or changes?  No   Does the patient have all medications ordered at discharge?  Yes   Is the patient taking all medications as directed (includes completed medication regime)?  Yes   Does the patient have a primary care provider?   Yes   Has the patient kept scheduled appointments due by today?  Yes   Has home health visited the patient within 72 hours of discharge?  N/A   Pulse Ox monitoring  None   Psychosocial issues?  No   Did the patient receive a copy of their discharge instructions?  Yes   Nursing interventions  Reviewed instructions with patient   What is the patient's perception of their health status since discharge?  Improving   Nursing Interventions  Nurse provided patient education   Are the patient's immunizations up to date?   Yes   Nursing interventions  Educated on importance of maintaining up to date immunizations as advised by provider   Is the patient/caregiver able to teach back the hierarchy of who to call/visit for symptoms/problems? PCP, Specialist, Home health nurse, Urgent Care, ED, 911  Yes   Additional teach back comments  Afebrile, No SOA, no cough, got a new toothbrush, knee pain is moving down  his leg.  Both legs are improving, warm to touch, less painful, no red streaks.   Is the patient/caregiver able to teach back signs and symptoms of worsening condition:   Fever/chills, Shortness of breath, Chest pain   Is the patient/caregiver able to teach back importance of completing antibiotic course of treatment?  Yes   Week 1 call completed?  Yes   Wrap up additional comments  Still on isolation.          Terrie Samuel RN

## 2020-07-08 ENCOUNTER — READMISSION MANAGEMENT (OUTPATIENT)
Dept: CALL CENTER | Facility: HOSPITAL | Age: 80
End: 2020-07-08

## 2020-07-08 NOTE — OUTREACH NOTE
COPD/PN Week 1 Survey      Responses   [facility] patient discharged from?  [place]   COVID-19 Test Status  Confirmed   Does the patient have one of the following disease processes/diagnoses(primary or secondary)?  COPD/Pneumonia   Is there a successful TCM telephone encounter documented?  No   Was the primary reason for admission:  Pneumonia   Week 1 attempt successful?  Yes   Call start time  1223   Call end time  1235   Discharge diagnosis  PNA & cytokine storm d/t COVID-19, KARLO   Is patient permission given to speak with other caregiver?  Yes   List who call center can speak with  wife, Candace Elkins reviewed with patient/caregiver?  Yes   Is the patient having any side effects they believe may be caused by any medication additions or changes?  No   Does the patient have all medications ordered at discharge?  Yes   Is the patient taking all medications as directed (includes completed medication regime)?  Yes   Does the patient have a primary care provider?   Yes   Does the patient have an appointment with their PCP or pulmonologist within 7 days of discharge?  No   Comments regarding PCP  PCP Mg Clark.    Nursing Interventions  Educated patient on importance of making appointment, Advised patient to make appointment   Has the patient kept scheduled appointments due by today?  N/A   Has home health visited the patient within 72 hours of discharge?  N/A   Pulse Ox monitoring  None   Psychosocial issues?  No   Did the patient receive a copy of their discharge instructions?  Yes   Nursing interventions  Reviewed instructions with patient   What is the patient's perception of their health status since discharge?  Improving   Nursing Interventions  Nurse provided patient education   Is the patient/caregiver able to teach back the hierarchy of who to call/visit for symptoms/problems? PCP, Specialist, Home health nurse, Urgent Care, ED, 911  Yes   Additional teach back comments  Patient reports afebrile and  denies shortness of breath.    Is the patient/caregiver able to teach back signs and symptoms of worsening condition:  Fever/chills, Shortness of breath, Chest pain   Is the patient/caregiver able to teach back importance of completing antibiotic course of treatment?  Yes   Week 1 call completed?  Yes   Wrap up additional comments  Patient remains on post hospitalization 14 day home isolation.           Omaira Ward RN

## 2020-07-11 ENCOUNTER — READMISSION MANAGEMENT (OUTPATIENT)
Dept: CALL CENTER | Facility: HOSPITAL | Age: 80
End: 2020-07-11

## 2020-07-11 NOTE — OUTREACH NOTE
COPD/PN Week 2 Survey      Responses   [facility] patient discharged from?  [place]   COVID-19 Test Status  Confirmed   Does the patient have one of the following disease processes/diagnoses(primary or secondary)?  COPD/Pneumonia   Was the primary reason for admission:  Pneumonia   Week 2 attempt successful?  No          Steve Harden RN

## 2020-07-18 ENCOUNTER — READMISSION MANAGEMENT (OUTPATIENT)
Dept: CALL CENTER | Facility: HOSPITAL | Age: 80
End: 2020-07-18

## 2020-07-18 NOTE — OUTREACH NOTE
COPD/PN Week 3 Survey      Responses   [facility] patient discharged from?  [place]   COVID-19 Test Status  Confirmed   Does the patient have one of the following disease processes/diagnoses(primary or secondary)?  COPD/Pneumonia   Was the primary reason for admission:  Pneumonia   Week 3 attempt successful?  Yes   Call start time  1431   Call end time  1433   Discharge diagnosis  PNA & cytokine storm d/t COVID-19, KARLO   Meds reviewed with patient/caregiver?  Yes   Is the patient having any side effects they believe may be caused by any medication additions or changes?  No   Does the patient have all medications ordered at discharge?  Yes   Is the patient taking all medications as directed (includes completed medication regime)?  Yes   Does the patient have a primary care provider?   Yes   Does the patient have an appointment with their PCP or pulmonologist within 7 days of discharge?  Yes   Comments regarding PCP  PCP appt was on 7/6/2020   Has the patient kept scheduled appointments due by today?  Yes   Has home health visited the patient within 72 hours of discharge?  N/A   Pulse Ox monitoring  None   Psychosocial issues?  No   Did the patient receive a copy of their discharge instructions?  Yes   Nursing interventions  Reviewed instructions with patient   What is the patient's perception of their health status since discharge?  Improving   Nursing Interventions  Nurse provided patient education   Are the patient's immunizations up to date?   Yes   Is the patient/caregiver able to teach back the hierarchy of who to call/visit for symptoms/problems? PCP, Specialist, Home health nurse, Urgent Care, ED, 911  Yes   Additional teach back comments  Patient reports afebrile and denies shortness of breath.    Is the patient/caregiver able to teach back signs and symptoms of worsening condition:  Fever/chills, Shortness of breath, Chest pain   Is the patient/caregiver able to teach back importance of completing  antibiotic course of treatment?  -- [No antibiotics prescribed.]   Week 3 call completed?  Yes          Steve Harden RN

## 2020-07-25 ENCOUNTER — READMISSION MANAGEMENT (OUTPATIENT)
Dept: CALL CENTER | Facility: HOSPITAL | Age: 80
End: 2020-07-25

## 2020-07-25 NOTE — OUTREACH NOTE
"COPD/PN Week 4 Survey      Responses   [facility] patient discharged from?  [place]   COVID-19 Test Status  Confirmed   Does the patient have one of the following disease processes/diagnoses(primary or secondary)?  COPD/Pneumonia   Was the primary reason for admission:  Pneumonia   Week 4 attempt successful?  Yes   Call start time  1436   Call end time  1437   Discharge diagnosis  PNA & cytokine storm d/t COVID-19, KARLO   Meds reviewed with patient/caregiver?  Yes   Is the patient having any side effects they believe may be caused by any medication additions or changes?  No   Is the patient taking all medications as directed (includes completed medication regime)?  Yes   Has the patient kept scheduled appointments due by today?  N/A   Nursing Interventions  Advised patient to keep appointment, Educated on importance of keeping appointment   Comments  PCP appt \"next week.    Pulse Ox monitoring  None   Psychosocial issues?  No   What is the patient's perception of their health status since discharge?  Returned to baseline/stable   Is the patient/caregiver able to teach back the hierarchy of who to call/visit for symptoms/problems? PCP, Specialist, Home health nurse, Urgent Care, ED, 911  Yes   Additional teach back comments  Patient reports afebrile and denies shortness of breath.    Is the patient/caregiver able to teach back signs and symptoms of worsening condition:  Fever/chills, Shortness of breath, Chest pain   Week 4 call completed?  Yes   Would the patient like one additional call?  No   Graduated  Yes          Steve Harden RN  "

## 2020-09-21 ENCOUNTER — APPOINTMENT (OUTPATIENT)
Dept: LAB | Facility: HOSPITAL | Age: 80
End: 2020-09-21

## 2020-09-22 ENCOUNTER — APPOINTMENT (OUTPATIENT)
Dept: LAB | Facility: HOSPITAL | Age: 80
End: 2020-09-22

## 2020-09-28 ENCOUNTER — APPOINTMENT (OUTPATIENT)
Dept: LAB | Facility: HOSPITAL | Age: 80
End: 2020-09-28

## 2020-09-30 ENCOUNTER — LAB (OUTPATIENT)
Dept: LAB | Facility: HOSPITAL | Age: 80
End: 2020-09-30

## 2020-09-30 DIAGNOSIS — D63.1 ANEMIA IN STAGE 3 CHRONIC KIDNEY DISEASE (HCC): ICD-10-CM

## 2020-09-30 DIAGNOSIS — D47.2 SMOLDERING MYELOMA: ICD-10-CM

## 2020-09-30 DIAGNOSIS — N18.30 ANEMIA IN STAGE 3 CHRONIC KIDNEY DISEASE (HCC): ICD-10-CM

## 2020-09-30 LAB
ALBUMIN SERPL-MCNC: 4.1 G/DL (ref 3.5–5.2)
ALBUMIN/GLOB SERPL: 1.2 G/DL (ref 1.1–2.4)
ALP SERPL-CCNC: 55 U/L (ref 38–116)
ALT SERPL W P-5'-P-CCNC: 27 U/L (ref 0–41)
ANION GAP SERPL CALCULATED.3IONS-SCNC: 10.5 MMOL/L (ref 5–15)
AST SERPL-CCNC: 32 U/L (ref 0–40)
BASOPHILS # BLD AUTO: 0.02 10*3/MM3 (ref 0–0.2)
BASOPHILS NFR BLD AUTO: 0.5 % (ref 0–1.5)
BILIRUB SERPL-MCNC: 0.6 MG/DL (ref 0.2–1.2)
BUN SERPL-MCNC: 15 MG/DL (ref 6–20)
BUN/CREAT SERPL: 14.6 (ref 7.3–30)
CALCIUM SPEC-SCNC: 9.2 MG/DL (ref 8.5–10.2)
CHLORIDE SERPL-SCNC: 104 MMOL/L (ref 98–107)
CO2 SERPL-SCNC: 23.5 MMOL/L (ref 22–29)
CREAT SERPL-MCNC: 1.03 MG/DL (ref 0.7–1.3)
DEPRECATED RDW RBC AUTO: 46.7 FL (ref 37–54)
EOSINOPHIL # BLD AUTO: 0.11 10*3/MM3 (ref 0–0.4)
EOSINOPHIL NFR BLD AUTO: 2.7 % (ref 0.3–6.2)
ERYTHROCYTE [DISTWIDTH] IN BLOOD BY AUTOMATED COUNT: 12.8 % (ref 12.3–15.4)
GFR SERPL CREATININE-BSD FRML MDRD: 84 ML/MIN/1.73
GLOBULIN UR ELPH-MCNC: 3.3 GM/DL (ref 1.8–3.5)
GLUCOSE SERPL-MCNC: 122 MG/DL (ref 74–124)
HCT VFR BLD AUTO: 32.7 % (ref 37.5–51)
HGB BLD-MCNC: 11.2 G/DL (ref 13–17.7)
IMM GRANULOCYTES # BLD AUTO: 0.01 10*3/MM3 (ref 0–0.05)
IMM GRANULOCYTES NFR BLD AUTO: 0.2 % (ref 0–0.5)
LYMPHOCYTES # BLD AUTO: 1.11 10*3/MM3 (ref 0.7–3.1)
LYMPHOCYTES NFR BLD AUTO: 26.7 % (ref 19.6–45.3)
MCH RBC QN AUTO: 34.3 PG (ref 26.6–33)
MCHC RBC AUTO-ENTMCNC: 34.3 G/DL (ref 31.5–35.7)
MCV RBC AUTO: 100 FL (ref 79–97)
MONOCYTES # BLD AUTO: 0.45 10*3/MM3 (ref 0.1–0.9)
MONOCYTES NFR BLD AUTO: 10.8 % (ref 5–12)
NEUTROPHILS NFR BLD AUTO: 2.45 10*3/MM3 (ref 1.7–7)
NEUTROPHILS NFR BLD AUTO: 59.1 % (ref 42.7–76)
NRBC BLD AUTO-RTO: 0 /100 WBC (ref 0–0.2)
PLATELET # BLD AUTO: 174 10*3/MM3 (ref 140–450)
PMV BLD AUTO: 9.4 FL (ref 6–12)
POTASSIUM SERPL-SCNC: 4 MMOL/L (ref 3.5–4.7)
PROT SERPL-MCNC: 7.4 G/DL (ref 6.3–8)
RBC # BLD AUTO: 3.27 10*6/MM3 (ref 4.14–5.8)
SODIUM SERPL-SCNC: 138 MMOL/L (ref 134–145)
WBC # BLD AUTO: 4.15 10*3/MM3 (ref 3.4–10.8)

## 2020-09-30 PROCEDURE — 36415 COLL VENOUS BLD VENIPUNCTURE: CPT

## 2020-09-30 PROCEDURE — 85025 COMPLETE CBC W/AUTO DIFF WBC: CPT

## 2020-09-30 PROCEDURE — 80053 COMPREHEN METABOLIC PANEL: CPT

## 2020-10-01 LAB
ALBUMIN SERPL ELPH-MCNC: 3.6 G/DL (ref 2.9–4.4)
ALBUMIN/GLOB SERPL: 1.1 {RATIO} (ref 0.7–1.7)
ALPHA1 GLOB SERPL ELPH-MCNC: 0.2 G/DL (ref 0–0.4)
ALPHA2 GLOB SERPL ELPH-MCNC: 0.6 G/DL (ref 0.4–1)
B-GLOBULIN SERPL ELPH-MCNC: 0.8 G/DL (ref 0.7–1.3)
GAMMA GLOB SERPL ELPH-MCNC: 1.7 G/DL (ref 0.4–1.8)
GLOBULIN SER CALC-MCNC: 3.4 G/DL (ref 2.2–3.9)
IGA SERPL-MCNC: 28 MG/DL (ref 61–437)
IGG SERPL-MCNC: 1942 MG/DL (ref 603–1613)
IGM SERPL-MCNC: 48 MG/DL (ref 15–143)
KAPPA LC FREE SER-MCNC: 47.4 MG/L (ref 3.3–19.4)
KAPPA LC FREE/LAMBDA FREE SER: 6.32 {RATIO} (ref 0.26–1.65)
LABORATORY COMMENT REPORT: ABNORMAL
LAMBDA LC FREE SERPL-MCNC: 7.5 MG/L (ref 5.7–26.3)
M PROTEIN SERPL ELPH-MCNC: 1.6 G/DL
PROT PATTERN SERPL IFE-IMP: ABNORMAL
PROT SERPL-MCNC: 7 G/DL (ref 6–8.5)

## 2020-10-05 ENCOUNTER — APPOINTMENT (OUTPATIENT)
Dept: LAB | Facility: HOSPITAL | Age: 80
End: 2020-10-05

## 2020-10-05 ENCOUNTER — OFFICE VISIT (OUTPATIENT)
Dept: ONCOLOGY | Facility: CLINIC | Age: 80
End: 2020-10-05

## 2020-10-05 VITALS
TEMPERATURE: 97.8 F | HEIGHT: 71 IN | SYSTOLIC BLOOD PRESSURE: 141 MMHG | HEART RATE: 71 BPM | WEIGHT: 186.3 LBS | RESPIRATION RATE: 14 BRPM | BODY MASS INDEX: 26.08 KG/M2 | DIASTOLIC BLOOD PRESSURE: 68 MMHG | OXYGEN SATURATION: 98 %

## 2020-10-05 DIAGNOSIS — D47.2 SMOLDERING MYELOMA: Primary | ICD-10-CM

## 2020-10-05 DIAGNOSIS — N18.31 ANEMIA IN STAGE 3A CHRONIC KIDNEY DISEASE (HCC): ICD-10-CM

## 2020-10-05 DIAGNOSIS — D63.1 ANEMIA IN STAGE 3A CHRONIC KIDNEY DISEASE (HCC): ICD-10-CM

## 2020-10-05 PROCEDURE — 99213 OFFICE O/P EST LOW 20 MIN: CPT | Performed by: INTERNAL MEDICINE

## 2020-10-05 NOTE — PROGRESS NOTES
Subjective     REASON FOR FOLLOW-UP:  Chronic leukopenia, smoldering myeloma, anemia                               REQUESTING PHYSICIAN:  Eduardo    RECORDS OBTAINED:  Records of the patients history including those obtained from the referring provider were reviewed and summarized in detail.      History of Present Illness   This is a very pleasant 80-year-old man returning today for follow-up of smoldering myeloma.  He returned today feeling well.  He was admitted to the hospital in June with COVID-19 but appears to have recovered in full.  He denies shortness of breath, cough, fevers or increase/abnormal pain.      Past Medical History:   Diagnosis Date   • Arthritis     hands   • CAD (coronary artery disease)    • COVID-19    • Diabetes (CMS/HCC)    • GERD (gastroesophageal reflux disease)    • H/O MGUS (monoclonal gammopathy of unknown significance)    • History of colon polyps    • History of herpes zoster 2007   • History of pneumonia    • Hyperlipidemia    • Hypertension    • Neutropenia (CMS/HCC)     H/O hereditary neutropenia   • Prostate cancer (CMS/HCC) 2007   • Stroke (CMS/HCC) 1997        Past Surgical History:   Procedure Laterality Date   • ANAL FISSURECTOMY     • CATARACT EXTRACTION Bilateral 2009   • COLONOSCOPY  06/2014    cecal polyp, sigmoid diverticulosis   • ENDOSCOPY  2001   • OTHER SURGICAL HISTORY  2007    Decortication and right lower lobe with resection   • PROSTATE BIOPSY  2007   • THORACOSCOPY          Current Outpatient Medications on File Prior to Visit   Medication Sig Dispense Refill   • acetaminophen (TYLENOL) 500 MG tablet Take 1 tablet by mouth Every 6 (Six) Hours As Needed for Mild Pain . 100 tablet 0   • atorvastatin (LIPITOR) 40 MG tablet Daily.     • benzonatate (TESSALON) 100 MG capsule benzonatate 100 mg capsule   Take 1 capsule 3 times a day by oral route as needed.     • clopidogrel (PLAVIX) 75 MG tablet Take 75 mg by mouth.     • cyclobenzaprine (FLEXERIL) 10 MG tablet  Take 1 tablet by mouth 3 (Three) Times a Day As Needed for Muscle Spasms. 30 tablet 0   • diclofenac (VOLTAREN) 1 % gel gel Voltaren 1 % topical gel     • esomeprazole (nexIUM) 40 MG capsule Take 40 mg by mouth Every Morning Before Breakfast.     • HYDROcodone-acetaminophen (NORCO) 5-325 MG per tablet Take 1 tablet by mouth Every 6 (Six) Hours As Needed.     • irbesartan-hydrochlorothiazide (AVALIDE) 300-12.5 MG tablet      • Multiple Vitamins-Minerals (MULTIVITAMIN ADULTS 50+ PO) Take  by mouth Daily.     • pregabalin (LYRICA) 50 MG capsule Take 50 mg by mouth Daily.     • sildenafil (REVATIO) 20 MG tablet TK 2 TS PO QD PRN  3     No current facility-administered medications on file prior to visit.         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 The patient blows a trumpet and enjoys vacationing at [place]. 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        Family History   Problem Relation Age of Onset   • Uterine cancer Mother    • Hypertension Other    • Hyperlipidemia Other    • Arthritis Other    • Stroke Other    • Heart disease Sister    • Hypertension Brother         Review of Systems   Constitutional: Negative.    HENT: Negative.    Eyes: Negative.    Respiratory: Negative.    Cardiovascular: Negative.    Gastrointestinal: Negative.    Genitourinary: Negative.    Musculoskeletal: Positive for arthralgias.   Skin: Negative.    Allergic/Immunologic: Negative.    Neurological: Negative.   "  Hematological: Negative.    Psychiatric/Behavioral: Negative.     ROS unchanged- 10/5/2020      Objective     Vitals:    10/05/20 1620   BP: 141/68   Pulse: 71   Resp: 14   Temp: 97.8 °F (36.6 °C)   TempSrc: Tympanic   SpO2: 98%   Weight: 84.5 kg (186 lb 4.8 oz)   Height: 180.3 cm (70.98\")   PainSc: 0-No pain     Current Status 10/5/2020   ECOG score 0       Physical Exam    Con: pleasant, well-appearing man  HEENT: no icterus, no thrush, moist membranes  CV: RRR, S1S2  RESP: CTA bilat, no wheezing  GI: soft, nontender, no splenomegaly, +BS  MS: no edema, left knee in a brace  SKIN: no petechiae or bruising  NEURO: alert and oriented x3, normal strength, normal muscle tone  PSYCH: normal mood  Exam unchanged-10/5/2020      RECENT LABS:  Hematology WBC   Date Value Ref Range Status   2020 4.15 3.40 - 10.80 10*3/mm3 Final     RBC   Date Value Ref Range Status   2020 3.27 (L) 4.14 - 5.80 10*6/mm3 Final     Hemoglobin   Date Value Ref Range Status   2020 11.2 (L) 13.0 - 17.7 g/dL Final     Hematocrit   Date Value Ref Range Status   2020 32.7 (L) 37.5 - 51.0 % Final     Platelets   Date Value Ref Range Status   2020 174 140 - 450 10*3/mm3 Final        Lab Results   Component Value Date    GLUCOSE 122 2020    BUN 15 2020    CREATININE 1.03 2020    EGFRIFAFRI 84 2020    BCR 14.6 2020    K 4.0 2020    CO2 23.5 2020    CALCIUM 9.2 2020    PROTENTOTREF 7.0 2020    ALBUMIN 4.10 2020    ALBUMIN 3.6 2020    LABIL2 1.1 2020    AST 32 2020    ALT 27 2020     Peripheral blood flow cytometry 3/12/18: No abnormal myeloid or lymphoid populations identified  B2M.8  FLC ratio 3.96-->4.55->5.64-->10.05-->6.32  M-spike 1.4 g/dL IgG kappa-->1.4 g/dL-->1.2-->1.5-->1.6    Skeletal survey 3/12/18:  Negative.        Assessment/Plan     1.  Chronic leukopenia: The patient's ANC has run borderline low for a number of years.  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      Bone marrow exam was performed 1/11/19.  The bone marrow was mildly hypercellular 40% with involvement of a plasma cell dyscrasia 8% by aspirate and 15% by core biopsy.  Plasma cells were monoclonal kappa by flow cytometry.  There was trilineage hematopoiesis.  [place] red stain was negative for amyloid deposition.  Cytogenetics were normal; complete FISH panel could not be performed.  No dyspoiesis of the white cells noted.    4.  Smoldering myeloma:  He does have increased number of plasma cells by bone marrow biopsy 8% aspirate/15% core biopsy monoclonal kappa.  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  CBC and CMP reviewed today are stable.  Electrophoresis shows stable M spike 1.6 g/dL.  The light chain ratio is stable 6.32  Given lack of endorgan damage we will continue observation with repeat labs to be done in 4 months.    3.  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  Iron studies, B12, folic acid without obvious deficiency.  Hemoglobin currently stable 11.2    Follow-up in 4 months with CBC, CMP, protein electrophoresis/immunofixation and light chain ratio ordered

## 2021-01-25 ENCOUNTER — APPOINTMENT (OUTPATIENT)
Dept: LAB | Facility: HOSPITAL | Age: 81
End: 2021-01-25

## 2021-01-28 ENCOUNTER — APPOINTMENT (OUTPATIENT)
Dept: LAB | Facility: HOSPITAL | Age: 81
End: 2021-01-28

## 2021-02-12 ENCOUNTER — OFFICE VISIT (OUTPATIENT)
Dept: ONCOLOGY | Facility: CLINIC | Age: 81
End: 2021-02-12

## 2021-02-12 ENCOUNTER — LAB (OUTPATIENT)
Dept: LAB | Facility: HOSPITAL | Age: 81
End: 2021-02-12

## 2021-02-12 VITALS
BODY MASS INDEX: 23.31 KG/M2 | WEIGHT: 191.4 LBS | HEIGHT: 76 IN | SYSTOLIC BLOOD PRESSURE: 153 MMHG | OXYGEN SATURATION: 98 % | RESPIRATION RATE: 16 BRPM | TEMPERATURE: 98.6 F | HEART RATE: 64 BPM | DIASTOLIC BLOOD PRESSURE: 75 MMHG

## 2021-02-12 DIAGNOSIS — D63.1 ANEMIA IN STAGE 3A CHRONIC KIDNEY DISEASE (HCC): ICD-10-CM

## 2021-02-12 DIAGNOSIS — N18.31 ANEMIA IN STAGE 3A CHRONIC KIDNEY DISEASE (HCC): ICD-10-CM

## 2021-02-12 DIAGNOSIS — D47.2 SMOLDERING MYELOMA: ICD-10-CM

## 2021-02-12 DIAGNOSIS — D70.8 OTHER NEUTROPENIA (HCC): Primary | ICD-10-CM

## 2021-02-12 LAB
ALBUMIN SERPL-MCNC: 4.1 G/DL (ref 3.5–5.2)
ALBUMIN/GLOB SERPL: 1.4 G/DL (ref 1.1–2.4)
ALP SERPL-CCNC: 59 U/L (ref 38–116)
ALT SERPL W P-5'-P-CCNC: 30 U/L (ref 0–41)
ANION GAP SERPL CALCULATED.3IONS-SCNC: 8.7 MMOL/L (ref 5–15)
AST SERPL-CCNC: 28 U/L (ref 0–40)
BASOPHILS # BLD AUTO: 0.01 10*3/MM3 (ref 0–0.2)
BASOPHILS NFR BLD AUTO: 0.3 % (ref 0–1.5)
BILIRUB SERPL-MCNC: 0.5 MG/DL (ref 0.2–1.2)
BUN SERPL-MCNC: 15 MG/DL (ref 6–20)
BUN/CREAT SERPL: 14.2 (ref 7.3–30)
CALCIUM SPEC-SCNC: 9.2 MG/DL (ref 8.5–10.2)
CHLORIDE SERPL-SCNC: 102 MMOL/L (ref 98–107)
CO2 SERPL-SCNC: 26.3 MMOL/L (ref 22–29)
CREAT SERPL-MCNC: 1.06 MG/DL (ref 0.7–1.3)
DEPRECATED RDW RBC AUTO: 44.7 FL (ref 37–54)
EOSINOPHIL # BLD AUTO: 0.09 10*3/MM3 (ref 0–0.4)
EOSINOPHIL NFR BLD AUTO: 3 % (ref 0.3–6.2)
ERYTHROCYTE [DISTWIDTH] IN BLOOD BY AUTOMATED COUNT: 12.6 % (ref 12.3–15.4)
GFR SERPL CREATININE-BSD FRML MDRD: 82 ML/MIN/1.73
GLOBULIN UR ELPH-MCNC: 2.9 GM/DL (ref 1.8–3.5)
GLUCOSE SERPL-MCNC: 106 MG/DL (ref 74–124)
HCT VFR BLD AUTO: 33.6 % (ref 37.5–51)
HGB BLD-MCNC: 11.5 G/DL (ref 13–17.7)
IMM GRANULOCYTES # BLD AUTO: 0 10*3/MM3 (ref 0–0.05)
IMM GRANULOCYTES NFR BLD AUTO: 0 % (ref 0–0.5)
LYMPHOCYTES # BLD AUTO: 1.49 10*3/MM3 (ref 0.7–3.1)
LYMPHOCYTES NFR BLD AUTO: 50.3 % (ref 19.6–45.3)
MCH RBC QN AUTO: 33.2 PG (ref 26.6–33)
MCHC RBC AUTO-ENTMCNC: 34.2 G/DL (ref 31.5–35.7)
MCV RBC AUTO: 97.1 FL (ref 79–97)
MONOCYTES # BLD AUTO: 0.27 10*3/MM3 (ref 0.1–0.9)
MONOCYTES NFR BLD AUTO: 9.1 % (ref 5–12)
NEUTROPHILS NFR BLD AUTO: 1.1 10*3/MM3 (ref 1.7–7)
NEUTROPHILS NFR BLD AUTO: 37.3 % (ref 42.7–76)
NRBC BLD AUTO-RTO: 0 /100 WBC (ref 0–0.2)
PLATELET # BLD AUTO: 173 10*3/MM3 (ref 140–450)
PMV BLD AUTO: 8.9 FL (ref 6–12)
POTASSIUM SERPL-SCNC: 3.6 MMOL/L (ref 3.5–4.7)
PROT SERPL-MCNC: 7 G/DL (ref 6.3–8)
RBC # BLD AUTO: 3.46 10*6/MM3 (ref 4.14–5.8)
SODIUM SERPL-SCNC: 137 MMOL/L (ref 134–145)
WBC # BLD AUTO: 2.96 10*3/MM3 (ref 3.4–10.8)

## 2021-02-12 PROCEDURE — 99213 OFFICE O/P EST LOW 20 MIN: CPT | Performed by: INTERNAL MEDICINE

## 2021-02-12 PROCEDURE — 85025 COMPLETE CBC W/AUTO DIFF WBC: CPT

## 2021-02-12 PROCEDURE — 80053 COMPREHEN METABOLIC PANEL: CPT

## 2021-02-12 PROCEDURE — 36415 COLL VENOUS BLD VENIPUNCTURE: CPT
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
## 2021-02-13 LAB
KAPPA LC FREE SER-MCNC: 40.4 MG/L (ref 3.3–19.4)
KAPPA LC FREE/LAMBDA FREE SER: 5.69 {RATIO} (ref 0.26–1.65)
LAMBDA LC FREE SERPL-MCNC: 7.1 MG/L (ref 5.7–26.3)

## 2021-02-15 LAB
ALBUMIN SERPL ELPH-MCNC: 4.1 G/DL (ref 2.9–4.4)
ALBUMIN/GLOB SERPL: 1.5 {RATIO} (ref 0.7–1.7)
ALPHA1 GLOB SERPL ELPH-MCNC: 0.2 G/DL (ref 0–0.4)
ALPHA2 GLOB SERPL ELPH-MCNC: 0.4 G/DL (ref 0.4–1)
B-GLOBULIN SERPL ELPH-MCNC: 0.7 G/DL (ref 0.7–1.3)
GAMMA GLOB SERPL ELPH-MCNC: 1.5 G/DL (ref 0.4–1.8)
GLOBULIN SER CALC-MCNC: 2.7 G/DL (ref 2.2–3.9)
IGA SERPL-MCNC: 25 MG/DL (ref 61–437)
IGG SERPL-MCNC: 1714 MG/DL (ref 603–1613)
IGM SERPL-MCNC: 36 MG/DL (ref 15–143)
LABORATORY COMMENT REPORT: ABNORMAL
M PROTEIN SERPL ELPH-MCNC: 1.2 G/DL
PROT PATTERN SERPL IFE-IMP: ABNORMAL
PROT SERPL-MCNC: 6.8 G/DL (ref 6–8.5)

## 2021-02-16 ENCOUNTER — TELEPHONE (OUTPATIENT)
Dept: ONCOLOGY | Facility: CLINIC | Age: 81
End: 2021-02-16

## 2021-02-16 NOTE — TELEPHONE ENCOUNTER
Called pt. Informed him of results and F/U as already scheduled in June. Pt V/U.     ----- Message from Josh Daigle MD sent at 2/16/2021  9:44 AM EST -----  Please let the patient know his protein levels returned stable from previous visits.  We will keep his follow-up as scheduled.  ----- Message -----  From: Lab, Background User  Sent: 2/12/2021   3:32 PM EST  To: Josh Daigle MD

## 2021-06-04 ENCOUNTER — OFFICE VISIT (OUTPATIENT)
Dept: ONCOLOGY | Facility: CLINIC | Age: 81
End: 2021-06-04

## 2021-06-04 ENCOUNTER — LAB (OUTPATIENT)
Dept: LAB | Facility: HOSPITAL | Age: 81
End: 2021-06-04

## 2021-06-04 ENCOUNTER — APPOINTMENT (OUTPATIENT)
Dept: LAB | Facility: HOSPITAL | Age: 81
End: 2021-06-04

## 2021-06-04 VITALS
WEIGHT: 192.6 LBS | RESPIRATION RATE: 14 BRPM | HEART RATE: 69 BPM | DIASTOLIC BLOOD PRESSURE: 71 MMHG | TEMPERATURE: 98.4 F | HEIGHT: 69 IN | OXYGEN SATURATION: 96 % | SYSTOLIC BLOOD PRESSURE: 131 MMHG | BODY MASS INDEX: 28.53 KG/M2

## 2021-06-04 DIAGNOSIS — D63.1 ANEMIA IN STAGE 3A CHRONIC KIDNEY DISEASE (HCC): ICD-10-CM

## 2021-06-04 DIAGNOSIS — D47.2 SMOLDERING MYELOMA: ICD-10-CM

## 2021-06-04 DIAGNOSIS — D47.2 SMOLDERING MYELOMA: Primary | ICD-10-CM

## 2021-06-04 DIAGNOSIS — D70.8 OTHER NEUTROPENIA (HCC): ICD-10-CM

## 2021-06-04 DIAGNOSIS — N18.31 ANEMIA IN STAGE 3A CHRONIC KIDNEY DISEASE (HCC): ICD-10-CM

## 2021-06-04 DIAGNOSIS — N28.9 MILD RENAL INSUFFICIENCY: ICD-10-CM

## 2021-06-04 LAB
ALBUMIN SERPL-MCNC: 4.1 G/DL (ref 3.5–5.2)
ALBUMIN/GLOB SERPL: 1.3 G/DL (ref 1.1–2.4)
ALP SERPL-CCNC: 57 U/L (ref 38–116)
ALT SERPL W P-5'-P-CCNC: 29 U/L (ref 0–41)
ANION GAP SERPL CALCULATED.3IONS-SCNC: 9.4 MMOL/L (ref 5–15)
AST SERPL-CCNC: 30 U/L (ref 0–40)
BASOPHILS # BLD AUTO: 0.01 10*3/MM3 (ref 0–0.2)
BASOPHILS NFR BLD AUTO: 0.3 % (ref 0–1.5)
BILIRUB SERPL-MCNC: 0.5 MG/DL (ref 0.2–1.2)
BUN SERPL-MCNC: 19 MG/DL (ref 6–20)
BUN/CREAT SERPL: 17.6 (ref 7.3–30)
CALCIUM SPEC-SCNC: 8.8 MG/DL (ref 8.5–10.2)
CHLORIDE SERPL-SCNC: 104 MMOL/L (ref 98–107)
CO2 SERPL-SCNC: 25.6 MMOL/L (ref 22–29)
CREAT SERPL-MCNC: 1.08 MG/DL (ref 0.7–1.3)
DEPRECATED RDW RBC AUTO: 43.7 FL (ref 37–54)
EOSINOPHIL # BLD AUTO: 0.05 10*3/MM3 (ref 0–0.4)
EOSINOPHIL NFR BLD AUTO: 1.7 % (ref 0.3–6.2)
ERYTHROCYTE [DISTWIDTH] IN BLOOD BY AUTOMATED COUNT: 12.3 % (ref 12.3–15.4)
GFR SERPL CREATININE-BSD FRML MDRD: 80 ML/MIN/1.73
GLOBULIN UR ELPH-MCNC: 3.2 GM/DL (ref 1.8–3.5)
GLUCOSE SERPL-MCNC: 107 MG/DL (ref 74–124)
HCT VFR BLD AUTO: 32.2 % (ref 37.5–51)
HGB BLD-MCNC: 11.3 G/DL (ref 13–17.7)
IMM GRANULOCYTES # BLD AUTO: 0 10*3/MM3 (ref 0–0.05)
IMM GRANULOCYTES NFR BLD AUTO: 0 % (ref 0–0.5)
LYMPHOCYTES # BLD AUTO: 1.41 10*3/MM3 (ref 0.7–3.1)
LYMPHOCYTES NFR BLD AUTO: 48.1 % (ref 19.6–45.3)
MCH RBC QN AUTO: 34.1 PG (ref 26.6–33)
MCHC RBC AUTO-ENTMCNC: 35.1 G/DL (ref 31.5–35.7)
MCV RBC AUTO: 97.3 FL (ref 79–97)
MONOCYTES # BLD AUTO: 0.34 10*3/MM3 (ref 0.1–0.9)
MONOCYTES NFR BLD AUTO: 11.6 % (ref 5–12)
NEUTROPHILS NFR BLD AUTO: 1.12 10*3/MM3 (ref 1.7–7)
NEUTROPHILS NFR BLD AUTO: 38.3 % (ref 42.7–76)
NRBC BLD AUTO-RTO: 0 /100 WBC (ref 0–0.2)
PLATELET # BLD AUTO: 142 10*3/MM3 (ref 140–450)
PMV BLD AUTO: 9.4 FL (ref 6–12)
POTASSIUM SERPL-SCNC: 3.7 MMOL/L (ref 3.5–4.7)
PROT SERPL-MCNC: 7.3 G/DL (ref 6.3–8)
RBC # BLD AUTO: 3.31 10*6/MM3 (ref 4.14–5.8)
SODIUM SERPL-SCNC: 139 MMOL/L (ref 134–145)
WBC # BLD AUTO: 2.93 10*3/MM3 (ref 3.4–10.8)

## 2021-06-04 PROCEDURE — 80053 COMPREHEN METABOLIC PANEL: CPT

## 2021-06-04 PROCEDURE — 99213 OFFICE O/P EST LOW 20 MIN: CPT | Performed by: INTERNAL MEDICINE

## 2021-06-04 PROCEDURE — 36415 COLL VENOUS BLD VENIPUNCTURE: CPT

## 2021-06-04 PROCEDURE — 85025 COMPLETE CBC W/AUTO DIFF WBC: CPT

## 2021-06-04 NOTE — PROGRESS NOTES
Subjective     REASON FOR FOLLOW-UP:  Chronic leukopenia, smoldering myeloma, anemia                               REQUESTING PHYSICIAN:  Eduardo    RECORDS OBTAINED:  Records of the patients history including those obtained from the referring provider were reviewed and summarized in detail.      History of Present Illness   This is a very pleasant 81-year-old man returning today for follow-up of smoldering myeloma.  The patient reports doing well with no significant shortness of breath, lightheadedness, dizziness, bleeding, bruising, fevers or chills.  He has arthralgias which are stable.      Past Medical History:   Diagnosis Date   • Arthritis     hands   • CAD (coronary artery disease)    • COVID-19    • Diabetes (CMS/HCC)    • GERD (gastroesophageal reflux disease)    • H/O MGUS (monoclonal gammopathy of unknown significance)    • History of colon polyps    • History of herpes zoster 2007   • History of pneumonia    • Hyperlipidemia    • Hypertension    • Neutropenia (CMS/HCC)     H/O hereditary neutropenia   • Prostate cancer (CMS/[facility]) 2007   • Stroke (CMS/[facility]) 1997        Past Surgical History:   Procedure Laterality Date   • ANAL FISSURECTOMY     • CATARACT EXTRACTION Bilateral 2009   • COLONOSCOPY  06/2014    cecal polyp, sigmoid diverticulosis   • ENDOSCOPY  2001   • OTHER SURGICAL HISTORY  2007    Decortication and right lower lobe with resection   • PROSTATE BIOPSY  2007   • THORACOSCOPY          Current Outpatient Medications on File Prior to Visit   Medication Sig Dispense Refill   • acetaminophen (TYLENOL) 500 MG tablet Take 1 tablet by mouth Every 6 (Six) Hours As Needed for Mild Pain . 100 tablet 0   • atorvastatin (LIPITOR) 40 MG tablet Daily.     • benzonatate (TESSALON) 100 MG capsule benzonatate 100 mg capsule   Take 1 capsule 3 times a day by oral route as needed.     • clopidogrel (PLAVIX) 75 MG tablet Take 75 mg by mouth.     • cyclobenzaprine (FLEXERIL) 10 MG tablet Take 1 tablet by mouth 3  "(Three) Times a Day As Needed for Muscle Spasms. 30 tablet 0   • diclofenac (VOLTAREN) 1 % gel gel Voltaren 1 % topical gel     • esomeprazole (nexIUM) 40 MG capsule Take 40 mg by mouth Every Morning Before Breakfast.     • HYDROcodone-acetaminophen (NORCO) 5-325 MG per tablet Take 1 tablet by mouth Every 6 (Six) Hours As Needed.     • irbesartan-hydrochlorothiazide (AVALIDE) 300-12.5 MG tablet      • Multiple Vitamins-Minerals (MULTIVITAMIN ADULTS 50+ PO) Take  by mouth Daily.     • pregabalin (LYRICA) 50 MG capsule Take 50 mg by mouth Daily.     • sildenafil (REVATIO) 20 MG tablet TK 2 TS PO QD PRN  3     No current facility-administered medications on file prior to visit.        ALLERGIES:  No Known Allergies     Social History     Socioeconomic History   • Marital status:      Spouse name: Candace   • Number of children: Not on file   • Years of education: High school   • Highest education level: Not on file   Tobacco Use   • Smoking status: Former Smoker   Substance and Sexual Activity   • Alcohol use: Yes     Comment: Occasionally   • Drug use: No        Family History   Problem Relation Age of Onset   • Uterine cancer Mother    • Hypertension Other    • Hyperlipidemia Other    • Arthritis Other    • Stroke Other    • Heart disease Sister    • Hypertension Brother         Review of Systems   Constitutional: Negative.    HENT: Negative.    Eyes: Negative.    Respiratory: Negative.    Cardiovascular: Negative.    Gastrointestinal: Negative.    Genitourinary: Negative.    Musculoskeletal: Positive for arthralgias.   Skin: Negative.    Allergic/Immunologic: Negative.    Neurological: Negative.    Hematological: Negative.    Psychiatric/Behavioral: Negative.     ROS unchanged-6/4/2021      Objective     Vitals:    06/04/21 1227   BP: 131/71   Pulse: 69   Resp: 14   Temp: 98.4 °F (36.9 °C)   TempSrc: Infrared   SpO2: 96%   Weight: 87.4 kg (192 lb 9.6 oz)   Height: 175 cm (68.9\")   PainSc: 0-No pain "     Current Status 2021   ECOG score 0       Physical Exam    Con: pleasant, well-appearing man  HEENT: no icterus, no thrush, moist membranes  CV: RRR, S1S2  RESP: CTA bilat, no wheezing  GI: soft, nontender, no splenomegaly, +BS  MS: no edema, left knee in a brace  SKIN: no petechiae or bruising  NEURO: alert and oriented x3, normal strength, normal muscle tone  PSYCH: normal mood  Exam unchanged-2021    RECENT LABS:  Hematology WBC   Date Value Ref Range Status   2021 2.93 (L) 3.40 - 10.80 10*3/mm3 Final     RBC   Date Value Ref Range Status   2021 3.31 (L) 4.14 - 5.80 10*6/mm3 Final     Hemoglobin   Date Value Ref Range Status   2021 11.3 (L) 13.0 - 17.7 g/dL Final     Hematocrit   Date Value Ref Range Status   2021 32.2 (L) 37.5 - 51.0 % Final     Platelets   Date Value Ref Range Status   2021 142 140 - 450 10*3/mm3 Final        Lab Results   Component Value Date    GLUCOSE 106 2021    BUN 15 2021    CREATININE 1.06 2021    EGFRIFAFRI 82 2021    BCR 14.2 2021    K 3.6 2021    CO2 26.3 2021    CALCIUM 9.2 2021    PROTENTOTREF 6.8 2021    ALBUMIN 4.10 2021    ALBUMIN 4.1 2021    LABIL2 1.5 2021    AST 28 2021    ALT 30 2021     Peripheral blood flow cytometry 3/12/18: No abnormal myeloid or lymphoid populations identified  B2M.8  FLC ratio 3.96-->4.55->5.64-->10.05-->6.32  M-spike 1.4 g/dL IgG kappa-->1.4 g/dL-->1.2-->1.5-->1.6    Skeletal survey 3/12/18:  Negative.        Assessment/Plan     1.  Chronic leukopenia: The patient's ANC has run borderline low for a number of years.  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      Bone marrow exam was performed 19.  The bone marrow was mildly hypercellular 40% with involvement of a plasma cell dyscrasia 8% by aspirate and 15% by core  biopsy.  Plasma cells were monoclonal kappa by flow cytometry.  There was trilineage hematopoiesis.  Congo red stain was negative for amyloid deposition.  Cytogenetics were normal; complete FISH panel could not be performed.  No dyspoiesis of the white cells noted.    4.  Smoldering myeloma:  He does have increased number of plasma cells by bone marrow biopsy 8% aspirate/15% core biopsy monoclonal kappa.  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  CBC and CMP reviewed today are stable.  SPEP/LILLY and a free light chain ratio are pending.  We will call the patient when results are available.  Assuming stability we will arrange him to have repeat CBC, CMP, SPEP/LILLY and a free light chain ratio again in 4 months.  The patient did not have his skeletal survey performed and we will ask him to reschedule.    3.  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  Iron studies, B12, folic acid without obvious deficiency.  Hemoglobin currently stable 11.3    Follow-up in 4 months with CBC, CMP, protein electrophoresis/immunofixation and light chain ratio ordered.  I asked him to reschedule his skeletal survey.

## 2021-06-05 LAB
KAPPA LC FREE SER-MCNC: 40.6 MG/L (ref 3.3–19.4)
KAPPA LC FREE/LAMBDA FREE SER: 9.44 {RATIO} (ref 0.26–1.65)
LAMBDA LC FREE SERPL-MCNC: 4.3 MG/L (ref 5.7–26.3)

## 2021-06-07 LAB
ALBUMIN SERPL ELPH-MCNC: 3.9 G/DL (ref 2.9–4.4)
ALBUMIN/GLOB SERPL: 1.3 {RATIO} (ref 0.7–1.7)
ALPHA1 GLOB SERPL ELPH-MCNC: 0.2 G/DL (ref 0–0.4)
ALPHA2 GLOB SERPL ELPH-MCNC: 0.5 G/DL (ref 0.4–1)
B-GLOBULIN SERPL ELPH-MCNC: 0.8 G/DL (ref 0.7–1.3)
GAMMA GLOB SERPL ELPH-MCNC: 1.6 G/DL (ref 0.4–1.8)
GLOBULIN SER CALC-MCNC: 3.1 G/DL (ref 2.2–3.9)
IGA SERPL-MCNC: 23 MG/DL (ref 61–437)
IGG SERPL-MCNC: 1805 MG/DL (ref 603–1613)
IGM SERPL-MCNC: 36 MG/DL (ref 15–143)
LABORATORY COMMENT REPORT: ABNORMAL
M PROTEIN SERPL ELPH-MCNC: 1.5 G/DL
PROT PATTERN SERPL IFE-IMP: ABNORMAL
PROT SERPL-MCNC: 7 G/DL (ref 6–8.5)

## 2021-06-08 ENCOUNTER — TELEPHONE (OUTPATIENT)
Dept: ONCOLOGY | Facility: CLINIC | Age: 81
End: 2021-06-08

## 2021-06-08 NOTE — TELEPHONE ENCOUNTER
Called pt. Informed him of lab results. Pt V/U.   ----- Message from Josh Daigle MD sent at 6/8/2021 12:21 PM EDT -----  Please let him know protein labs stable  ----- Message -----  From: Lab, Background User  Sent: 6/4/2021  12:30 PM EDT  To: Josh Daigle MD

## 2021-09-24 ENCOUNTER — APPOINTMENT (OUTPATIENT)
Dept: LAB | Facility: HOSPITAL | Age: 81
End: 2021-09-24

## 2021-09-29 ENCOUNTER — TELEPHONE (OUTPATIENT)
Dept: ONCOLOGY | Facility: OTHER | Age: 81
End: 2021-09-29

## 2021-09-29 ENCOUNTER — APPOINTMENT (OUTPATIENT)
Dept: LAB | Facility: HOSPITAL | Age: 81
End: 2021-09-29

## 2021-09-30 ENCOUNTER — LAB (OUTPATIENT)
Dept: LAB | Facility: HOSPITAL | Age: 81
End: 2021-09-30

## 2021-09-30 DIAGNOSIS — N18.31 ANEMIA IN STAGE 3A CHRONIC KIDNEY DISEASE (HCC): ICD-10-CM

## 2021-09-30 DIAGNOSIS — D47.2 SMOLDERING MYELOMA: ICD-10-CM

## 2021-09-30 DIAGNOSIS — N28.9 MILD RENAL INSUFFICIENCY: ICD-10-CM

## 2021-09-30 DIAGNOSIS — D63.1 ANEMIA IN STAGE 3A CHRONIC KIDNEY DISEASE (HCC): ICD-10-CM

## 2021-09-30 LAB
ALBUMIN SERPL-MCNC: 4 G/DL (ref 3.5–5.2)
ALBUMIN/GLOB SERPL: 1.3 G/DL (ref 1.1–2.4)
ALP SERPL-CCNC: 54 U/L (ref 38–116)
ALT SERPL W P-5'-P-CCNC: 26 U/L (ref 0–41)
ANION GAP SERPL CALCULATED.3IONS-SCNC: 9.6 MMOL/L (ref 5–15)
AST SERPL-CCNC: 29 U/L (ref 0–40)
BASOPHILS # BLD AUTO: 0.01 10*3/MM3 (ref 0–0.2)
BASOPHILS NFR BLD AUTO: 0.3 % (ref 0–1.5)
BILIRUB SERPL-MCNC: 0.6 MG/DL (ref 0.2–1.2)
BUN SERPL-MCNC: 14 MG/DL (ref 6–20)
BUN/CREAT SERPL: 11.9 (ref 7.3–30)
CALCIUM SPEC-SCNC: 9 MG/DL (ref 8.5–10.2)
CHLORIDE SERPL-SCNC: 103 MMOL/L (ref 98–107)
CO2 SERPL-SCNC: 24.4 MMOL/L (ref 22–29)
CREAT SERPL-MCNC: 1.18 MG/DL (ref 0.7–1.3)
DEPRECATED RDW RBC AUTO: 44.8 FL (ref 37–54)
EOSINOPHIL # BLD AUTO: 0.04 10*3/MM3 (ref 0–0.4)
EOSINOPHIL NFR BLD AUTO: 1.3 % (ref 0.3–6.2)
ERYTHROCYTE [DISTWIDTH] IN BLOOD BY AUTOMATED COUNT: 12.5 % (ref 12.3–15.4)
GFR SERPL CREATININE-BSD FRML MDRD: 72 ML/MIN/1.73
GLOBULIN UR ELPH-MCNC: 3.1 GM/DL (ref 1.8–3.5)
GLUCOSE SERPL-MCNC: 145 MG/DL (ref 74–124)
HCT VFR BLD AUTO: 33.4 % (ref 37.5–51)
HGB BLD-MCNC: 11.4 G/DL (ref 13–17.7)
IMM GRANULOCYTES # BLD AUTO: 0 10*3/MM3 (ref 0–0.05)
IMM GRANULOCYTES NFR BLD AUTO: 0 % (ref 0–0.5)
LYMPHOCYTES # BLD AUTO: 1.57 10*3/MM3 (ref 0.7–3.1)
LYMPHOCYTES NFR BLD AUTO: 51.6 % (ref 19.6–45.3)
MCH RBC QN AUTO: 33.3 PG (ref 26.6–33)
MCHC RBC AUTO-ENTMCNC: 34.1 G/DL (ref 31.5–35.7)
MCV RBC AUTO: 97.7 FL (ref 79–97)
MONOCYTES # BLD AUTO: 0.32 10*3/MM3 (ref 0.1–0.9)
MONOCYTES NFR BLD AUTO: 10.5 % (ref 5–12)
NEUTROPHILS NFR BLD AUTO: 1.1 10*3/MM3 (ref 1.7–7)
NEUTROPHILS NFR BLD AUTO: 36.3 % (ref 42.7–76)
NRBC BLD AUTO-RTO: 0 /100 WBC (ref 0–0.2)
PLATELET # BLD AUTO: 158 10*3/MM3 (ref 140–450)
PMV BLD AUTO: 9.2 FL (ref 6–12)
POTASSIUM SERPL-SCNC: 3.6 MMOL/L (ref 3.5–4.7)
PROT SERPL-MCNC: 7.1 G/DL (ref 6.3–8)
RBC # BLD AUTO: 3.42 10*6/MM3 (ref 4.14–5.8)
SODIUM SERPL-SCNC: 137 MMOL/L (ref 134–145)
WBC # BLD AUTO: 3.04 10*3/MM3 (ref 3.4–10.8)

## 2021-09-30 PROCEDURE — 36415 COLL VENOUS BLD VENIPUNCTURE: CPT

## 2021-09-30 PROCEDURE — 85025 COMPLETE CBC W/AUTO DIFF WBC: CPT

## 2021-09-30 PROCEDURE — 80053 COMPREHEN METABOLIC PANEL: CPT

## 2021-10-01 LAB
ALBUMIN SERPL ELPH-MCNC: 3.7 G/DL (ref 2.9–4.4)
ALBUMIN/GLOB SERPL: 1.1 {RATIO} (ref 0.7–1.7)
ALPHA1 GLOB SERPL ELPH-MCNC: 0.2 G/DL (ref 0–0.4)
ALPHA2 GLOB SERPL ELPH-MCNC: 0.4 G/DL (ref 0.4–1)
B-GLOBULIN SERPL ELPH-MCNC: 0.8 G/DL (ref 0.7–1.3)
GAMMA GLOB SERPL ELPH-MCNC: 1.8 G/DL (ref 0.4–1.8)
GLOBULIN SER CALC-MCNC: 3.3 G/DL (ref 2.2–3.9)
IGA SERPL-MCNC: 21 MG/DL (ref 61–437)
IGG SERPL-MCNC: 1807 MG/DL (ref 603–1613)
IGM SERPL-MCNC: 32 MG/DL (ref 15–143)
KAPPA LC FREE SER-MCNC: 55.6 MG/L (ref 3.3–19.4)
KAPPA LC FREE/LAMBDA FREE SER: 11.58 {RATIO} (ref 0.26–1.65)
LABORATORY COMMENT REPORT: ABNORMAL
LAMBDA LC FREE SERPL-MCNC: 4.8 MG/L (ref 5.7–26.3)
M PROTEIN SERPL ELPH-MCNC: 1.5 G/DL
PROT PATTERN SERPL IFE-IMP: ABNORMAL
PROT SERPL-MCNC: 7 G/DL (ref 6–8.5)

## 2021-10-05 NOTE — PROGRESS NOTES
Subjective     REASON FOR FOLLOW-UP:  Chronic leukopenia, smoldering myeloma, anemia                               REQUESTING PHYSICIAN:  Eduardo    RECORDS OBTAINED:  Records of the patients history including those obtained from the referring provider were reviewed and summarized in detail.      History of Present Illness   This is a very pleasant 78-year-old man returning today for follow-up of smoldering myeloma.  He is doing well with no shortness of breath, lightheadedness, dizziness, bone pain or other acute issues.      Past Medical History:   Diagnosis Date   • Arthritis     hands   • CAD (coronary artery disease)    • Diabetes (CMS/HCC)    • GERD (gastroesophageal reflux disease)    • H/O MGUS (monoclonal gammopathy of unknown significance)    • History of colon polyps    • History of herpes zoster 2007   • History of pneumonia    • Hyperlipidemia    • Hypertension    • Neutropenia (CMS/HCC)     H/O hereditary neutropenia   • Prostate cancer (CMS/HCC) 2007   • Stroke (CMS/HCC) 1997        Past Surgical History:   Procedure Laterality Date   • ANAL FISSURECTOMY     • CATARACT EXTRACTION Bilateral 2009   • COLONOSCOPY  06/2014    cecal polyp, sigmoid diverticulosis   • ENDOSCOPY  2001   • OTHER SURGICAL HISTORY  2007    Decortication and right lower lobe with resection   • PROSTATE BIOPSY  2007   • THORACOSCOPY          Current Outpatient Medications on File Prior to Visit   Medication Sig Dispense Refill   • atorvastatin (LIPITOR) 40 MG tablet Daily.     • clopidogrel (PLAVIX) 75 MG tablet Take 75 mg by mouth.     • cyclobenzaprine (FLEXERIL) 10 MG tablet Take 1 tablet by mouth 3 (Three) Times a Day As Needed for Muscle Spasms. 30 tablet 0   • diclofenac (VOLTAREN) 1 % gel gel Voltaren 1 % topical gel     • esomeprazole (nexIUM) 40 MG capsule Take 40 mg by mouth Every Morning Before Breakfast.     • HYDROcodone-acetaminophen (NORCO) 5-325 MG per tablet Take 1 tablet by mouth Every 6 (Six) Hours As Needed.    Medication Management [address]   [phone] (phone)  [phone] (fax)    Ms. Marge Joy is a 81 y.o.  female with history of PE, per referral from BENNIE Peace, who presents today for Warfarin monitoring and adjustment (4 week visit). Patient verifies current dosing regimen and tablet strength. No missed or extra doses. Patient denies bleeding/bruising/swelling/chest pain. Has usual SOB. Wears knee-high support hose bilat. No blood in urine or stool. No dietary changes. No changes in medication/OTC agents/herbals. Takes usual Tylenol for aches and pains. No change in alcohol use or tobacco use. No change in activity level. Patient denies headaches/dizziness/lightheadedness/falls. No vomiting/diarrhea or acute illness. No procedures scheduled in the future at this time. Assessment:   Lab Results   Component Value Date    INR 2.40 (H) 10/05/2021    INR 3.00 (H) 09/07/2021    INR 3.10 (H) 08/10/2021     INR therapeutic - goal 2-3. Recent Labs     10/05/21  1506   INR 2.40*       Plan:  POCT INR ordered/performed/result reviewed. Continue Coumadin 4 mg daily PO. Recheck INR in 4 week(s). Patient reminded to call the Anticoagulation Clinic with any signs or symptoms of bleeding or with any medication changes. Patient given instructions utilizing the teach back method. After visit summary printed and reviewed with patient. Discharged ambulatory in no apparent distress, wearing mask. "  • irbesartan-hydrochlorothiazide (AVALIDE) 300-12.5 MG tablet      • Multiple Vitamins-Minerals (MULTIVITAMIN ADULTS 50+ PO) Take  by mouth Daily.     • pregabalin (LYRICA) 50 MG capsule Take 50 mg by mouth Daily.     • sildenafil (REVATIO) 20 MG tablet TK 2 TS PO QD PRN  3   • valsartan-hydrochlorothiazide (DIOVAN-HCT) 320-25 MG per tablet 1 tablet Daily.       No current facility-administered medications on file prior to visit.         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        Family History   Problem Relation Age of Onset   • Uterine cancer Mother    • Hypertension Other    • Hyperlipidemia Other    • Arthritis Other    • Stroke Other    • Heart disease Sister    • Hypertension Brother         Review of Systems   Constitutional: Negative.    HENT: Negative.    Eyes: Negative.    Respiratory: Negative.    Cardiovascular: Negative.    Gastrointestinal: Negative.    Genitourinary: Negative.    Musculoskeletal: Positive for arthralgias.   Skin: Negative.    Allergic/Immunologic: Negative.    Neurological: Negative.    Hematological: Negative.    Psychiatric/Behavioral: Negative.     ROS unchanged- 4/18/2019      Objective     Vitals:    04/18/19 1005   BP: 152/81   Pulse: 68   Resp: 14   Temp: 98 °F (36.7 °C)   SpO2: 98%   Weight: 87.1 kg (192 lb)   Height: 180.3 cm (70.98\")   PainSc: 0-No " pain     Current Status 2019   ECOG score 0       Physical Exam    Con: pleasant, well-appearing man  HEENT: no icterus, no thrush, moist membranes  CV: RRR, S1S2  RESP: CTA bilat, no wheezing  GI: soft, nontender, no splenomegaly, +BS  MS: no edema  SKIN: no petechiae or bruising  NEURO: alert and oriented x3, normal strength, normal muscle tone  PSYCH: normal mood    Physical exam performed and unchanged from above-2019    RECENT LABS:  Hematology WBC   Date Value Ref Range Status   2019 3.98 3.40 - 10.80 10*3/mm3 Final     RBC   Date Value Ref Range Status   2019 3.53 (L) 4.14 - 5.80 10*6/mm3 Final     Hemoglobin   Date Value Ref Range Status   2019 11.7 (L) 13.0 - 17.7 g/dL Final     Hematocrit   Date Value Ref Range Status   2019 34.3 (L) 37.5 - 51.0 % Final     Platelets   Date Value Ref Range Status   2019 177 140 - 450 10*3/mm3 Final        Lab Results   Component Value Date    GLUCOSE 164 (H) 2019    BUN 18 2019    CREATININE 1.16 2019    EGFRIFAFRI 74 2019    BCR 15.5 2019    K 3.7 2019    CO2 23.9 2019    CALCIUM 8.7 2019    PROTENTOTREF 6.9 2019    ALBUMIN 4.10 2019    ALBUMIN 3.8 2019    LABIL2 1.2 2019    AST 48 (H) 2019    ALT 42 (H) 2019     Peripheral blood flow cytometry 3/12/18: No abnormal myeloid or lymphoid populations identified  B2M.8  FLC ratio 3.96-->4.55  ESR 12  M-spike 1.4 g/dL IgG kappa-->1.4 g/dL    Skeletal survey 3/12/18:  Negative.        Assessment/Plan     1.  Chronic leukopenia: The patient's ANC has run borderline low for a number of years.  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      Bone marrow exam was performed 1/11/19.  The bone marrow was mildly hypercellular 40% with involvement of a plasma cell dyscrasia 8% by aspirate and 15% by core biopsy.   Plasma cells were monoclonal kappa by flow cytometry.  There was trilineage hematopoiesis.  Congo red stain was negative for amyloid deposition.  Cytogenetics were normal; complete FISH panel could not be performed.  No dyspoiesis of the white cells noted.    4.  Smoldering myeloma:  M spike this visit stable 1.4 g/dL IgG.  He does have increased number of plasma cells by bone marrow biopsy 8% aspirate/15% core biopsy monoclonal kappa.  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  Labs reviewed today continued to show no worsening renal function, hypercalcemia, mild anemia.  Continue observation with repeat labs 4 months    3.  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  Iron studies, B12, folic acid without obvious deficiency.     Follow-up in 4 months with CBC, CMP, protein electrophoresis/immunofixation and light chain ratio ordered

## 2021-10-07 ENCOUNTER — OFFICE VISIT (OUTPATIENT)
Dept: ONCOLOGY | Facility: CLINIC | Age: 81
End: 2021-10-07

## 2021-10-07 ENCOUNTER — TELEPHONE (OUTPATIENT)
Dept: ONCOLOGY | Facility: CLINIC | Age: 81
End: 2021-10-07

## 2021-10-07 ENCOUNTER — APPOINTMENT (OUTPATIENT)
Dept: LAB | Facility: HOSPITAL | Age: 81
End: 2021-10-07

## 2021-10-07 VITALS
WEIGHT: 190.8 LBS | HEIGHT: 69 IN | HEART RATE: 74 BPM | DIASTOLIC BLOOD PRESSURE: 66 MMHG | OXYGEN SATURATION: 100 % | SYSTOLIC BLOOD PRESSURE: 137 MMHG | RESPIRATION RATE: 16 BRPM | BODY MASS INDEX: 28.26 KG/M2 | TEMPERATURE: 98.4 F

## 2021-10-07 DIAGNOSIS — D47.2 SMOLDERING MYELOMA: Primary | ICD-10-CM

## 2021-10-07 DIAGNOSIS — D70.8 OTHER NEUTROPENIA (HCC): ICD-10-CM

## 2021-10-07 PROCEDURE — 99213 OFFICE O/P EST LOW 20 MIN: CPT | Performed by: INTERNAL MEDICINE

## 2021-10-07 NOTE — PROGRESS NOTES
Subjective     REASON FOR FOLLOW-UP:  Chronic leukopenia, smoldering myeloma, anemia                               REQUESTING PHYSICIAN:  Eduardo    RECORDS OBTAINED:  Records of the patients history including those obtained from the referring provider were reviewed and summarized in detail.      History of Present Illness   This is a very pleasant 81-year-old man returning today for follow-up of smoldering myeloma.  The patient reports doing well with no significant shortness of breath, lightheadedness, dizziness, bleeding, bruising, fevers or chills, recent infections.  He has arthralgias which are stable.      Past Medical History:   Diagnosis Date   • Arthritis     hands   • CAD (coronary artery disease)    • COVID-19    • Diabetes (HCC)    • GERD (gastroesophageal reflux disease)    • H/O MGUS (monoclonal gammopathy of unknown significance)    • History of colon polyps    • History of herpes zoster 2007   • History of pneumonia    • Hyperlipidemia    • Hypertension    • Neutropenia (HCC)     H/O hereditary neutropenia   • Prostate cancer (HCC) 2007   • Stroke (HCC) 1997        Past Surgical History:   Procedure Laterality Date   • ANAL FISSURECTOMY     • CATARACT EXTRACTION Bilateral 2009   • COLONOSCOPY  06/2014    cecal polyp, sigmoid diverticulosis   • ENDOSCOPY  2001   • OTHER SURGICAL HISTORY  2007    Decortication and right lower lobe with resection   • PROSTATE BIOPSY  2007   • THORACOSCOPY          Current Outpatient Medications on File Prior to Visit   Medication Sig Dispense Refill   • acetaminophen (TYLENOL) 500 MG tablet Take 1 tablet by mouth Every 6 (Six) Hours As Needed for Mild Pain . 100 tablet 0   • atorvastatin (LIPITOR) 40 MG tablet Daily.     • benzonatate (TESSALON) 100 MG capsule benzonatate 100 mg capsule   Take 1 capsule 3 times a day by oral route as needed.     • clopidogrel (PLAVIX) 75 MG tablet Take 75 mg by mouth.     • cyclobenzaprine (FLEXERIL) 10 MG tablet Take 1 tablet by mouth  "3 (Three) Times a Day As Needed for Muscle Spasms. 30 tablet 0   • diclofenac (VOLTAREN) 1 % gel gel Voltaren 1 % topical gel     • esomeprazole (nexIUM) 40 MG capsule Take 40 mg by mouth Every Morning Before Breakfast.     • HYDROcodone-acetaminophen (NORCO) 5-325 MG per tablet Take 1 tablet by mouth Every 6 (Six) Hours As Needed.     • irbesartan-hydrochlorothiazide (AVALIDE) 300-12.5 MG tablet      • Multiple Vitamins-Minerals (MULTIVITAMIN ADULTS 50+ PO) Take  by mouth Daily.     • pregabalin (LYRICA) 50 MG capsule Take 50 mg by mouth Daily.     • sildenafil (REVATIO) 20 MG tablet TK 2 TS PO QD PRN  3     No current facility-administered medications on file prior to visit.        ALLERGIES:  No Known Allergies     Social History     Socioeconomic History   • Marital status:      Spouse name: Candace   • Number of children: Not on file   • Years of education: High school   • Highest education level: Not on file   Tobacco Use   • Smoking status: Former Smoker   Substance and Sexual Activity   • Alcohol use: Yes     Comment: Occasionally   • Drug use: No        Family History   Problem Relation Age of Onset   • Uterine cancer Mother    • Hypertension Other    • Hyperlipidemia Other    • Arthritis Other    • Stroke Other    • Heart disease Sister    • Hypertension Brother         Review of Systems   Constitutional: Negative.    HENT: Negative.    Eyes: Negative.    Respiratory: Negative.    Cardiovascular: Negative.    Gastrointestinal: Negative.    Genitourinary: Negative.    Musculoskeletal: Positive for arthralgias.   Skin: Negative.    Allergic/Immunologic: Negative.    Neurological: Negative.    Hematological: Negative.    Psychiatric/Behavioral: Negative.     ROS unchanged-10/7/2021      Objective     Vitals:    10/07/21 0904   BP: 137/66   Pulse: 74   Resp: 16   Temp: 98.4 °F (36.9 °C)   TempSrc: Temporal   SpO2: 100%   Weight: 86.5 kg (190 lb 12.8 oz)   Height: 175 cm (68.9\")   PainSc: 0-No pain "     Current Status 2021   ECOG score 0       Physical Exam    Con: pleasant, well-appearing man  HEENT: no icterus, no thrush, moist membranes  CV: RRR, S1S2  RESP: CTA bilat, no wheezing  GI: soft, nontender, no splenomegaly, +BS  MS: no edema, left knee in a brace  SKIN: no petechiae or bruising  NEURO: alert and oriented x3, normal strength, normal muscle tone  PSYCH: normal mood  Exam unchanged-10/7/2021    RECENT LABS:  Hematology WBC   Date Value Ref Range Status   2021 3.04 (L) 3.40 - 10.80 10*3/mm3 Final     RBC   Date Value Ref Range Status   2021 3.42 (L) 4.14 - 5.80 10*6/mm3 Final     Hemoglobin   Date Value Ref Range Status   2021 11.4 (L) 13.0 - 17.7 g/dL Final     Hematocrit   Date Value Ref Range Status   2021 33.4 (L) 37.5 - 51.0 % Final     Platelets   Date Value Ref Range Status   2021 158 140 - 450 10*3/mm3 Final        Lab Results   Component Value Date    GLUCOSE 145 (H) 2021    BUN 14 2021    CREATININE 1.18 2021    EGFRIFAFRI 72 2021    BCR 11.9 2021    K 3.6 2021    CO2 24.4 2021    CALCIUM 9.0 2021    PROTENTOTREF 7.0 2021    ALBUMIN 4.00 2021    ALBUMIN 3.7 2021    LABIL2 1.1 2021    AST 29 2021    ALT 26 2021     Peripheral blood flow cytometry 3/12/18: No abnormal myeloid or lymphoid populations identified  B2M.8  FLC ratio 3.96-->4.55->5.64-->10.05-->6.32-->11.58  M-spike 1.4 g/dL IgG kappa-->1.4 g/dL-->1.2-->1.5-->1.6-->1.5    Skeletal survey 3/12/18:  Negative.        Assessment/Plan     1.  Chronic leukopenia: The patient's ANC has run borderline low for a number of years.  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      Bone marrow exam was performed 19.  The bone marrow was mildly hypercellular 40% with involvement of a plasma cell dyscrasia 8% by aspirate  and 15% by core biopsy.  Plasma cells were monoclonal kappa by flow cytometry.  There was trilineage hematopoiesis.  Congo red stain was negative for amyloid deposition.  Cytogenetics were normal; complete FISH panel could not be performed.  No dyspoiesis of the white cells noted.    4.  Smoldering myeloma:  He does have increased number of plasma cells by bone marrow biopsy 8% aspirate/15% core biopsy monoclonal kappa.  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  CBC and CMP reviewed today are stable.  SPEP/LILLY and a free light chain ratio are stable with M spike 1.5 g/dL, free light chain ratio 11.58.   We will arrange him to have repeat CBC, CMP, SPEP/LILLY and a free light chain ratio again in 6 months.      3.  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  Iron studies, B12, folic acid without obvious deficiency.  Hemoglobin currently stable 11.4    Follow-up in 6 months with CBC, CMP, protein electrophoresis/immunofixation and light chain ratio ordered.

## 2021-10-07 NOTE — TELEPHONE ENCOUNTER
PT CALLED RUNNING 15-20 MIN LATE, FOR APPT CALLED AND SPOKE WITH TEAGAN IN SCHED WHO ADVISE WOULD TAKE NOTE ON PT BEING A LITTLE LATE.      I ADV THIS TO PT AND HE V/U.

## 2022-04-11 ENCOUNTER — TELEPHONE (OUTPATIENT)
Dept: ONCOLOGY | Facility: CLINIC | Age: 82
End: 2022-04-11

## 2022-04-11 NOTE — TELEPHONE ENCOUNTER
Caller: CHERIE    Relationship to patient: SPOUSE    Best call back number: [phone]    Chief complaint: PT SPOUSE NEEDS TO SCHEDULE APPOINTMENT FOR PT TO SEE DR WEISS     Type of visit: LAB,FU     Requested date: ANY DAY OR TIME     Additional notes: PT HAD TO CANCEL LAST LAB AND APPOINTMENT WITH DR WEISS, AND NEEDS TO R/S

## 2022-04-22 ENCOUNTER — LAB (OUTPATIENT)
Dept: LAB | Facility: HOSPITAL | Age: 82
End: 2022-04-22

## 2022-04-22 ENCOUNTER — OFFICE VISIT (OUTPATIENT)
Dept: ONCOLOGY | Facility: CLINIC | Age: 82
End: 2022-04-22

## 2022-04-22 VITALS
TEMPERATURE: 98.2 F | SYSTOLIC BLOOD PRESSURE: 147 MMHG | OXYGEN SATURATION: 97 % | WEIGHT: 195.1 LBS | BODY MASS INDEX: 28.9 KG/M2 | HEART RATE: 69 BPM | RESPIRATION RATE: 15 BRPM | DIASTOLIC BLOOD PRESSURE: 68 MMHG | HEIGHT: 69 IN

## 2022-04-22 DIAGNOSIS — D47.2 SMOLDERING MYELOMA: ICD-10-CM

## 2022-04-22 DIAGNOSIS — N18.31 ANEMIA IN STAGE 3A CHRONIC KIDNEY DISEASE: Primary | ICD-10-CM

## 2022-04-22 DIAGNOSIS — D70.8 OTHER NEUTROPENIA: ICD-10-CM

## 2022-04-22 DIAGNOSIS — D63.1 ANEMIA IN STAGE 3A CHRONIC KIDNEY DISEASE: Primary | ICD-10-CM

## 2022-04-22 LAB
ALBUMIN SERPL-MCNC: 4 G/DL (ref 3.5–5.2)
ALBUMIN/GLOB SERPL: 1.3 G/DL (ref 1.1–2.4)
ALP SERPL-CCNC: 58 U/L (ref 38–116)
ALT SERPL W P-5'-P-CCNC: 26 U/L (ref 0–41)
ANION GAP SERPL CALCULATED.3IONS-SCNC: 8.7 MMOL/L (ref 5–15)
AST SERPL-CCNC: 27 U/L (ref 0–40)
BASOPHILS # BLD AUTO: 0.02 10*3/MM3 (ref 0–0.2)
BASOPHILS NFR BLD AUTO: 0.6 % (ref 0–1.5)
BILIRUB SERPL-MCNC: 0.8 MG/DL (ref 0.2–1.2)
BUN SERPL-MCNC: 15 MG/DL (ref 6–20)
BUN/CREAT SERPL: 12.7 (ref 7.3–30)
CALCIUM SPEC-SCNC: 9.2 MG/DL (ref 8.5–10.2)
CHLORIDE SERPL-SCNC: 103 MMOL/L (ref 98–107)
CO2 SERPL-SCNC: 26.3 MMOL/L (ref 22–29)
CREAT SERPL-MCNC: 1.18 MG/DL (ref 0.7–1.3)
DEPRECATED RDW RBC AUTO: 43.1 FL (ref 37–54)
EGFRCR SERPLBLD CKD-EPI 2021: 62 ML/MIN/1.73
EOSINOPHIL # BLD AUTO: 0.05 10*3/MM3 (ref 0–0.4)
EOSINOPHIL NFR BLD AUTO: 1.5 % (ref 0.3–6.2)
ERYTHROCYTE [DISTWIDTH] IN BLOOD BY AUTOMATED COUNT: 12.3 % (ref 12.3–15.4)
GLOBULIN UR ELPH-MCNC: 3.1 GM/DL (ref 1.8–3.5)
GLUCOSE SERPL-MCNC: 145 MG/DL (ref 74–124)
HCT VFR BLD AUTO: 33.6 % (ref 37.5–51)
HGB BLD-MCNC: 11.8 G/DL (ref 13–17.7)
IMM GRANULOCYTES # BLD AUTO: 0 10*3/MM3 (ref 0–0.05)
IMM GRANULOCYTES NFR BLD AUTO: 0 % (ref 0–0.5)
LYMPHOCYTES # BLD AUTO: 1.54 10*3/MM3 (ref 0.7–3.1)
LYMPHOCYTES NFR BLD AUTO: 45.7 % (ref 19.6–45.3)
MCH RBC QN AUTO: 33.8 PG (ref 26.6–33)
MCHC RBC AUTO-ENTMCNC: 35.1 G/DL (ref 31.5–35.7)
MCV RBC AUTO: 96.3 FL (ref 79–97)
MONOCYTES # BLD AUTO: 0.33 10*3/MM3 (ref 0.1–0.9)
MONOCYTES NFR BLD AUTO: 9.8 % (ref 5–12)
NEUTROPHILS NFR BLD AUTO: 1.43 10*3/MM3 (ref 1.7–7)
NEUTROPHILS NFR BLD AUTO: 42.4 % (ref 42.7–76)
NRBC BLD AUTO-RTO: 0 /100 WBC (ref 0–0.2)
PLATELET # BLD AUTO: 168 10*3/MM3 (ref 140–450)
PMV BLD AUTO: 9 FL (ref 6–12)
POTASSIUM SERPL-SCNC: 3.9 MMOL/L (ref 3.5–4.7)
PROT SERPL-MCNC: 7.1 G/DL (ref 6.3–8)
RBC # BLD AUTO: 3.49 10*6/MM3 (ref 4.14–5.8)
SODIUM SERPL-SCNC: 138 MMOL/L (ref 134–145)
WBC NRBC COR # BLD: 3.37 10*3/MM3 (ref 3.4–10.8)

## 2022-04-22 PROCEDURE — 85025 COMPLETE CBC W/AUTO DIFF WBC: CPT

## 2022-04-22 PROCEDURE — 99213 OFFICE O/P EST LOW 20 MIN: CPT | Performed by: INTERNAL MEDICINE

## 2022-04-22 PROCEDURE — 80053 COMPREHEN METABOLIC PANEL: CPT

## 2022-04-22 PROCEDURE — 36415 COLL VENOUS BLD VENIPUNCTURE: CPT

## 2022-04-22 RX ORDER — MELOXICAM 7.5 MG/1
TABLET ORAL
COMMUNITY

## 2022-04-22 RX ORDER — LOTEPREDNOL ETABONATE 2 MG/ML
SUSPENSION/ DROPS OPHTHALMIC
COMMUNITY
Start: 2022-04-12

## 2022-04-22 RX ORDER — PSEUDOEPHEDRINE HCL 30 MG
TABLET ORAL
COMMUNITY

## 2022-04-22 NOTE — PROGRESS NOTES
Subjective     REASON FOR FOLLOW-UP:  Chronic leukopenia, smoldering myeloma, anemia                               REQUESTING PHYSICIAN:  Eduardo    RECORDS OBTAINED:  Records of the patients history including those obtained from the referring provider were reviewed and summarized in detail.      History of Present Illness   This is a very pleasant 82-year-old man returning today for follow-up of smoldering myeloma.  The patient reports doing well with no significant shortness of breath, lightheadedness, dizziness, bleeding, bruising, fevers or chills, recent infections.  He has arthralgias which are stable.  No acute changes in his pain or specific sites of pain reported.      Past Medical History:   Diagnosis Date   • Arthritis     hands   • CAD (coronary artery disease)    • COVID-19    • Diabetes (HCC)    • GERD (gastroesophageal reflux disease)    • H/O MGUS (monoclonal gammopathy of unknown significance)    • History of colon polyps    • History of herpes zoster 2007   • History of pneumonia    • Hyperlipidemia    • Hypertension    • Neutropenia (HCC)     H/O hereditary neutropenia   • Prostate cancer (HCC) 2007   • Stroke (HCC) 1997        Past Surgical History:   Procedure Laterality Date   • ANAL FISSURECTOMY     • CATARACT EXTRACTION Bilateral 2009   • COLONOSCOPY  06/2014    cecal polyp, sigmoid diverticulosis   • ENDOSCOPY  2001   • OTHER SURGICAL HISTORY  2007    Decortication and right lower lobe with resection   • PROSTATE BIOPSY  2007   • THORACOSCOPY          Current Outpatient Medications on File Prior to Visit   Medication Sig Dispense Refill   • acetaminophen (TYLENOL) 500 MG tablet Take 1 tablet by mouth Every 6 (Six) Hours As Needed for Mild Pain . 100 tablet 0   • Alrex 0.2 % suspension SHAKE LIQUID AND INSTILL 1 DROP IN BOTH EYES FOUR TIMES DAILY     • atorvastatin (LIPITOR) 40 MG tablet Daily.     • benzonatate (TESSALON) 100 MG capsule benzonatate 100 mg capsule   Take 1 capsule 3 times a  day by oral route as needed.     • clopidogrel (PLAVIX) 75 MG tablet Take 75 mg by mouth.     • cyclobenzaprine (FLEXERIL) 10 MG tablet Take 1 tablet by mouth 3 (Three) Times a Day As Needed for Muscle Spasms. 30 tablet 0   • diclofenac (VOLTAREN) 1 % gel gel Voltaren 1 % topical gel     • esomeprazole (nexIUM) 40 MG capsule Take 40 mg by mouth Every Morning Before Breakfast.     • HYDROcodone-acetaminophen (NORCO) 5-325 MG per tablet Take 1 tablet by mouth Every 6 (Six) Hours As Needed.     • irbesartan-hydrochlorothiazide (AVALIDE) 300-12.5 MG tablet      • meloxicam (MOBIC) 7.5 MG tablet meloxicam 7.5 mg tablet   TAKE 1 TABLET BY MOUTH EVERY DAY WITH MEALS     • Multiple Vitamins-Minerals (MULTIVITAMIN ADULTS 50+ PO) Take  by mouth Daily.     • pregabalin (LYRICA) 50 MG capsule Take 50 mg by mouth Daily.     • sildenafil (REVATIO) 20 MG tablet TK 2 TS PO QD PRN  3   • docusate sodium 100 MG capsule docusate sodium 100 mg capsule   Take 1 capsule every day by oral route.       No current facility-administered medications on file prior to visit.        ALLERGIES:  No Known Allergies     Social History     Socioeconomic History   • Marital status:      Spouse name: Candace   • Years of education: High school   Tobacco Use   • Smoking status: Former Smoker   Substance and Sexual Activity   • Alcohol use: Yes     Comment: Occasionally   • Drug use: No        Family History   Problem Relation Age of Onset   • Uterine cancer Mother    • Hypertension Other    • Hyperlipidemia Other    • Arthritis Other    • Stroke Other    • Heart disease Sister    • Hypertension Brother         Review of Systems   Constitutional: Negative.    HENT: Negative.    Eyes: Negative.    Respiratory: Negative.    Cardiovascular: Negative.    Gastrointestinal: Negative.    Genitourinary: Negative.    Musculoskeletal: Positive for arthralgias.   Skin: Negative.    Allergic/Immunologic: Negative.    Neurological: Negative.   "  Hematological: Negative.    Psychiatric/Behavioral: Negative.     ROS unchanged- 2022      Objective     Vitals:    22   BP: 147/68   Pulse: 69   Resp: 15   Temp: 98.2 °F (36.8 °C)   TempSrc: Temporal   SpO2: 97%   Weight: 88.5 kg (195 lb 1.6 oz)   Height: 175 cm (68.9\")   PainSc: 0-No pain     Current Status 2022   ECOG score 0       Physical Exam    Con: pleasant, well-appearing man  HEENT: no icterus, no thrush, moist membranes  CV: RRR, S1S2  RESP: CTA bilat, no wheezing  GI: soft, nontender, no splenomegaly, +BS  MS: no edema, left knee in a brace  SKIN: no petechiae or bruising  NEURO: alert and oriented x3, normal strength, normal muscle tone  PSYCH: normal mood  Exam unchanged- 2022    RECENT LABS:  Hematology WBC   Date Value Ref Range Status   2022 3.37 (L) 3.40 - 10.80 10*3/mm3 Final     RBC   Date Value Ref Range Status   2022 3.49 (L) 4.14 - 5.80 10*6/mm3 Final     Hemoglobin   Date Value Ref Range Status   2022 11.8 (L) 13.0 - 17.7 g/dL Final     Hematocrit   Date Value Ref Range Status   2022 33.6 (L) 37.5 - 51.0 % Final     Platelets   Date Value Ref Range Status   2022 168 140 - 450 10*3/mm3 Final        Lab Results   Component Value Date    GLUCOSE 145 (H) 2021    BUN 14 2021    CREATININE 1.18 2021    EGFRIFAFRI 72 2021    BCR 11.9 2021    K 3.6 2021    CO2 24.4 2021    CALCIUM 9.0 2021    PROTENTOTREF 7.0 2021    ALBUMIN 4.00 2021    ALBUMIN 3.7 2021    LABIL2 1.1 2021    AST 29 2021    ALT 26 2021     Peripheral blood flow cytometry 3/12/18: No abnormal myeloid or lymphoid populations identified  B2M.8  FLC ratio 3.96-->4.55->5.64-->10.05-->6.32-->11.58  M-spike 1.4 g/dL IgG kappa-->1.4 g/dL-->1.2-->1.5-->1.6-->1.5    Skeletal survey 3/12/18:  Negative.        Assessment/Plan     1.  Chronic leukopenia: The patient's ANC has run borderline low for a " number of years.  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      Bone marrow exam was performed 1/11/19.  The bone marrow was mildly hypercellular 40% with involvement of a plasma cell dyscrasia 8% by aspirate and 15% by core biopsy.  Plasma cells were monoclonal kappa by flow cytometry.  There was trilineage hematopoiesis.  Congo red stain was negative for amyloid deposition.  Cytogenetics were normal; complete FISH panel could not be performed.  No dyspoiesis of the white cells noted.    4.  Smoldering myeloma:  He does have increased number of plasma cells by bone marrow biopsy 8% aspirate/15% core biopsy monoclonal kappa.  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  CBC today is stable.  CMP, SPEP LILLY and free light chain ratio are pending.  We will call him with results and assuming stability repeat levels in 6 months.     3.  Mild normocytic anemia:  the patient has stage II-III 3A chronic kidney disease as the likely cause.  Iron studies, B12, folic acid without obvious deficiency.  Hemoglobin currently stable 11.8    Follow-up in 6 months with CBC, CMP, protein electrophoresis/immunofixation and light chain ratio ordered.

## 2022-04-25 LAB
ALBUMIN SERPL ELPH-MCNC: 3.9 G/DL (ref 2.9–4.4)
ALBUMIN/GLOB SERPL: 1.3 {RATIO} (ref 0.7–1.7)
ALPHA1 GLOB SERPL ELPH-MCNC: 0.2 G/DL (ref 0–0.4)
ALPHA2 GLOB SERPL ELPH-MCNC: 0.4 G/DL (ref 0.4–1)
B-GLOBULIN SERPL ELPH-MCNC: 0.8 G/DL (ref 0.7–1.3)
GAMMA GLOB SERPL ELPH-MCNC: 1.7 G/DL (ref 0.4–1.8)
GLOBULIN SER-MCNC: 3.1 G/DL (ref 2.2–3.9)
IGA SERPL-MCNC: 27 MG/DL (ref 61–437)
IGG SERPL-MCNC: 1923 MG/DL (ref 603–1613)
IGM SERPL-MCNC: 33 MG/DL (ref 15–143)
INTERPRETATION SERPL IEP-IMP: ABNORMAL
KAPPA LC FREE SER-MCNC: 41.6 MG/L (ref 3.3–19.4)
KAPPA LC FREE/LAMBDA FREE SER: 8.49 {RATIO} (ref 0.26–1.65)
LABORATORY COMMENT REPORT: ABNORMAL
LAMBDA LC FREE SERPL-MCNC: 4.9 MG/L (ref 5.7–26.3)
M PROTEIN SERPL ELPH-MCNC: 1.5 G/DL
PROT SERPL-MCNC: 7 G/DL (ref 6–8.5)

## 2022-04-26 ENCOUNTER — TELEPHONE (OUTPATIENT)
Dept: ONCOLOGY | Facility: CLINIC | Age: 82
End: 2022-04-26

## 2022-04-26 NOTE — TELEPHONE ENCOUNTER
----- Message from Josh Daigle MD sent at 4/26/2022  7:57 AM EDT -----  Please let the patient know that his protein studies returned stable from prior visits.  We will recheck as scheduled.

## 2022-10-28 ENCOUNTER — TELEPHONE (OUTPATIENT)
Dept: ONCOLOGY | Facility: CLINIC | Age: 82
End: 2022-10-28

## 2022-10-28 NOTE — TELEPHONE ENCOUNTER
Caller: Antoine Story    Relationship to patient: Self    Best call back number: [phone]    Chief complaint:PATIENT TO RESCHEDULE 10/21/22 APPT    Type of visit: LAB AND FU    Requested date: NEXT AVAILABLE. OUT OF TOWN 11/4/2-11/8/22

## 2022-11-09 ENCOUNTER — OFFICE VISIT (OUTPATIENT)
Dept: ONCOLOGY | Facility: CLINIC | Age: 82
End: 2022-11-09

## 2022-11-09 ENCOUNTER — LAB (OUTPATIENT)
Dept: LAB | Facility: HOSPITAL | Age: 82
End: 2022-11-09

## 2022-11-09 VITALS
HEIGHT: 69 IN | SYSTOLIC BLOOD PRESSURE: 144 MMHG | BODY MASS INDEX: 28.6 KG/M2 | DIASTOLIC BLOOD PRESSURE: 69 MMHG | TEMPERATURE: 97.1 F | HEART RATE: 78 BPM | OXYGEN SATURATION: 98 % | RESPIRATION RATE: 18 BRPM | WEIGHT: 193.1 LBS

## 2022-11-09 DIAGNOSIS — D63.1 ANEMIA IN STAGE 3A CHRONIC KIDNEY DISEASE: ICD-10-CM

## 2022-11-09 DIAGNOSIS — N18.31 ANEMIA IN STAGE 3A CHRONIC KIDNEY DISEASE: ICD-10-CM

## 2022-11-09 DIAGNOSIS — D47.2 SMOLDERING MYELOMA: Primary | ICD-10-CM

## 2022-11-09 DIAGNOSIS — D47.2 SMOLDERING MYELOMA: ICD-10-CM

## 2022-11-09 LAB
ALBUMIN SERPL-MCNC: 3.9 G/DL (ref 3.5–5.2)
ALBUMIN/GLOB SERPL: 1.1 G/DL
ALP SERPL-CCNC: 54 U/L (ref 39–117)
ALT SERPL W P-5'-P-CCNC: 31 U/L (ref 1–41)
ANION GAP SERPL CALCULATED.3IONS-SCNC: 9.6 MMOL/L (ref 5–15)
AST SERPL-CCNC: 27 U/L (ref 1–40)
BASOPHILS # BLD AUTO: 0.01 10*3/MM3 (ref 0–0.2)
BASOPHILS NFR BLD AUTO: 0.3 % (ref 0–1.5)
BILIRUB SERPL-MCNC: 0.8 MG/DL (ref 0–1.2)
BUN SERPL-MCNC: 15 MG/DL (ref 8–23)
BUN/CREAT SERPL: 14.4 (ref 7–25)
CALCIUM SPEC-SCNC: 8.9 MG/DL (ref 8.6–10.5)
CHLORIDE SERPL-SCNC: 109 MMOL/L (ref 98–107)
CO2 SERPL-SCNC: 25.4 MMOL/L (ref 22–29)
CREAT SERPL-MCNC: 1.04 MG/DL (ref 0.76–1.27)
DEPRECATED RDW RBC AUTO: 43.8 FL (ref 37–54)
EGFRCR SERPLBLD CKD-EPI 2021: 72.1 ML/MIN/1.73
EOSINOPHIL # BLD AUTO: 0.03 10*3/MM3 (ref 0–0.4)
EOSINOPHIL NFR BLD AUTO: 0.8 % (ref 0.3–6.2)
ERYTHROCYTE [DISTWIDTH] IN BLOOD BY AUTOMATED COUNT: 12.5 % (ref 12.3–15.4)
GLOBULIN UR ELPH-MCNC: 3.5 GM/DL
GLUCOSE SERPL-MCNC: 104 MG/DL (ref 65–99)
HCT VFR BLD AUTO: 33.9 % (ref 37.5–51)
HGB BLD-MCNC: 11.7 G/DL (ref 13–17.7)
IMM GRANULOCYTES # BLD AUTO: 0.03 10*3/MM3 (ref 0–0.05)
IMM GRANULOCYTES NFR BLD AUTO: 0.8 % (ref 0–0.5)
LYMPHOCYTES # BLD AUTO: 1.71 10*3/MM3 (ref 0.7–3.1)
LYMPHOCYTES NFR BLD AUTO: 47.2 % (ref 19.6–45.3)
MCH RBC QN AUTO: 33.4 PG (ref 26.6–33)
MCHC RBC AUTO-ENTMCNC: 34.5 G/DL (ref 31.5–35.7)
MCV RBC AUTO: 96.9 FL (ref 79–97)
MONOCYTES # BLD AUTO: 0.41 10*3/MM3 (ref 0.1–0.9)
MONOCYTES NFR BLD AUTO: 11.3 % (ref 5–12)
NEUTROPHILS NFR BLD AUTO: 1.43 10*3/MM3 (ref 1.7–7)
NEUTROPHILS NFR BLD AUTO: 39.6 % (ref 42.7–76)
NRBC BLD AUTO-RTO: 0 /100 WBC (ref 0–0.2)
PLATELET # BLD AUTO: 191 10*3/MM3 (ref 140–450)
PMV BLD AUTO: 9 FL (ref 6–12)
POTASSIUM SERPL-SCNC: 3.6 MMOL/L (ref 3.5–5.2)
PROT SERPL-MCNC: 7.4 G/DL (ref 6–8.5)
RBC # BLD AUTO: 3.5 10*6/MM3 (ref 4.14–5.8)
SODIUM SERPL-SCNC: 144 MMOL/L (ref 136–145)
WBC NRBC COR # BLD: 3.62 10*3/MM3 (ref 3.4–10.8)

## 2022-11-09 PROCEDURE — 36415 COLL VENOUS BLD VENIPUNCTURE: CPT

## 2022-11-09 PROCEDURE — 99214 OFFICE O/P EST MOD 30 MIN: CPT | Performed by: NURSE PRACTITIONER

## 2022-11-09 PROCEDURE — 80053 COMPREHEN METABOLIC PANEL: CPT | Performed by: INTERNAL MEDICINE

## 2022-11-09 PROCEDURE — 85025 COMPLETE CBC W/AUTO DIFF WBC: CPT

## 2022-11-09 RX ORDER — AMLODIPINE BESYLATE 2.5 MG/1
2.5 TABLET ORAL DAILY
COMMUNITY
Start: 2022-11-01

## 2022-11-09 NOTE — PROGRESS NOTES
Subjective     REASON FOR FOLLOW-UP:  Chronic leukopenia, smoldering myeloma, anemia                               REQUESTING PHYSICIAN:  Eduardo    RECORDS OBTAINED:  Records of the patients history including those obtained from the referring provider were reviewed and summarized in detail.      History of Present Illness   This is a very pleasant 82-year-old man returning today for follow-up of smoldering myeloma.  He denies any recent health changes, not medication changes, recent weight los, or new pain.    Past Medical History:   Diagnosis Date   • Arthritis     hands   • CAD (coronary artery disease)    • COVID-19    • Diabetes (HCC)    • GERD (gastroesophageal reflux disease)    • H/O MGUS (monoclonal gammopathy of unknown significance)    • History of colon polyps    • History of herpes zoster 2007   • History of pneumonia    • Hyperlipidemia    • Hypertension    • Neutropenia (HCC)     H/O hereditary neutropenia   • Prostate cancer (HCC) 2007   • Stroke (HCC) 1997        Past Surgical History:   Procedure Laterality Date   • ANAL FISSURECTOMY     • CATARACT EXTRACTION Bilateral 2009   • COLONOSCOPY  06/2014    cecal polyp, sigmoid diverticulosis   • ENDOSCOPY  2001   • OTHER SURGICAL HISTORY  2007    Decortication and right lower lobe with resection   • PROSTATE BIOPSY  2007   • THORACOSCOPY          Current Outpatient Medications on File Prior to Visit   Medication Sig Dispense Refill   • acetaminophen (TYLENOL) 500 MG tablet Take 1 tablet by mouth Every 6 (Six) Hours As Needed for Mild Pain . 100 tablet 0   • Alrex 0.2 % suspension SHAKE LIQUID AND INSTILL 1 DROP IN BOTH EYES FOUR TIMES DAILY     • amLODIPine (NORVASC) 2.5 MG tablet Take 1 tablet by mouth Daily.     • atorvastatin (LIPITOR) 40 MG tablet Daily.     • benzonatate (TESSALON) 100 MG capsule benzonatate 100 mg capsule   Take 1 capsule 3 times a day by oral route as needed.     • clopidogrel (PLAVIX) 75 MG tablet Take 75 mg by mouth.     •  cyclobenzaprine (FLEXERIL) 10 MG tablet Take 1 tablet by mouth 3 (Three) Times a Day As Needed for Muscle Spasms. 30 tablet 0   • diclofenac (VOLTAREN) 1 % gel gel Voltaren 1 % topical gel     • docusate sodium 100 MG capsule docusate sodium 100 mg capsule   Take 1 capsule every day by oral route.     • esomeprazole (nexIUM) 40 MG capsule Take 40 mg by mouth Every Morning Before Breakfast.     • HYDROcodone-acetaminophen (NORCO) 5-325 MG per tablet Take 1 tablet by mouth Every 6 (Six) Hours As Needed.     • irbesartan-hydrochlorothiazide (AVALIDE) 300-12.5 MG tablet      • meloxicam (MOBIC) 7.5 MG tablet meloxicam 7.5 mg tablet   TAKE 1 TABLET BY MOUTH EVERY DAY WITH MEALS     • Multiple Vitamins-Minerals (MULTIVITAMIN ADULTS 50+ PO) Take  by mouth Daily.     • pregabalin (LYRICA) 50 MG capsule Take 50 mg by mouth Daily.     • sildenafil (REVATIO) 20 MG tablet TK 2 TS PO QD PRN  3     No current facility-administered medications on file prior to visit.        ALLERGIES:  No Known Allergies     Social History     Socioeconomic History   • Marital status:      Spouse name: Candace   • Years of education: High school   Tobacco Use   • Smoking status: Former   Substance and Sexual Activity   • Alcohol use: Yes     Comment: Occasionally   • Drug use: No        Family History   Problem Relation Age of Onset   • Uterine cancer Mother    • Hypertension Other    • Hyperlipidemia Other    • Arthritis Other    • Stroke Other    • Heart disease Sister    • Hypertension Brother         Review of Systems   Constitutional: Negative.    HENT: Negative.    Eyes: Negative.    Respiratory: Negative.    Cardiovascular: Negative.    Gastrointestinal: Negative.    Genitourinary: Negative.    Musculoskeletal: Positive for arthralgias.   Skin: Negative.    Allergic/Immunologic: Negative.    Neurological: Negative.    Hematological: Negative.    Psychiatric/Behavioral: Negative.     ROS unchanged- 11/9/2022      Objective  "    Vitals:    22 1220   BP: 144/69   Pulse: 78   Resp: 18   Temp: 97.1 °F (36.2 °C)   TempSrc: Temporal   SpO2: 98%   Weight: 87.6 kg (193 lb 1.6 oz)   Height: 175 cm (68.9\")   PainSc: 0-No pain     Current Status 2022   ECOG score 0       Physical Exam    Con: pleasant, well-appearing man  HEENT: no icterus, no thrush, moist membranes  CV: RRR, S1S2  RESP: CTA bilat, no wheezing  GI: soft, nontender, no splenomegaly, +BS  MS: no edema, left knee in a brace  SKIN: no petechiae or bruising  NEURO: alert and oriented x3, normal strength, normal muscle tone  PSYCH: normal mood  Exam unchanged-2022    RECENT LABS:  Hematology WBC   Date Value Ref Range Status   2022 3.62 3.40 - 10.80 10*3/mm3 Final     RBC   Date Value Ref Range Status   2022 3.50 (L) 4.14 - 5.80 10*6/mm3 Final     Hemoglobin   Date Value Ref Range Status   2022 11.7 (L) 13.0 - 17.7 g/dL Final     Hematocrit   Date Value Ref Range Status   2022 33.9 (L) 37.5 - 51.0 % Final     Platelets   Date Value Ref Range Status   2022 191 140 - 450 10*3/mm3 Final        Lab Results   Component Value Date    GLUCOSE 145 (H) 2022    BUN 15 2022    CREATININE 1.18 2022    EGFRIFAFRI 72 2021    BCR 12.7 2022    K 3.9 2022    CO2 26.3 2022    CALCIUM 9.2 2022    PROTENTOTREF 7.0 2022    ALBUMIN 4.00 2022    ALBUMIN 3.9 2022    LABIL2 1.3 2022    AST 27 2022    ALT 26 2022     Peripheral blood flow cytometry 3/12/18: No abnormal myeloid or lymphoid populations identified  B2M.8  FLC ratio 3.96-->4.55->5.64-->10.05-->6.32-->11.58  M-spike 1.4 g/dL IgG kappa-->1.4 g/dL-->1.2-->1.5-->1.6-->1.5    Skeletal survey 3/12/18:  Negative.        Assessment & Plan     1.  Chronic leukopenia: The patient's ANC has run borderline low for a number of years.  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      Bone marrow exam was performed 1/11/19.  The bone marrow was mildly hypercellular 40% with involvement of a plasma cell dyscrasia 8% by aspirate and 15% by core biopsy.  Plasma cells were monoclonal kappa by flow cytometry.  There was trilineage hematopoiesis.  Congo red stain was negative for amyloid deposition.  Cytogenetics were normal; complete FISH panel could not be performed.  No dyspoiesis of the white cells noted.    WBC today 3.6 to.  ANC study from last visit at 1.43.    4.  Smoldering myeloma:  He does have increased number of plasma cells by bone marrow biopsy 8% aspirate/15% core biopsy monoclonal kappa.  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  CBC today remains overall stable, if not improved.  CMP, SPEP LILLY and free light chain ratio are pending.  We will continue current plan and pending stability in the addional labwork, he will see Dr Daigle with repeat labs ln in 6 months. ..     3.  Mild normocytic anemia:  the patient has stage II-III 3A chronic kidney disease as the likely cause.  Iron studies, B12, folic acid without obvious deficiency.  Hemoglobin currently stable 11.7    Follow-up in 6 months with CBC, CMP, protein electrophoresis/immunofixation and light chain ratio ordered.

## 2022-11-10 LAB
ALBUMIN SERPL ELPH-MCNC: 3.7 G/DL (ref 2.9–4.4)
ALBUMIN/GLOB SERPL: 1.2 {RATIO} (ref 0.7–1.7)
ALPHA1 GLOB SERPL ELPH-MCNC: 0.2 G/DL (ref 0–0.4)
ALPHA2 GLOB SERPL ELPH-MCNC: 0.5 G/DL (ref 0.4–1)
B-GLOBULIN SERPL ELPH-MCNC: 0.8 G/DL (ref 0.7–1.3)
GAMMA GLOB SERPL ELPH-MCNC: 1.8 G/DL (ref 0.4–1.8)
GLOBULIN SER-MCNC: 3.3 G/DL (ref 2.2–3.9)
IGA SERPL-MCNC: 25 MG/DL (ref 61–437)
IGG SERPL-MCNC: 2069 MG/DL (ref 603–1613)
IGM SERPL-MCNC: 40 MG/DL (ref 15–143)
INTERPRETATION SERPL IEP-IMP: ABNORMAL
KAPPA LC FREE SER-MCNC: 39 MG/L (ref 3.3–19.4)
KAPPA LC FREE/LAMBDA FREE SER: 7.65 {RATIO} (ref 0.26–1.65)
LABORATORY COMMENT REPORT: ABNORMAL
LAMBDA LC FREE SERPL-MCNC: 5.1 MG/L (ref 5.7–26.3)
M PROTEIN SERPL ELPH-MCNC: 1.6 G/DL
PROT SERPL-MCNC: 7 G/DL (ref 6–8.5)

## 2023-05-11 NOTE — PROGRESS NOTES
Subjective     REASON FOR FOLLOW-UP:  Chronic leukopenia, smoldering myeloma, anemia                               REQUESTING PHYSICIAN:  Eduardo    RECORDS OBTAINED:  Records of the patients history including those obtained from the referring provider were reviewed and summarized in detail.      History of Present Illness   This is a very pleasant 83-year-old man returning today for follow-up of smoldering myeloma.  The patient states he has not been feeling well for the past few weeks with decrease in his stamina although no specific pain, focal weakness, fever, chills etc.  He is not having chest pain or shortness of breath.  He states that when he goes to perform a task that he would normally complete without difficulty, he has to sit and rest.      Past Medical History:   Diagnosis Date   • Arthritis     hands   • CAD (coronary artery disease)    • COVID-19    • Diabetes    • GERD (gastroesophageal reflux disease)    • H/O MGUS (monoclonal gammopathy of unknown significance)    • History of colon polyps    • History of herpes zoster 2007   • History of pneumonia    • Hyperlipidemia    • Hypertension    • Neutropenia     H/O hereditary neutropenia   • Prostate cancer 2007   • Stroke 1997        Past Surgical History:   Procedure Laterality Date   • ANAL FISSURECTOMY     • CATARACT EXTRACTION Bilateral 2009   • COLONOSCOPY  06/2014    cecal polyp, sigmoid diverticulosis   • ENDOSCOPY  2001   • OTHER SURGICAL HISTORY  2007    Decortication and right lower lobe with resection   • PROSTATE BIOPSY  2007   • THORACOSCOPY          Current Outpatient Medications on File Prior to Visit   Medication Sig Dispense Refill   • acetaminophen (TYLENOL) 500 MG tablet Take 1 tablet by mouth Every 6 (Six) Hours As Needed for Mild Pain . 100 tablet 0   • Alrex 0.2 % suspension SHAKE LIQUID AND INSTILL 1 DROP IN BOTH EYES FOUR TIMES DAILY     • amLODIPine (NORVASC) 2.5 MG tablet Take 1 tablet by mouth Daily.     • atorvastatin  (LIPITOR) 40 MG tablet Daily.     • benzonatate (TESSALON) 100 MG capsule benzonatate 100 mg capsule   Take 1 capsule 3 times a day by oral route as needed.     • clopidogrel (PLAVIX) 75 MG tablet Take 1 tablet by mouth.     • cyclobenzaprine (FLEXERIL) 10 MG tablet Take 1 tablet by mouth 3 (Three) Times a Day As Needed for Muscle Spasms. 30 tablet 0   • diclofenac (VOLTAREN) 1 % gel gel Voltaren 1 % topical gel     • docusate sodium 100 MG capsule docusate sodium 100 mg capsule   Take 1 capsule every day by oral route.     • esomeprazole (nexIUM) 40 MG capsule Take 1 capsule by mouth Every Morning Before Breakfast.     • HYDROcodone-acetaminophen (NORCO) 5-325 MG per tablet Take 1 tablet by mouth Every 6 (Six) Hours As Needed.     • irbesartan-hydrochlorothiazide (AVALIDE) 300-12.5 MG tablet      • meloxicam (MOBIC) 7.5 MG tablet meloxicam 7.5 mg tablet   TAKE 1 TABLET BY MOUTH EVERY DAY WITH MEALS     • Multiple Vitamins-Minerals (MULTIVITAMIN ADULTS 50+ PO) Take  by mouth Daily.     • pregabalin (LYRICA) 50 MG capsule Take 1 capsule by mouth Daily.     • sildenafil (REVATIO) 20 MG tablet TK 2 TS PO QD PRN  3     No current facility-administered medications on file prior to visit.        ALLERGIES:  No Known Allergies     Social History     Socioeconomic History   • Marital status:      Spouse name: Candace   • Years of education: High school   Tobacco Use   • Smoking status: Former   Substance and Sexual Activity   • Alcohol use: Yes     Comment: Occasionally   • Drug use: No        Family History   Problem Relation Age of Onset   • Uterine cancer Mother    • Hypertension Other    • Hyperlipidemia Other    • Arthritis Other    • Stroke Other    • Heart disease Sister    • Hypertension Brother         Review of Systems   Constitutional: Positive for activity change and fatigue.   HENT: Negative.    Eyes: Negative.    Respiratory: Negative.    Cardiovascular: Negative.    Gastrointestinal: Negative.   "  Genitourinary: Negative.    Musculoskeletal: Positive for arthralgias.   Skin: Negative.    Allergic/Immunologic: Negative.    Neurological: Negative.    Hematological: Negative.    Psychiatric/Behavioral: Negative.          Objective     Vitals:    23 1037   BP: 137/73   Pulse: 79   Resp: 18   Temp: 97.5 °F (36.4 °C)   TempSrc: Temporal   SpO2: 96%   Weight: 88 kg (194 lb 1.6 oz)   Height: 175 cm (68.9\")   PainSc: 0-No pain         2023    10:42 AM   Current Status   ECOG score 0       Physical Exam    Con: pleasant, well-appearing man  HEENT: no icterus, no thrush, moist membranes  CV: RRR, S1S2  RESP: CTA bilat, no wheezing  GI: soft, nontender, no splenomegaly, +BS  MS: no edema, left knee in a brace  SKIN: no petechiae or bruising  NEURO: alert and oriented x3, normal strength, normal muscle tone  PSYCH: normal mood  Exam unchanged- 2023    RECENT LABS:  Hematology WBC   Date Value Ref Range Status   2023 4.54 3.40 - 10.80 10*3/mm3 Final     RBC   Date Value Ref Range Status   2023 3.53 (L) 4.14 - 5.80 10*6/mm3 Final     Hemoglobin   Date Value Ref Range Status   2023 11.9 (L) 13.0 - 17.7 g/dL Final     Hematocrit   Date Value Ref Range Status   2023 34.9 (L) 37.5 - 51.0 % Final     Platelets   Date Value Ref Range Status   2023 155 140 - 450 10*3/mm3 Final        Lab Results   Component Value Date    GLUCOSE 104 (H) 2022    BUN 15 2022    CREATININE 1.04 2022    EGFRIFAFRI 72 2021    BCR 14.4 2022    K 3.6 2022    CO2 25.4 2022    CALCIUM 8.9 2022    PROTENTOTREF 7.0 2022    ALBUMIN 3.90 2022    ALBUMIN 3.7 2022    LABIL2 1.2 2022    AST 27 2022    ALT 31 2022     Peripheral blood flow cytometry 3/12/18: No abnormal myeloid or lymphoid populations identified  B2M.8  FLC ratio 3.96-->4.55->5.64-->10.05-->6.32-->11.58-->7.6  M-spike 1.4 g/dL IgG kappa-->1.4 " g/dL-->1.2-->1.5-->1.6-->1.5-->1.6    Skeletal survey 3/12/18:  Negative.        Assessment & Plan     1.  Chronic leukopenia: The patient's ANC has run borderline low for a number of years.  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      Bone marrow exam was performed 1/11/19.  The bone marrow was mildly hypercellular 40% with involvement of a plasma cell dyscrasia 8% by aspirate and 15% by core biopsy.  Plasma cells were monoclonal kappa by flow cytometry.  There was trilineage hematopoiesis.  Congo red stain was negative for amyloid deposition.  Cytogenetics were normal; complete FISH panel could not be performed.  No dyspoiesis of the white cells noted.    4.  Smoldering myeloma:  He does have increased number of plasma cells by bone marrow biopsy 8% aspirate/15% core biopsy monoclonal kappa.  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  CBC today is stable.  CMP, SPEP LILLY and free light chain ratio are pending.  We will call him with results when available next week.     3.  Mild normocytic anemia:  the patient has stage II-III 3A chronic kidney disease as the likely cause.  Iron studies, B12, folic acid without obvious deficiency.  Hemoglobin currently stable 11.9    4.  Fatigue/decrease stamina:  In addition to above check TSH; pt to see pcp today to further evaluate

## 2023-05-12 ENCOUNTER — OFFICE VISIT (OUTPATIENT)
Dept: ONCOLOGY | Facility: CLINIC | Age: 83
End: 2023-05-12
Payer: MEDICARE

## 2023-05-12 ENCOUNTER — LAB (OUTPATIENT)
Dept: LAB | Facility: HOSPITAL | Age: 83
End: 2023-05-12
Payer: MEDICARE

## 2023-05-12 VITALS
HEIGHT: 69 IN | RESPIRATION RATE: 18 BRPM | OXYGEN SATURATION: 96 % | HEART RATE: 79 BPM | WEIGHT: 194.1 LBS | SYSTOLIC BLOOD PRESSURE: 137 MMHG | DIASTOLIC BLOOD PRESSURE: 73 MMHG | BODY MASS INDEX: 28.75 KG/M2 | TEMPERATURE: 97.5 F

## 2023-05-12 DIAGNOSIS — D47.2 SMOLDERING MYELOMA: ICD-10-CM

## 2023-05-12 DIAGNOSIS — D47.2 SMOLDERING MYELOMA: Primary | ICD-10-CM

## 2023-05-12 DIAGNOSIS — R53.83 OTHER FATIGUE: ICD-10-CM

## 2023-05-12 LAB
ALBUMIN SERPL-MCNC: 4.2 G/DL (ref 3.5–5.2)
ALBUMIN/GLOB SERPL: 1.2 G/DL (ref 1.1–2.4)
ALP SERPL-CCNC: 65 U/L (ref 38–116)
ALT SERPL W P-5'-P-CCNC: 31 U/L (ref 0–41)
ANION GAP SERPL CALCULATED.3IONS-SCNC: 9.9 MMOL/L (ref 5–15)
AST SERPL-CCNC: 32 U/L (ref 0–40)
BASOPHILS # BLD AUTO: 0.01 10*3/MM3 (ref 0–0.2)
BASOPHILS NFR BLD AUTO: 0.2 % (ref 0–1.5)
BILIRUB SERPL-MCNC: 0.9 MG/DL (ref 0.2–1.2)
BUN SERPL-MCNC: 18 MG/DL (ref 6–20)
BUN/CREAT SERPL: 15.5 (ref 7.3–30)
CALCIUM SPEC-SCNC: 9.1 MG/DL (ref 8.5–10.2)
CHLORIDE SERPL-SCNC: 105 MMOL/L (ref 98–107)
CO2 SERPL-SCNC: 24.1 MMOL/L (ref 22–29)
CREAT SERPL-MCNC: 1.16 MG/DL (ref 0.7–1.3)
DEPRECATED RDW RBC AUTO: 45.4 FL (ref 37–54)
EGFRCR SERPLBLD CKD-EPI 2021: 62.9 ML/MIN/1.73
EOSINOPHIL # BLD AUTO: 0.07 10*3/MM3 (ref 0–0.4)
EOSINOPHIL NFR BLD AUTO: 1.5 % (ref 0.3–6.2)
ERYTHROCYTE [DISTWIDTH] IN BLOOD BY AUTOMATED COUNT: 12.6 % (ref 12.3–15.4)
GLOBULIN UR ELPH-MCNC: 3.4 GM/DL (ref 1.8–3.5)
GLUCOSE SERPL-MCNC: 160 MG/DL (ref 74–124)
HCT VFR BLD AUTO: 34.9 % (ref 37.5–51)
HGB BLD-MCNC: 11.9 G/DL (ref 13–17.7)
IMM GRANULOCYTES # BLD AUTO: 0.01 10*3/MM3 (ref 0–0.05)
IMM GRANULOCYTES NFR BLD AUTO: 0.2 % (ref 0–0.5)
LYMPHOCYTES # BLD AUTO: 1.03 10*3/MM3 (ref 0.7–3.1)
LYMPHOCYTES NFR BLD AUTO: 22.7 % (ref 19.6–45.3)
MCH RBC QN AUTO: 33.7 PG (ref 26.6–33)
MCHC RBC AUTO-ENTMCNC: 34.1 G/DL (ref 31.5–35.7)
MCV RBC AUTO: 98.9 FL (ref 79–97)
MONOCYTES # BLD AUTO: 0.45 10*3/MM3 (ref 0.1–0.9)
MONOCYTES NFR BLD AUTO: 9.9 % (ref 5–12)
NEUTROPHILS NFR BLD AUTO: 2.97 10*3/MM3 (ref 1.7–7)
NEUTROPHILS NFR BLD AUTO: 65.5 % (ref 42.7–76)
NRBC BLD AUTO-RTO: 0 /100 WBC (ref 0–0.2)
PLATELET # BLD AUTO: 155 10*3/MM3 (ref 140–450)
PMV BLD AUTO: 9.3 FL (ref 6–12)
POTASSIUM SERPL-SCNC: 3.9 MMOL/L (ref 3.5–4.7)
PROT SERPL-MCNC: 7.6 G/DL (ref 6.3–8)
RBC # BLD AUTO: 3.53 10*6/MM3 (ref 4.14–5.8)
SODIUM SERPL-SCNC: 139 MMOL/L (ref 134–145)
TSH SERPL DL<=0.05 MIU/L-ACNC: 0.92 UIU/ML (ref 0.27–4.2)
WBC NRBC COR # BLD: 4.54 10*3/MM3 (ref 3.4–10.8)

## 2023-05-12 PROCEDURE — 1126F AMNT PAIN NOTED NONE PRSNT: CPT | Performed by: INTERNAL MEDICINE

## 2023-05-12 PROCEDURE — 36415 COLL VENOUS BLD VENIPUNCTURE: CPT

## 2023-05-12 PROCEDURE — 80053 COMPREHEN METABOLIC PANEL: CPT

## 2023-05-12 PROCEDURE — 85025 COMPLETE CBC W/AUTO DIFF WBC: CPT

## 2023-05-12 PROCEDURE — 99213 OFFICE O/P EST LOW 20 MIN: CPT | Performed by: INTERNAL MEDICINE

## 2023-05-12 PROCEDURE — 84443 ASSAY THYROID STIM HORMONE: CPT | Performed by: INTERNAL MEDICINE

## 2023-05-12 NOTE — LETTER
May 12, 2023       No Recipients    Patient: Antoine Story   YOB: 1940   Date of Visit: 5/12/2023       Dear Dr. Liriano Recipients:    Thank you for referring Antoine Story to me for evaluation. Below are the relevant portions of my assessment and plan of care.    If you have questions, please do not hesitate to call me. I look forward to following Antoine along with you.         Sincerely,        Josh Daigle MD        CC:   No Recipients    Josh Daigle MD  05/12/23 1117  Sign when Signing Visit    Subjective      REASON FOR FOLLOW-UP:  Chronic leukopenia, smoldering myeloma, anemia                               REQUESTING PHYSICIAN:  Eduardo    RECORDS OBTAINED:  Records of the patients history including those obtained from the referring provider were reviewed and summarized in detail.      History of Present Illness   This is a very pleasant 83-year-old man returning today for follow-up of smoldering myeloma.  The patient states he has not been feeling well for the past few weeks with decrease in his stamina although no specific pain, focal weakness, fever, chills etc.  He is not having chest pain or shortness of breath.  He states that when he goes to perform a task that he would normally complete without difficulty, he has to sit and rest.      Past Medical History:   Diagnosis Date   • Arthritis     hands   • CAD (coronary artery disease)    • COVID-19    • Diabetes    • GERD (gastroesophageal reflux disease)    • H/O MGUS (monoclonal gammopathy of unknown significance)    • History of colon polyps    • History of herpes zoster 2007   • History of pneumonia    • Hyperlipidemia    • Hypertension    • Neutropenia     H/O hereditary neutropenia   • Prostate cancer 2007   • Stroke 1997        Past Surgical History:   Procedure Laterality Date   • ANAL FISSURECTOMY     • CATARACT EXTRACTION Bilateral 2009   • COLONOSCOPY  06/2014    cecal polyp, sigmoid diverticulosis   • ENDOSCOPY  2001   • OTHER  SURGICAL HISTORY  2007    Decortication and right lower lobe with resection   • PROSTATE BIOPSY  2007   • THORACOSCOPY          Current Outpatient Medications on File Prior to Visit   Medication Sig Dispense Refill   • acetaminophen (TYLENOL) 500 MG tablet Take 1 tablet by mouth Every 6 (Six) Hours As Needed for Mild Pain . 100 tablet 0   • Alrex 0.2 % suspension SHAKE LIQUID AND INSTILL 1 DROP IN BOTH EYES FOUR TIMES DAILY     • amLODIPine (NORVASC) 2.5 MG tablet Take 1 tablet by mouth Daily.     • atorvastatin (LIPITOR) 40 MG tablet Daily.     • benzonatate (TESSALON) 100 MG capsule benzonatate 100 mg capsule   Take 1 capsule 3 times a day by oral route as needed.     • clopidogrel (PLAVIX) 75 MG tablet Take 1 tablet by mouth.     • cyclobenzaprine (FLEXERIL) 10 MG tablet Take 1 tablet by mouth 3 (Three) Times a Day As Needed for Muscle Spasms. 30 tablet 0   • diclofenac (VOLTAREN) 1 % gel gel Voltaren 1 % topical gel     • docusate sodium 100 MG capsule docusate sodium 100 mg capsule   Take 1 capsule every day by oral route.     • esomeprazole (nexIUM) 40 MG capsule Take 1 capsule by mouth Every Morning Before Breakfast.     • HYDROcodone-acetaminophen (NORCO) 5-325 MG per tablet Take 1 tablet by mouth Every 6 (Six) Hours As Needed.     • irbesartan-hydrochlorothiazide (AVALIDE) 300-12.5 MG tablet      • meloxicam (MOBIC) 7.5 MG tablet meloxicam 7.5 mg tablet   TAKE 1 TABLET BY MOUTH EVERY DAY WITH MEALS     • Multiple Vitamins-Minerals (MULTIVITAMIN ADULTS 50+ PO) Take  by mouth Daily.     • pregabalin (LYRICA) 50 MG capsule Take 1 capsule by mouth Daily.     • sildenafil (REVATIO) 20 MG tablet TK 2 TS PO QD PRN  3     No current facility-administered medications on file prior to visit.        ALLERGIES:  No Known Allergies     Social History     Socioeconomic History   • Marital status:      Spouse name: Candace   • Years of education: High school   Tobacco Use   • Smoking status: Former   Substance and  "Sexual Activity   • Alcohol use: Yes     Comment: Occasionally   • Drug use: No        Family History   Problem Relation Age of Onset   • Uterine cancer Mother    • Hypertension Other    • Hyperlipidemia Other    • Arthritis Other    • Stroke Other    • Heart disease Sister    • Hypertension Brother         Review of Systems   Constitutional: Positive for activity change and fatigue.   HENT: Negative.    Eyes: Negative.    Respiratory: Negative.    Cardiovascular: Negative.    Gastrointestinal: Negative.    Genitourinary: Negative.    Musculoskeletal: Positive for arthralgias.   Skin: Negative.    Allergic/Immunologic: Negative.    Neurological: Negative.    Hematological: Negative.    Psychiatric/Behavioral: Negative.          Objective      Vitals:    05/12/23 1037   BP: 137/73   Pulse: 79   Resp: 18   Temp: 97.5 °F (36.4 °C)   TempSrc: Temporal   SpO2: 96%   Weight: 88 kg (194 lb 1.6 oz)   Height: 175 cm (68.9\")   PainSc: 0-No pain         5/12/2023    10:42 AM   Current Status   ECOG score 0       Physical Exam    Con: pleasant, well-appearing man  HEENT: no icterus, no thrush, moist membranes  CV: RRR, S1S2  RESP: CTA bilat, no wheezing  GI: soft, nontender, no splenomegaly, +BS  MS: no edema, left knee in a brace  SKIN: no petechiae or bruising  NEURO: alert and oriented x3, normal strength, normal muscle tone  PSYCH: normal mood  Exam unchanged- 5/12/2023    RECENT LABS:  Hematology WBC   Date Value Ref Range Status   05/12/2023 4.54 3.40 - 10.80 10*3/mm3 Final     RBC   Date Value Ref Range Status   05/12/2023 3.53 (L) 4.14 - 5.80 10*6/mm3 Final     Hemoglobin   Date Value Ref Range Status   05/12/2023 11.9 (L) 13.0 - 17.7 g/dL Final     Hematocrit   Date Value Ref Range Status   05/12/2023 34.9 (L) 37.5 - 51.0 % Final     Platelets   Date Value Ref Range Status   05/12/2023 155 140 - 450 10*3/mm3 Final        Lab Results   Component Value Date    GLUCOSE 104 (H) 11/09/2022    BUN 15 11/09/2022    CREATININE " 1.04 2022    EGFRIFAFRI 72 2021    BCR 14.4 2022    K 3.6 2022    CO2 25.4 2022    CALCIUM 8.9 2022    PROTENTOTREF 7.0 2022    ALBUMIN 3.90 2022    ALBUMIN 3.7 2022    LABIL2 1.2 2022    AST 27 2022    ALT 31 2022     Peripheral blood flow cytometry 3/12/18: No abnormal myeloid or lymphoid populations identified  B2M.8  FLC ratio 3.96-->4.55->5.64-->10.05-->6.32-->11.58-->7.6  M-spike 1.4 g/dL IgG kappa-->1.4 g/dL-->1.2-->1.5-->1.6-->1.5-->1.6    Skeletal survey 3/12/18:  Negative.        Assessment & Plan     1.  Chronic leukopenia: The patient's ANC has run borderline low for a number of years.  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      Bone marrow exam was performed 19.  The bone marrow was mildly hypercellular 40% with involvement of a plasma cell dyscrasia 8% by aspirate and 15% by core biopsy.  Plasma cells were monoclonal kappa by flow cytometry.  There was trilineage hematopoiesis.  Congo red stain was negative for amyloid deposition.  Cytogenetics were normal; complete FISH panel could not be performed.  No dyspoiesis of the white cells noted.    4.  Smoldering myeloma:  He does have increased number of plasma cells by bone marrow biopsy 8% aspirate/15% core biopsy monoclonal kappa.  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  CBC today is stable.  CMP, SPEP LILLY and free light chain ratio are pending.  We will call him with results when available next week.     3.  Mild normocytic anemia:  the patient has stage II-III 3A chronic kidney disease as the likely cause.  Iron studies, B12, folic acid without obvious deficiency.  Hemoglobin currently stable 11.9    4.  Fatigue/decrease stamina:  In addition to above  check TSH; pt to see pcp today to further evaluate

## 2023-05-14 ENCOUNTER — APPOINTMENT (OUTPATIENT)
Dept: GENERAL RADIOLOGY | Facility: HOSPITAL | Age: 83
End: 2023-05-14
Payer: MEDICARE

## 2023-05-14 ENCOUNTER — HOSPITAL ENCOUNTER (OUTPATIENT)
Facility: HOSPITAL | Age: 83
Setting detail: OBSERVATION
Discharge: HOME | End: 2023-05-15
Attending: EMERGENCY MEDICINE | Admitting: INTERNAL MEDICINE
Payer: MEDICARE

## 2023-05-14 DIAGNOSIS — J98.01 COUGH DUE TO BRONCHOSPASM: ICD-10-CM

## 2023-05-14 DIAGNOSIS — J40 BRONCHITIS: Primary | ICD-10-CM

## 2023-05-14 DIAGNOSIS — Z86.79 HISTORY OF CORONARY ARTERY DISEASE: ICD-10-CM

## 2023-05-14 DIAGNOSIS — Z86.39 HISTORY OF DIABETES MELLITUS: ICD-10-CM

## 2023-05-14 LAB
ALBUMIN SERPL-MCNC: 4.2 G/DL (ref 3.5–5.2)
ALBUMIN/GLOB SERPL: 1.4 G/DL
ALP SERPL-CCNC: 63 U/L (ref 39–117)
ALT SERPL W P-5'-P-CCNC: 32 U/L (ref 1–41)
ANION GAP SERPL CALCULATED.3IONS-SCNC: 9 MMOL/L (ref 5–15)
AST SERPL-CCNC: 34 U/L (ref 1–40)
BASOPHILS # BLD AUTO: 0.01 10*3/MM3 (ref 0–0.2)
BASOPHILS NFR BLD AUTO: 0.5 % (ref 0–1.5)
BILIRUB SERPL-MCNC: 0.4 MG/DL (ref 0–1.2)
BUN SERPL-MCNC: 17 MG/DL (ref 8–23)
BUN/CREAT SERPL: 13.4 (ref 7–25)
CALCIUM SPEC-SCNC: 8.6 MG/DL (ref 8.6–10.5)
CHLORIDE SERPL-SCNC: 105 MMOL/L (ref 98–107)
CO2 SERPL-SCNC: 26 MMOL/L (ref 22–29)
CREAT SERPL-MCNC: 1.27 MG/DL (ref 0.76–1.27)
D-LACTATE SERPL-SCNC: 1.1 MMOL/L (ref 0.5–2)
DEPRECATED RDW RBC AUTO: 44.9 FL (ref 37–54)
EGFRCR SERPLBLD CKD-EPI 2021: 56.4 ML/MIN/1.73
EOSINOPHIL # BLD AUTO: 0.05 10*3/MM3 (ref 0–0.4)
EOSINOPHIL NFR BLD AUTO: 2.4 % (ref 0.3–6.2)
ERYTHROCYTE [DISTWIDTH] IN BLOOD BY AUTOMATED COUNT: 12.8 % (ref 12.3–15.4)
GEN 5 2HR TROPONIN T REFLEX: 28 NG/L
GLOBULIN UR ELPH-MCNC: 3 GM/DL
GLUCOSE SERPL-MCNC: 100 MG/DL (ref 65–99)
HCT VFR BLD AUTO: 30.8 % (ref 37.5–51)
HGB BLD-MCNC: 11 G/DL (ref 13–17.7)
IMM GRANULOCYTES # BLD AUTO: 0 10*3/MM3 (ref 0–0.05)
IMM GRANULOCYTES NFR BLD AUTO: 0 % (ref 0–0.5)
LYMPHOCYTES # BLD AUTO: 0.97 10*3/MM3 (ref 0.7–3.1)
LYMPHOCYTES NFR BLD AUTO: 47.1 % (ref 19.6–45.3)
MAGNESIUM SERPL-MCNC: 1.7 MG/DL (ref 1.6–2.4)
MCH RBC QN AUTO: 34.2 PG (ref 26.6–33)
MCHC RBC AUTO-ENTMCNC: 35.7 G/DL (ref 31.5–35.7)
MCV RBC AUTO: 95.7 FL (ref 79–97)
MONOCYTES # BLD AUTO: 0.38 10*3/MM3 (ref 0.1–0.9)
MONOCYTES NFR BLD AUTO: 18.4 % (ref 5–12)
NEUTROPHILS NFR BLD AUTO: 0.65 10*3/MM3 (ref 1.7–7)
NEUTROPHILS NFR BLD AUTO: 31.6 % (ref 42.7–76)
NRBC BLD AUTO-RTO: 0 /100 WBC (ref 0–0.2)
NT-PROBNP SERPL-MCNC: 45.7 PG/ML (ref 0–1800)
PLATELET # BLD AUTO: 125 10*3/MM3 (ref 140–450)
PMV BLD AUTO: 9.3 FL (ref 6–12)
POTASSIUM SERPL-SCNC: 3.9 MMOL/L (ref 3.5–5.2)
PROCALCITONIN SERPL-MCNC: 0.1 NG/ML (ref 0–0.25)
PROT SERPL-MCNC: 7.2 G/DL (ref 6–8.5)
QT INTERVAL: 370 MS
RBC # BLD AUTO: 3.22 10*6/MM3 (ref 4.14–5.8)
SODIUM SERPL-SCNC: 140 MMOL/L (ref 136–145)
TROPONIN T DELTA: -5 NG/L
TROPONIN T SERPL HS-MCNC: 33 NG/L
WBC NRBC COR # BLD: 2.06 10*3/MM3 (ref 3.4–10.8)

## 2023-05-14 PROCEDURE — 83605 ASSAY OF LACTIC ACID: CPT | Performed by: EMERGENCY MEDICINE

## 2023-05-14 PROCEDURE — 83735 ASSAY OF MAGNESIUM: CPT | Performed by: EMERGENCY MEDICINE

## 2023-05-14 PROCEDURE — 93005 ELECTROCARDIOGRAM TRACING: CPT

## 2023-05-14 PROCEDURE — 84145 PROCALCITONIN (PCT): CPT | Performed by: EMERGENCY MEDICINE

## 2023-05-14 PROCEDURE — 80053 COMPREHEN METABOLIC PANEL: CPT | Performed by: EMERGENCY MEDICINE

## 2023-05-14 PROCEDURE — 84484 ASSAY OF TROPONIN QUANT: CPT | Performed by: EMERGENCY MEDICINE

## 2023-05-14 PROCEDURE — 0202U NFCT DS 22 TRGT SARS-COV-2: CPT | Performed by: INTERNAL MEDICINE

## 2023-05-14 PROCEDURE — 94799 UNLISTED PULMONARY SVC/PX: CPT

## 2023-05-14 PROCEDURE — 99284 EMERGENCY DEPT VISIT MOD MDM: CPT

## 2023-05-14 PROCEDURE — 63710000001 PREDNISONE PER 1 MG: Performed by: EMERGENCY MEDICINE

## 2023-05-14 PROCEDURE — G0378 HOSPITAL OBSERVATION PER HR: HCPCS

## 2023-05-14 PROCEDURE — 36415 COLL VENOUS BLD VENIPUNCTURE: CPT

## 2023-05-14 PROCEDURE — 71045 X-RAY EXAM CHEST 1 VIEW: CPT

## 2023-05-14 PROCEDURE — 83880 ASSAY OF NATRIURETIC PEPTIDE: CPT | Performed by: EMERGENCY MEDICINE

## 2023-05-14 PROCEDURE — 93005 ELECTROCARDIOGRAM TRACING: CPT | Performed by: INTERNAL MEDICINE

## 2023-05-14 PROCEDURE — 94664 DEMO&/EVAL PT USE INHALER: CPT

## 2023-05-14 PROCEDURE — 85025 COMPLETE CBC W/AUTO DIFF WBC: CPT | Performed by: EMERGENCY MEDICINE

## 2023-05-14 PROCEDURE — 94640 AIRWAY INHALATION TREATMENT: CPT

## 2023-05-14 RX ORDER — ALBUTEROL SULFATE 2.5 MG/3ML
2.5 SOLUTION RESPIRATORY (INHALATION) ONCE
Status: COMPLETED | OUTPATIENT
Start: 2023-05-14 | End: 2023-05-14

## 2023-05-14 RX ORDER — SODIUM CHLORIDE 0.9 % (FLUSH) 0.9 %
10 SYRINGE (ML) INJECTION AS NEEDED
Status: DISCONTINUED | OUTPATIENT
Start: 2023-05-14 | End: 2023-05-15 | Stop reason: HOSPADM

## 2023-05-14 RX ORDER — AMLODIPINE BESYLATE 5 MG/1
2.5 TABLET ORAL DAILY
Status: DISCONTINUED | OUTPATIENT
Start: 2023-05-15 | End: 2023-05-15 | Stop reason: HOSPADM

## 2023-05-14 RX ORDER — LOSARTAN POTASSIUM 100 MG/1
100 TABLET ORAL
Status: DISCONTINUED | OUTPATIENT
Start: 2023-05-15 | End: 2023-05-15 | Stop reason: HOSPADM

## 2023-05-14 RX ORDER — HYDROCODONE BITARTRATE AND ACETAMINOPHEN 5; 325 MG/1; MG/1
1 TABLET ORAL EVERY 6 HOURS PRN
Status: DISCONTINUED | OUTPATIENT
Start: 2023-05-14 | End: 2023-05-15

## 2023-05-14 RX ORDER — PREGABALIN 100 MG/1
100 CAPSULE ORAL EVERY 12 HOURS SCHEDULED
Status: DISCONTINUED | OUTPATIENT
Start: 2023-05-14 | End: 2023-05-15 | Stop reason: HOSPADM

## 2023-05-14 RX ORDER — PREDNISOLONE ACETATE 10 MG/ML
1 SUSPENSION/ DROPS OPHTHALMIC EVERY 6 HOURS SCHEDULED
Status: DISCONTINUED | OUTPATIENT
Start: 2023-05-15 | End: 2023-05-15 | Stop reason: HOSPADM

## 2023-05-14 RX ORDER — SODIUM CHLORIDE 9 MG/ML
40 INJECTION, SOLUTION INTRAVENOUS AS NEEDED
Status: DISCONTINUED | OUTPATIENT
Start: 2023-05-14 | End: 2023-05-15 | Stop reason: HOSPADM

## 2023-05-14 RX ORDER — ENOXAPARIN SODIUM 100 MG/ML
40 INJECTION SUBCUTANEOUS EVERY 24 HOURS
Status: DISCONTINUED | OUTPATIENT
Start: 2023-05-15 | End: 2023-05-15 | Stop reason: HOSPADM

## 2023-05-14 RX ORDER — CLOPIDOGREL BISULFATE 75 MG/1
75 TABLET ORAL DAILY
Status: DISCONTINUED | OUTPATIENT
Start: 2023-05-15 | End: 2023-05-15 | Stop reason: HOSPADM

## 2023-05-14 RX ORDER — PREDNISONE 20 MG/1
40 TABLET ORAL
Status: DISCONTINUED | OUTPATIENT
Start: 2023-05-15 | End: 2023-05-15 | Stop reason: HOSPADM

## 2023-05-14 RX ORDER — PREDNISONE 20 MG/1
40 TABLET ORAL DAILY
Qty: 8 TABLET | Refills: 0 | Status: SHIPPED | OUTPATIENT
Start: 2023-05-14 | End: 2023-05-15 | Stop reason: HOSPADM

## 2023-05-14 RX ORDER — PREDNISONE 20 MG/1
40 TABLET ORAL ONCE
Status: COMPLETED | OUTPATIENT
Start: 2023-05-14 | End: 2023-05-14

## 2023-05-14 RX ORDER — BENZONATATE 100 MG/1
100 CAPSULE ORAL EVERY 4 HOURS PRN
Status: DISCONTINUED | OUTPATIENT
Start: 2023-05-14 | End: 2023-05-15 | Stop reason: HOSPADM

## 2023-05-14 RX ORDER — PANTOPRAZOLE SODIUM 40 MG/1
40 TABLET, DELAYED RELEASE ORAL
Status: DISCONTINUED | OUTPATIENT
Start: 2023-05-15 | End: 2023-05-15 | Stop reason: HOSPADM

## 2023-05-14 RX ORDER — IPRATROPIUM BROMIDE AND ALBUTEROL SULFATE 2.5; .5 MG/3ML; MG/3ML
3 SOLUTION RESPIRATORY (INHALATION) EVERY 4 HOURS PRN
Status: DISCONTINUED | OUTPATIENT
Start: 2023-05-14 | End: 2023-05-15 | Stop reason: HOSPADM

## 2023-05-14 RX ORDER — MULTIPLE VITAMINS W/ MINERALS TAB 9MG-400MCG
1 TAB ORAL DAILY
Status: DISCONTINUED | OUTPATIENT
Start: 2023-05-15 | End: 2023-05-15 | Stop reason: HOSPADM

## 2023-05-14 RX ORDER — DOCUSATE SODIUM 100 MG/1
100 CAPSULE, LIQUID FILLED ORAL DAILY
Status: DISCONTINUED | OUTPATIENT
Start: 2023-05-15 | End: 2023-05-15 | Stop reason: HOSPADM

## 2023-05-14 RX ORDER — HYDROCHLOROTHIAZIDE 12.5 MG/1
12.5 TABLET ORAL
Status: DISCONTINUED | OUTPATIENT
Start: 2023-05-15 | End: 2023-05-15 | Stop reason: HOSPADM

## 2023-05-14 RX ORDER — ACETAMINOPHEN 500 MG
500 TABLET ORAL EVERY 6 HOURS PRN
Status: DISCONTINUED | OUTPATIENT
Start: 2023-05-14 | End: 2023-05-15 | Stop reason: HOSPADM

## 2023-05-14 RX ORDER — ATORVASTATIN CALCIUM 20 MG/1
40 TABLET, FILM COATED ORAL DAILY
Status: DISCONTINUED | OUTPATIENT
Start: 2023-05-15 | End: 2023-05-15 | Stop reason: HOSPADM

## 2023-05-14 RX ORDER — SODIUM CHLORIDE 0.9 % (FLUSH) 0.9 %
10 SYRINGE (ML) INJECTION EVERY 12 HOURS SCHEDULED
Status: DISCONTINUED | OUTPATIENT
Start: 2023-05-14 | End: 2023-05-15 | Stop reason: HOSPADM

## 2023-05-14 RX ORDER — NITROGLYCERIN 0.4 MG/1
0.4 TABLET SUBLINGUAL
Status: DISCONTINUED | OUTPATIENT
Start: 2023-05-14 | End: 2023-05-15 | Stop reason: HOSPADM

## 2023-05-14 RX ADMIN — ALBUTEROL SULFATE 2.5 MG: 2.5 SOLUTION RESPIRATORY (INHALATION) at 18:54

## 2023-05-14 RX ADMIN — PREDNISONE 40 MG: 20 TABLET ORAL at 18:56

## 2023-05-14 RX ADMIN — Medication 10 ML: at 23:30

## 2023-05-14 NOTE — DISCHARGE INSTRUCTIONS
Complete course of steroids as prescribed, use up your inhaler every 4 hours for the next 24 hours and then every 4 hours as needed for wheezing or shortness of breath continue all other current medications, close follow-up with your primary care provider this week for recheck, ED return for worsening symptoms as needed.

## 2023-05-14 NOTE — ED NOTES
Pt to ER from home for SOA, cough, and wheezing that started a few days ago. Pt saw MD Clark last week and told pt to come to ER if it worsened.    This RN in appropriate PPE while in pt room. Pt wearing mask

## 2023-05-14 NOTE — ED PROVIDER NOTES
EMERGENCY DEPARTMENT ENCOUNTER    Room Number:  18/18  Date of encounter:  5/14/2023  PCP: Mg Clark MD  Historian: Patient      HPI:  Chief Complaint: Shortness of breath, wheezing  A complete HPI/ROS/PMH/PSH/SH/FH are unobtainable due to: None    Context: Antoine Story is a 83 y.o. male who presents to the ED via private vehicle for evaluation for 1 to 2 weeks of progressive shortness of breath and wheezing.  Has been on Claritin without relief.  Has had a mild cough, no fevers or chills, no chest pains, no vomiting or diarrhea.  No peripheral edema.  Active smoker.  No prior diagnosis of COPD or asthma.  Remote history of pneumonia.      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   • Other neutropenia 03/12/2018   • Anemia in stage 3 chronic kidney disease 04/16/2018   • Mild renal insufficiency 06/26/2020     Resolved Ambulatory Problems     Diagnosis Date Noted   • No Resolved Ambulatory Problems     Past Medical History:   Diagnosis Date   • Arthritis    • CAD (coronary artery disease)    • COVID-19    • Diabetes    • GERD (gastroesophageal reflux disease)    • H/O MGUS (monoclonal gammopathy of unknown significance)    • History of colon polyps    • History of herpes zoster 2007   • History of pneumonia    • Hyperlipidemia    • Hypertension    • Neutropenia    • Prostate cancer 2007   • Stroke 1997         PAST SURGICAL HISTORY  Past Surgical History:   Procedure Laterality Date   • ANAL FISSURECTOMY     • CATARACT EXTRACTION Bilateral 2009   • COLONOSCOPY  06/2014    cecal polyp, sigmoid diverticulosis   • ENDOSCOPY  2001   • OTHER SURGICAL HISTORY  2007    Decortication and right lower lobe with resection   • PROSTATE BIOPSY  2007   • THORACOSCOPY           FAMILY HISTORY  Family History   Problem Relation Age of Onset   •  Uterine cancer Mother    • Hypertension Other    • Hyperlipidemia Other    • Arthritis Other    • Stroke Other    • Heart disease Sister    • Hypertension Brother          SOCIAL HISTORY  Social History     Socioeconomic History   • Marital status:      Spouse name: Candace   • Years of education: High school   Tobacco Use   • Smoking status: Former   Substance and Sexual Activity   • Alcohol use: Yes     Comment: Occasionally   • Drug use: No         ALLERGIES  Patient has no known allergies.        REVIEW OF SYSTEMS  Review of Systems     All systems reviewed and negative except for those discussed in HPI.       PHYSICAL EXAM    I have reviewed the triage vital signs and nursing notes.    ED Triage Vitals   Temp Heart Rate Resp BP SpO2   05/14/23 1649 05/14/23 1648 05/14/23 1648 05/14/23 1657 05/14/23 1648   98 °F (36.7 °C) 75 20 150/70 97 %      Temp src Heart Rate Source Patient Position BP Location FiO2 (%)   05/14/23 1649 05/14/23 1648 05/14/23 1657 05/14/23 1657 --   Tympanic Monitor Sitting Left arm        Physical Exam  General: No acute distress, nontoxic  HEENT: Mucous membranes moist, atraumatic, EOMI  Neck: Full ROM  Pulm: Symmetric chest rise, nonlabored, decent air movement bilaterally, trace and expiratory wheezes bilateral lower lobes posteriorly  Cardiovascular: Regular rate and rhythm, intact distal pulses, no peripheral edema  GI: Soft, nontender, nondistended, no rebound, no guarding, bowel sounds present  MSK: Full ROM, no deformity  Skin: Warm, dry  Neuro: Awake, alert, oriented x 4, GCS 15, moving all extremities, no focal deficits  Psych: Calm, cooperative        LAB RESULTS  Recent Results (from the past 24 hour(s))   ECG 12 Lead Dyspnea    Collection Time: 05/14/23  4:54 PM   Result Value Ref Range    QT Interval 370 ms   Comprehensive Metabolic Panel    Collection Time: 05/14/23  5:39 PM    Specimen: Blood   Result Value Ref Range    Glucose 100 (H) 65 - 99 mg/dL    BUN 17 8 - 23  mg/dL    Creatinine 1.27 0.76 - 1.27 mg/dL    Sodium 140 136 - 145 mmol/L    Potassium 3.9 3.5 - 5.2 mmol/L    Chloride 105 98 - 107 mmol/L    CO2 26.0 22.0 - 29.0 mmol/L    Calcium 8.6 8.6 - 10.5 mg/dL    Total Protein 7.2 6.0 - 8.5 g/dL    Albumin 4.2 3.5 - 5.2 g/dL    ALT (SGPT) 32 1 - 41 U/L    AST (SGOT) 34 1 - 40 U/L    Alkaline Phosphatase 63 39 - 117 U/L    Total Bilirubin 0.4 0.0 - 1.2 mg/dL    Globulin 3.0 gm/dL    A/G Ratio 1.4 g/dL    BUN/Creatinine Ratio 13.4 7.0 - 25.0    Anion Gap 9.0 5.0 - 15.0 mmol/L    eGFR 56.4 (L) >60.0 mL/min/1.73   BNP    Collection Time: 05/14/23  5:39 PM    Specimen: Blood   Result Value Ref Range    proBNP 45.7 0.0 - 1,800.0 pg/mL   High Sensitivity Troponin T    Collection Time: 05/14/23  5:39 PM    Specimen: Blood   Result Value Ref Range    HS Troponin T 33 (H) <15 ng/L   Lactic Acid, Plasma    Collection Time: 05/14/23  5:39 PM    Specimen: Blood   Result Value Ref Range    Lactate 1.1 0.5 - 2.0 mmol/L   Procalcitonin    Collection Time: 05/14/23  5:39 PM    Specimen: Blood   Result Value Ref Range    Procalcitonin 0.10 0.00 - 0.25 ng/mL   Magnesium    Collection Time: 05/14/23  5:39 PM    Specimen: Blood   Result Value Ref Range    Magnesium 1.7 1.6 - 2.4 mg/dL   CBC Auto Differential    Collection Time: 05/14/23  5:39 PM    Specimen: Blood   Result Value Ref Range    WBC 2.06 (L) 3.40 - 10.80 10*3/mm3    RBC 3.22 (L) 4.14 - 5.80 10*6/mm3    Hemoglobin 11.0 (L) 13.0 - 17.7 g/dL    Hematocrit 30.8 (L) 37.5 - 51.0 %    MCV 95.7 79.0 - 97.0 fL    MCH 34.2 (H) 26.6 - 33.0 pg    MCHC 35.7 31.5 - 35.7 g/dL    RDW 12.8 12.3 - 15.4 %    RDW-SD 44.9 37.0 - 54.0 fl    MPV 9.3 6.0 - 12.0 fL    Platelets 125 (L) 140 - 450 10*3/mm3    Neutrophil % 31.6 (L) 42.7 - 76.0 %    Lymphocyte % 47.1 (H) 19.6 - 45.3 %    Monocyte % 18.4 (H) 5.0 - 12.0 %    Eosinophil % 2.4 0.3 - 6.2 %    Basophil % 0.5 0.0 - 1.5 %    Immature Grans % 0.0 0.0 - 0.5 %    Neutrophils, Absolute 0.65 (L) 1.70 -  7.00 10*3/mm3    Lymphocytes, Absolute 0.97 0.70 - 3.10 10*3/mm3    Monocytes, Absolute 0.38 0.10 - 0.90 10*3/mm3    Eosinophils, Absolute 0.05 0.00 - 0.40 10*3/mm3    Basophils, Absolute 0.01 0.00 - 0.20 10*3/mm3    Immature Grans, Absolute 0.00 0.00 - 0.05 10*3/mm3    nRBC 0.0 0.0 - 0.2 /100 WBC       Ordered the above labs and independently interpreted results. My findings will be discussed in the medical decision making section below        RADIOLOGY  XR Chest 1 View    Result Date: 5/14/2023  EXAMINATION: SINGLE VIEW CHEST RADIOGRAPH  HISTORY: 83-year-old male with a history of dyspnea  FINDINGS: An upright AP chest radiograph was obtained. Comparison is made to a chest radiograph dated 06/26/2020. The lungs are normal in volume and are clear. Calcified right paratracheal lymph nodes are noted. The cardiac silhouette is within normal limits. Visualized osseous structures have a normal appearance.      There is no evidence for an active or acute abnormality of the chest.        Ordered the above noted radiological studies.  Independently interpreted by me and my independent review of findings can be found in the ED Course.  See dictation for official radiology interpretation.      PROCEDURES    Procedures  EKG - Per my independent interpretation:    EKG Time: 1654  Rhythm/Rate: Sinus rhythm with rate of 66  Normal axis  Normal intervals  Borderline nonspecific T wave abnormalities   No STEMI       No emergent changes compared to July 15, 2015      MEDICATIONS GIVEN IN ER    Medications   predniSONE (DELTASONE) tablet 40 mg (has no administration in time range)   albuterol sulfate HFA (PROVENTIL HFA;VENTOLIN HFA;PROAIR HFA) inhaler 2 puff (has no administration in time range)         PROGRESS, DATA ANALYSIS, CONSULTS, AND MEDICAL DECISION MAKING    Please note that this section constitutes my independent interpretation of clinical data including lab results, radiology, EKG's.  This constitutes my independent  professional opinion regarding differential diagnosis and management of this patient.  It may include any factors such as history from outside sources, review of external records, social determinants of health, management of medications, response to those treatments, and discussions with other providers.    Differential Diagnosis and Plan: Initial concern for viral process, pneumonia, COPD, asthma, bronchitis, electrolyte abnormalities, anemia, arrhythmia, renal failure, heart failure, among others.  Plan for labs, chest x-ray, EKG, and reevaluation with results.  He is overall very well-appearing with normal vital signs.    Additional sources:  - Discussed/ obtained information from independent historians:       - Chronic or social conditions impacting care:      - Shared decision making:  Patient fully updated on and in agreement with the course and plan moving forward    ED Course as of 05/16/23 1448   Sun May 14, 2023   1740 XR Chest 1 View  Per my independent interpretation of the chest x-ray, no evidence of pneumothorax [DC]   1758 XR Chest 1 View  Radiology report reviewed, no evidence of any acute emergent findings [DC]   1758 WBC(!): 2.06  4.5 two days ago [DC]   1758 Hemoglobin(!): 11.0 [DC]   1758 Platelets(!): 125  155 two days ago [DC]   1815 Glucose(!): 100 [DC]   1815 BUN: 17 [DC]   1815 Creatinine: 1.27 [DC]   1815 Sodium: 140 [DC]   1815 Potassium: 3.9 [DC]   1815 Magnesium: 1.7 [DC]   1815 Lactate: 1.1 [DC]   1815 proBNP: 45.7 [DC]   1815 HS Troponin T(!): 33 [DC]   1815 proBNP: 45.7 [DC]   1815 Procalcitonin: 0.10 [DC]   1830 Discussed with Dr. Clark, PCP, discussed patient's clinical course advisedly, treatment modalities, and plan for discharge assuming that his second set troponin comes back reassuring.  We will place him on 5 days of prednisone and an albuterol inhaler and he will follow-up with him later this week. [DC]   2107 Discussed with Dr. Stephane Choi who is familiar with patient, aware  of his presentation, he is aware that patient's troponin level delta was -5 on second assessment.  He reports he would like to come and evaluate the patient to determine course going forward.  He request patient be kept in the ER for his evaluation. [TO]   2144 Dr. Nevarez has seen and examined the patient, reports he like to admit the patient for observation overnight and further evaluation treatment as needed. [TO]      ED Course User Index  [DC] Vic Holliday MD  [TO] Layla Mcfarland MD       Hospitalization Considered?: yes    Orders Placed During This Visit:  Orders Placed This Encounter   Procedures   • XR Chest 1 View   • Comprehensive Metabolic Panel   • BNP   • High Sensitivity Troponin T   • Lactic Acid, Plasma   • Procalcitonin   • Magnesium   • CBC Auto Differential   • High Sensitivity Troponin T 2Hr   • Family Medicine Consult   • ECG 12 Lead Dyspnea   • CBC & Differential       Additional orders considered but not placed:    Independent interpretation of labs, radiology studies, and discussions with consultants: See ED Course        AS OF 18:49 EDT VITALS:    BP - 124/93  HR - 67  TEMP - 98 °F (36.7 °C) (Tympanic)  02 SATS - 98%        DIAGNOSIS  Final diagnoses:   Bronchitis   History of diabetes mellitus   History of coronary artery disease         DISPOSITION  HOSPITALIZATION    Discussed treatment plan and reason for hospitalization with pt/family and hospitalizing physician.  Pt/family voiced understanding of the plan for hospitalization for further testing/treatment as needed.                   --    Please note that portions of this were completed with a voice recognition program.       Note Disclaimer: At [facility], we believe that sharing information builds trust and better relationships. You are receiving this note because you are receiving care at [facility] or recently visited. It is possible you will see health information before a provider has talked with you about it. This kind  of information can be easy to misunderstand. To help you fully understand what it means for your health, we urge you to discuss this note with your provider.         Vic Holliday MD  05/16/23 4553

## 2023-05-15 ENCOUNTER — APPOINTMENT (OUTPATIENT)
Dept: CARDIOLOGY | Facility: HOSPITAL | Age: 83
End: 2023-05-15
Payer: MEDICARE

## 2023-05-15 VITALS
HEIGHT: 71 IN | HEART RATE: 83 BPM | TEMPERATURE: 98.3 F | DIASTOLIC BLOOD PRESSURE: 69 MMHG | SYSTOLIC BLOOD PRESSURE: 145 MMHG | WEIGHT: 188 LBS | BODY MASS INDEX: 26.32 KG/M2 | OXYGEN SATURATION: 95 % | RESPIRATION RATE: 16 BRPM

## 2023-05-15 LAB
ALBUMIN SERPL ELPH-MCNC: 3.7 G/DL (ref 2.9–4.4)
ALBUMIN/GLOB SERPL: 1.2 {RATIO} (ref 0.7–1.7)
ALPHA1 GLOB SERPL ELPH-MCNC: 0.2 G/DL (ref 0–0.4)
ALPHA2 GLOB SERPL ELPH-MCNC: 0.5 G/DL (ref 0.4–1)
ANION GAP SERPL CALCULATED.3IONS-SCNC: 9 MMOL/L (ref 5–15)
B PARAPERT DNA SPEC QL NAA+PROBE: NOT DETECTED
B PERT DNA SPEC QL NAA+PROBE: NOT DETECTED
B-GLOBULIN SERPL ELPH-MCNC: 0.8 G/DL (ref 0.7–1.3)
BASOPHILS # BLD AUTO: 0.01 10*3/MM3 (ref 0–0.2)
BASOPHILS NFR BLD AUTO: 0.2 % (ref 0–1.5)
BH CV ECHO MEAS - ACS: 2.16 CM
BH CV ECHO MEAS - AO MAX PG: 4.5 MMHG
BH CV ECHO MEAS - AO MEAN PG: 2.22 MMHG
BH CV ECHO MEAS - AO ROOT DIAM: 3.6 CM
BH CV ECHO MEAS - AO V2 MAX: 106 CM/SEC
BH CV ECHO MEAS - AO V2 VTI: 20 CM
BH CV ECHO MEAS - AVA(I,D): 2.8 CM2
BH CV ECHO MEAS - EDV(CUBED): 87.4 ML
BH CV ECHO MEAS - EDV(MOD-SP2): 55 ML
BH CV ECHO MEAS - EDV(MOD-SP4): 65 ML
BH CV ECHO MEAS - EF(MOD-BP): 56.6 %
BH CV ECHO MEAS - EF(MOD-SP2): 61.8 %
BH CV ECHO MEAS - EF(MOD-SP4): 56.9 %
BH CV ECHO MEAS - ESV(CUBED): 26.7 ML
BH CV ECHO MEAS - ESV(MOD-SP2): 21 ML
BH CV ECHO MEAS - ESV(MOD-SP4): 28 ML
BH CV ECHO MEAS - FS: 32.7 %
BH CV ECHO MEAS - IVS/LVPW: 1 CM
BH CV ECHO MEAS - IVSD: 0.93 CM
BH CV ECHO MEAS - LAT PEAK E' VEL: 6.5 CM/SEC
BH CV ECHO MEAS - LV DIASTOLIC VOL/BSA (35-75): 31.6 CM2
BH CV ECHO MEAS - LV MASS(C)D: 135.3 GRAMS
BH CV ECHO MEAS - LV MAX PG: 3.4 MMHG
BH CV ECHO MEAS - LV MEAN PG: 1.37 MMHG
BH CV ECHO MEAS - LV SYSTOLIC VOL/BSA (12-30): 13.6 CM2
BH CV ECHO MEAS - LV V1 MAX: 92.1 CM/SEC
BH CV ECHO MEAS - LV V1 VTI: 18.2 CM
BH CV ECHO MEAS - LVIDD: 4.4 CM
BH CV ECHO MEAS - LVIDS: 3 CM
BH CV ECHO MEAS - LVOT AREA: 3 CM2
BH CV ECHO MEAS - LVOT DIAM: 1.96 CM
BH CV ECHO MEAS - LVPWD: 0.93 CM
BH CV ECHO MEAS - MED PEAK E' VEL: 6 CM/SEC
BH CV ECHO MEAS - MV A DUR: 0.15 SEC
BH CV ECHO MEAS - MV A MAX VEL: 63.8 CM/SEC
BH CV ECHO MEAS - MV DEC SLOPE: 318 CM/SEC2
BH CV ECHO MEAS - MV DEC TIME: 0.27 MSEC
BH CV ECHO MEAS - MV E MAX VEL: 53.1 CM/SEC
BH CV ECHO MEAS - MV E/A: 0.83
BH CV ECHO MEAS - MV MAX PG: 3.7 MMHG
BH CV ECHO MEAS - MV MEAN PG: 1.58 MMHG
BH CV ECHO MEAS - MV P1/2T: 71.2 MSEC
BH CV ECHO MEAS - MV V2 VTI: 21.7 CM
BH CV ECHO MEAS - MVA(P1/2T): 3.1 CM2
BH CV ECHO MEAS - MVA(VTI): 2.5 CM2
BH CV ECHO MEAS - PA ACC TIME: 0.12 SEC
BH CV ECHO MEAS - PA PR(ACCEL): 25.5 MMHG
BH CV ECHO MEAS - PA V2 MAX: 107.2 CM/SEC
BH CV ECHO MEAS - PULM A REVS DUR: 0.13 SEC
BH CV ECHO MEAS - PULM A REVS VEL: 32.6 CM/SEC
BH CV ECHO MEAS - PULM DIAS VEL: 40.7 CM/SEC
BH CV ECHO MEAS - PULM S/D: 1.26
BH CV ECHO MEAS - PULM SYS VEL: 51.4 CM/SEC
BH CV ECHO MEAS - RAP SYSTOLE: 3 MMHG
BH CV ECHO MEAS - RV MAX PG: 4 MMHG
BH CV ECHO MEAS - RV V1 MAX: 100.3 CM/SEC
BH CV ECHO MEAS - RV V1 VTI: 20.5 CM
BH CV ECHO MEAS - RVSP: 29 MMHG
BH CV ECHO MEAS - SI(MOD-SP2): 16.6 ML/M2
BH CV ECHO MEAS - SI(MOD-SP4): 18 ML/M2
BH CV ECHO MEAS - SV(LVOT): 54.9 ML
BH CV ECHO MEAS - SV(MOD-SP2): 34 ML
BH CV ECHO MEAS - SV(MOD-SP4): 37 ML
BH CV ECHO MEAS - TAPSE (>1.6): 2.7 CM
BH CV ECHO MEAS - TR MAX PG: 26.9 MMHG
BH CV ECHO MEAS - TR MAX VEL: 259.4 CM/SEC
BH CV ECHO MEASUREMENTS AVERAGE E/E' RATIO: 8.5
BH CV XLRA - RV BASE: 3.5 CM
BH CV XLRA - RV LENGTH: 4.6 CM
BH CV XLRA - TDI S': 12.9 CM/SEC
BUN SERPL-MCNC: 19 MG/DL (ref 8–23)
BUN/CREAT SERPL: 16.5 (ref 7–25)
C PNEUM DNA NPH QL NAA+NON-PROBE: NOT DETECTED
CALCIUM SPEC-SCNC: 8.7 MG/DL (ref 8.6–10.5)
CHLORIDE SERPL-SCNC: 106 MMOL/L (ref 98–107)
CO2 SERPL-SCNC: 24 MMOL/L (ref 22–29)
CREAT SERPL-MCNC: 1.15 MG/DL (ref 0.76–1.27)
DEPRECATED RDW RBC AUTO: 45.2 FL (ref 37–54)
EGFRCR SERPLBLD CKD-EPI 2021: 63.5 ML/MIN/1.73
EOSINOPHIL # BLD AUTO: 0 10*3/MM3 (ref 0–0.4)
EOSINOPHIL NFR BLD AUTO: 0 % (ref 0.3–6.2)
ERYTHROCYTE [DISTWIDTH] IN BLOOD BY AUTOMATED COUNT: 12.8 % (ref 12.3–15.4)
FLUAV SUBTYP SPEC NAA+PROBE: NOT DETECTED
FLUBV RNA ISLT QL NAA+PROBE: NOT DETECTED
GAMMA GLOB SERPL ELPH-MCNC: 1.8 G/DL (ref 0.4–1.8)
GLOBULIN SER-MCNC: 3.3 G/DL (ref 2.2–3.9)
GLUCOSE SERPL-MCNC: 155 MG/DL (ref 65–99)
HADV DNA SPEC NAA+PROBE: NOT DETECTED
HCOV 229E RNA SPEC QL NAA+PROBE: NOT DETECTED
HCOV HKU1 RNA SPEC QL NAA+PROBE: NOT DETECTED
HCOV NL63 RNA SPEC QL NAA+PROBE: NOT DETECTED
HCOV OC43 RNA SPEC QL NAA+PROBE: NOT DETECTED
HCT VFR BLD AUTO: 32.3 % (ref 37.5–51)
HGB BLD-MCNC: 11.5 G/DL (ref 13–17.7)
HMPV RNA NPH QL NAA+NON-PROBE: NOT DETECTED
HPIV1 RNA ISLT QL NAA+PROBE: NOT DETECTED
HPIV2 RNA SPEC QL NAA+PROBE: NOT DETECTED
HPIV3 RNA NPH QL NAA+PROBE: DETECTED
HPIV4 P GENE NPH QL NAA+PROBE: NOT DETECTED
IGA SERPL-MCNC: 23 MG/DL (ref 61–437)
IGG SERPL-MCNC: 1985 MG/DL (ref 603–1613)
IGM SERPL-MCNC: 40 MG/DL (ref 15–143)
IMM GRANULOCYTES # BLD AUTO: 0.04 10*3/MM3 (ref 0–0.05)
IMM GRANULOCYTES NFR BLD AUTO: 0.8 % (ref 0–0.5)
INTERPRETATION SERPL IEP-IMP: ABNORMAL
KAPPA LC FREE SER-MCNC: 48.8 MG/L (ref 3.3–19.4)
KAPPA LC FREE/LAMBDA FREE SER: 7.28 {RATIO} (ref 0.26–1.65)
LABORATORY COMMENT REPORT: ABNORMAL
LAMBDA LC FREE SERPL-MCNC: 6.7 MG/L (ref 5.7–26.3)
LEFT ATRIUM VOLUME INDEX: 18.1 ML/M2
LYMPHOCYTES # BLD AUTO: 0.66 10*3/MM3 (ref 0.7–3.1)
LYMPHOCYTES NFR BLD AUTO: 13.6 % (ref 19.6–45.3)
M PNEUMO IGG SER IA-ACNC: NOT DETECTED
M PROTEIN SERPL ELPH-MCNC: 1.7 G/DL
MAXIMAL PREDICTED HEART RATE: 138 BPM
MCH RBC QN AUTO: 34.2 PG (ref 26.6–33)
MCHC RBC AUTO-ENTMCNC: 35.6 G/DL (ref 31.5–35.7)
MCV RBC AUTO: 96.1 FL (ref 79–97)
MONOCYTES # BLD AUTO: 0.29 10*3/MM3 (ref 0.1–0.9)
MONOCYTES NFR BLD AUTO: 6 % (ref 5–12)
NEUTROPHILS NFR BLD AUTO: 3.84 10*3/MM3 (ref 1.7–7)
NEUTROPHILS NFR BLD AUTO: 79.4 % (ref 42.7–76)
NRBC BLD AUTO-RTO: 0 /100 WBC (ref 0–0.2)
PLATELET # BLD AUTO: 130 10*3/MM3 (ref 140–450)
PMV BLD AUTO: 10 FL (ref 6–12)
POTASSIUM SERPL-SCNC: 4.1 MMOL/L (ref 3.5–5.2)
PROT SERPL-MCNC: 7 G/DL (ref 6–8.5)
QT INTERVAL: 374 MS
QT INTERVAL: 385 MS
RBC # BLD AUTO: 3.36 10*6/MM3 (ref 4.14–5.8)
RHINOVIRUS RNA SPEC NAA+PROBE: NOT DETECTED
RSV RNA NPH QL NAA+NON-PROBE: NOT DETECTED
SARS-COV-2 RNA NPH QL NAA+NON-PROBE: NOT DETECTED
SINUS: 2.9 CM
SODIUM SERPL-SCNC: 139 MMOL/L (ref 136–145)
STJ: 3.3 CM
STRESS TARGET HR: 117 BPM
TROPONIN T SERPL HS-MCNC: 19 NG/L
WBC NRBC COR # BLD: 4.84 10*3/MM3 (ref 3.4–10.8)

## 2023-05-15 PROCEDURE — 93306 TTE W/DOPPLER COMPLETE: CPT

## 2023-05-15 PROCEDURE — G0378 HOSPITAL OBSERVATION PER HR: HCPCS

## 2023-05-15 PROCEDURE — 96372 THER/PROPH/DIAG INJ SC/IM: CPT

## 2023-05-15 PROCEDURE — 80048 BASIC METABOLIC PNL TOTAL CA: CPT | Performed by: INTERNAL MEDICINE

## 2023-05-15 PROCEDURE — 63710000001 PREDNISONE PER 1 MG: Performed by: INTERNAL MEDICINE

## 2023-05-15 PROCEDURE — 25010000002 ENOXAPARIN PER 10 MG: Performed by: INTERNAL MEDICINE

## 2023-05-15 PROCEDURE — 93005 ELECTROCARDIOGRAM TRACING: CPT | Performed by: INTERNAL MEDICINE

## 2023-05-15 PROCEDURE — 84484 ASSAY OF TROPONIN QUANT: CPT | Performed by: INTERNAL MEDICINE

## 2023-05-15 PROCEDURE — 85025 COMPLETE CBC W/AUTO DIFF WBC: CPT | Performed by: INTERNAL MEDICINE

## 2023-05-15 RX ORDER — HYDROCODONE BITARTRATE AND ACETAMINOPHEN 5; 325 MG/1; MG/1
1 TABLET ORAL EVERY 8 HOURS PRN
Status: DISCONTINUED | OUTPATIENT
Start: 2023-05-15 | End: 2023-05-15 | Stop reason: HOSPADM

## 2023-05-15 RX ORDER — ALBUTEROL SULFATE 90 UG/1
2 AEROSOL, METERED RESPIRATORY (INHALATION) EVERY 4 HOURS PRN
Qty: 1 G | Refills: 0 | Status: SHIPPED | OUTPATIENT
Start: 2023-05-15

## 2023-05-15 RX ORDER — HYDROCODONE POLISTIREX AND CHLORPHENIRAMINE POLISTIREX 10; 8 MG/5ML; MG/5ML
5 SUSPENSION, EXTENDED RELEASE ORAL EVERY 12 HOURS PRN
Status: DISCONTINUED | OUTPATIENT
Start: 2023-05-15 | End: 2023-05-15 | Stop reason: HOSPADM

## 2023-05-15 RX ORDER — HYDROCODONE POLISTIREX AND CHLORPHENIRAMINE POLISTIREX 10; 8 MG/5ML; MG/5ML
5 SUSPENSION, EXTENDED RELEASE ORAL EVERY 12 HOURS PRN
Qty: 120 ML | Refills: 0 | Status: SHIPPED | OUTPATIENT
Start: 2023-05-15 | End: 2023-05-22

## 2023-05-15 RX ORDER — HYDROCODONE POLISTIREX AND CHLORPHENIRAMINE POLISTIREX 10; 8 MG/5ML; MG/5ML
5 SUSPENSION, EXTENDED RELEASE ORAL EVERY 12 HOURS PRN
Qty: 120 ML | Refills: 0 | Status: SHIPPED | OUTPATIENT
Start: 2023-05-15 | End: 2023-05-15 | Stop reason: SDUPTHER

## 2023-05-15 RX ORDER — PREDNISONE 20 MG/1
40 TABLET ORAL
Qty: 6 TABLET | Refills: 0 | Status: SHIPPED | OUTPATIENT
Start: 2023-05-16 | End: 2023-05-19

## 2023-05-15 RX ADMIN — AMLODIPINE BESYLATE 2.5 MG: 5 TABLET ORAL at 08:26

## 2023-05-15 RX ADMIN — ENOXAPARIN SODIUM 40 MG: 100 INJECTION SUBCUTANEOUS at 08:26

## 2023-05-15 RX ADMIN — HYDROCODONE POLISTIREX AND CHLORPHENIRAMINE POLISTIREX 5 ML: 10; 8 SUSPENSION, EXTENDED RELEASE ORAL at 08:59

## 2023-05-15 RX ADMIN — BENZONATATE 100 MG: 100 CAPSULE ORAL at 04:58

## 2023-05-15 RX ADMIN — PREGABALIN 100 MG: 100 CAPSULE ORAL at 08:39

## 2023-05-15 RX ADMIN — PREDNISOLONE ACETATE 1 DROP: 10 SUSPENSION/ DROPS OPHTHALMIC at 06:54

## 2023-05-15 RX ADMIN — DOCUSATE SODIUM 100 MG: 100 CAPSULE, LIQUID FILLED ORAL at 08:26

## 2023-05-15 RX ADMIN — PANTOPRAZOLE SODIUM 40 MG: 40 TABLET, DELAYED RELEASE ORAL at 06:54

## 2023-05-15 RX ADMIN — Medication 10 ML: at 08:29

## 2023-05-15 RX ADMIN — MULTIPLE VITAMINS W/ MINERALS TAB 1 TABLET: TAB at 08:26

## 2023-05-15 RX ADMIN — CLOPIDOGREL BISULFATE 75 MG: 75 TABLET, FILM COATED ORAL at 08:26

## 2023-05-15 RX ADMIN — HYDROCHLOROTHIAZIDE 12.5 MG: 12.5 TABLET ORAL at 08:26

## 2023-05-15 RX ADMIN — PREDNISOLONE ACETATE 1 DROP: 10 SUSPENSION/ DROPS OPHTHALMIC at 02:15

## 2023-05-15 RX ADMIN — BENZONATATE 100 MG: 100 CAPSULE ORAL at 11:30

## 2023-05-15 RX ADMIN — LOSARTAN POTASSIUM 100 MG: 100 TABLET, FILM COATED ORAL at 08:26

## 2023-05-15 RX ADMIN — PREDNISONE 40 MG: 20 TABLET ORAL at 08:26

## 2023-05-15 RX ADMIN — ATORVASTATIN CALCIUM 40 MG: 20 TABLET, FILM COATED ORAL at 08:26

## 2023-05-15 RX ADMIN — PREDNISOLONE ACETATE 1 DROP: 10 SUSPENSION/ DROPS OPHTHALMIC at 11:27

## 2023-05-15 NOTE — ED NOTES
"This RN entered room and patient was taking oral medication that had not been ordered by admitting physician or ED physician. This RN requested to know what patient had already taken. Pt states \"it's on my med list, you all should know.\" This RN stated that only medications ordered by a physician and given to the patient would show in his chart as having been taken. Pt stated he took a 5mg Hydrocodone-acetaminophen and a 100mg Pregabalin.    "

## 2023-05-15 NOTE — DISCHARGE SUMMARY
Date of admission: May 14, 2023  Date of discharge: May 15, 2023    Discharge diagnosis:  1.  High sensitive troponin elevation with a delta of 5.  I do not believe this represents ischemia of the myocardium.  This is  related to stress from his respiratory process in combination with his multiple metabolic comorbidities  2.    Parainfluenza  virus 3 upper respiratory infection with associated reactive airway  3.  Essential hypertension  4.  Hyperlipidemia  5.  Prediabetes  6.  Osteoarthritis  7.  Obesity  8.  Ethnic neutropenia  9.   Smoldering myeloma    Consults obtained during this admission:  None    Procedures performed during this admission:  None    History of present illness:  This very nice 83-year-old gentleman.  He has a history of hypertension, hyperlipidemia, prediabetes, obesity, distant prostate cancer, cervical spondylosis, postherpetic neuralgia, smoldering myeloma, GERD and obesity.  Patient has been seen by me a couple times over the past week or so.  First he has had 1 week of head congestion chest congestion and cough.  The cough is been nonproductive.  Patient has had no fever or chills.  He has had no myalgias.  He has been very weak and tired.     Patient has been evaluated by me twice the last time being 2 days to admission.  We have tested him for COVID-19.  This was negative.  He had no findings to suggest any significant systemic process.  He was treated conservatively for a viral URI.  He was also found to have a slight normocytic anemia.  This is being evaluated.     The patient states over the next 2 days his breathing is gotten worse.  He had  wheezing he became short of breath with minimal activity.  He had no chest pain no palpitations.  No leg swelling.  No PND orthopnea.    No weight change. He has had no fever or chills.  He still been able to do ADLs.  He presented to the emergency room.  He was stable.  He was not hypoxemic.  He was not in acute distress.  His lab studies  "including chest x-ray and EKG were all unremarkable.  His high sensitive troponin was elevated.  The initial reading was 33 and a another study done 2 hours later was 28.  The patient was admitted for observation.     It should be noted that the patient's wife was diagnosed and treated for COVID-19 about 1 month ago.  The patient was tested twice since her diagnosis and both times was negative    Physical exam at the time of discharge:  /74 (BP Location: Right arm, Patient Position: Lying)   Pulse 76   Temp 98.6 °F (37 °C) (Oral)   Resp 16   Ht 180.3 cm (71\")   Wt 85.6 kg (188 lb 11.2 oz)   SpO2 96%   BMI 26.32 kg/m²   Appearance: Elderly -American gentleman who is coughing but in no distress  HEENT: Normocephalic atraumatic.  Pulses round and equal.  Sinuses nontender.  Pharynx clear.  Neck: Without lymphadenopathy JVD or thyromegaly  Heart: Regular rate and rhythm.  Lungs: Clear to auscultation percussion  Abdomen: Benign  Extremities: Without edema clubbing or cyanosis.  Neurologic: Nonfocal.    Significant laboratory data:  Troponin 33.  2 hours later it was 28 the delta was -5..  The next morning troponin was 19  proBNP 45  CMP was unremarkable.  Lactate 1.1  Magnesium 1.7  Procalcitonin 0.10  White count 2.06, hemoglobin 11, MCV 95, RDW 12.8, platelet 125  Single view chest x-ray no active disease  EKG x2: Sinus rhythm ventricular rate was 66, 1 PAC, nonspecific ST-T wave changes EKG was done admission and the next day.    Echocardiogram:  Left Ventricle Left ventricular systolic function is normal. Calculated left ventricular EF = 56.6%   Septal wall motion is normal. Normal left ventricular cavity size and wall thickness noted. All left ventricular wall segments contract normally. Left ventricular diastolic function was normal. Normal left atrial pressure. No evidence of left ventricular thrombus or mass present.   Right Ventricle Normal right ventricular cavity size, wall thickness, " systolic function and septal motion noted.   Left Atrium Normal left atrial size and volume noted.   Right Atrium Normal right atrial cavity size noted.   Aortic Valve The aortic valve is grossly normal in structure. The aortic valve appears trileaflet. No significant aortic valve regurgitation is present. No hemodynamically significant aortic valve stenosis is present.   Mitral Valve The mitral valve is grossly normal in structure. Trace to mild mitral valve regurgitation is present. No significant mitral valve stenosis is present.   Tricuspid Valve The tricuspid valve is grossly normal in structure. Trace to mild tricuspid valve regurgitation is present. No evidence of pulmonary hypertension is present. No evidence of significant tricuspid valve stenosis is present.   Pulmonic Valve The pulmonic valve is grossly normal in structure. There is no significant pulmonic valve regurgitation present. There is no pulmonic valve stenosis present.   Greater Vessels No dilation of the aortic root is present. No dilation of the sinuses of Valsalva is present.   Pericardium The pericardium is normal. There is no evidence of pericardial effusion. .     Hospital course:  The patient was admitted.  He was started on prednisone 40 mg a day.  He was given duo nebs.  He was given first Tessalon and then Tussionex for his cough.  Patient did well he had no shortness of breath or wheezing after admission but had a persistent cough.    Patient was found to have a mild troponin elevation.  Patient had no physis just ischemia related to his heart.  The above evaluation was performed.  The troponin elevation is related to the patient's multiple comorbidities plus the stress of his current pulmonary infection.    The patient is being discharged on May 15.  His activities are ad jeremi.  He will eat a healthy heart low sugar diet.    Medications at time of discharge:  Atorvastatin 40 mg a day  Tussionex Pennkinetic 5 mg every 12 as  needed  Plavix 75 mg a day  Dulcolax 5 mg 2 p.o. daily as needed  Colace 100 mg p.o. daily  Sildenafil 20 mg daily as needed  Norco 5/325 every 6 hours as needed.  He will take this no more than twice a day if he is using his cough medicine  Prednisone 40 mg a day  Proventil HFA 2 puffs every 4 hours as needed  Avalide 300/12.51 a day  Multivitamins daily  Nexium 40 mg daily  Lyrica 100 twice daily    Patient will come back to my office to see me within a week.  He will call me if he has any problems

## 2023-05-15 NOTE — PLAN OF CARE
Goal Outcome Evaluation:  Plan of Care Reviewed With: patient      Progress: no change  Outcome Evaluation: New admit tonight. Up ad jeremi. CC diet. VSS. Oriented x 4. RA. COVID negative, but positive for parainfluenza 3 virus. SR on the monitor. No complaints. Plan for ECHO tomorrow.

## 2023-05-15 NOTE — PROGRESS NOTES
History:  The very nice 83-year-old gentleman was admitted last night with cough, congestion and elevated troponin with a delta of 5.  Patient is EKG was unremarkable.  He is having no chest pain or signs of instability.  The patient was diagnosed as having viral respiratory syndrome with bronchospasms.  He was given prednisone and given something for his cough.    This morning the patient is doing good.  He still has significant cough.  Otherwise he feels fine.    Allergies  Patient has no known allergies.      Current Facility-Administered Medications:   •  acetaminophen (TYLENOL) tablet 500 mg, 500 mg, Oral, Q6H PRN, Mg Clark MD  •  amLODIPine (NORVASC) tablet 2.5 mg, 2.5 mg, Oral, Daily, Mg Clark MD  •  atorvastatin (LIPITOR) tablet 40 mg, 40 mg, Oral, Daily, Mg Clark MD  •  benzonatate (TESSALON) capsule 100 mg, 100 mg, Oral, Q4H PRN, Mg Clark MD, 100 mg at 05/15/23 0458  •  clopidogrel (PLAVIX) tablet 75 mg, 75 mg, Oral, Daily, Mg Clark MD  •  docusate sodium (COLACE) capsule 100 mg, 100 mg, Oral, Daily, Mg Clark MD  •  Enoxaparin Sodium (LOVENOX) syringe 40 mg, 40 mg, Subcutaneous, Q24H, Mg Clark MD  •  losartan (COZAAR) tablet 100 mg, 100 mg, Oral, Q24H **AND** hydroCHLOROthiazide (HYDRODIURIL) tablet 12.5 mg, 12.5 mg, Oral, Q24H, Mg Clark MD  •  Hydrocod Dyllan-Chlorphe Dyllan ER (TUSSIONEX PENNKINETIC) 10-8 MG/5ML ER suspension 5 mL, 5 mL, Oral, Q12H PRN, Mg Clark MD  •  HYDROcodone-acetaminophen (NORCO) 5-325 MG per tablet 1 tablet, 1 tablet, Oral, Q8H PRN, Mg Clark MD  •  ipratropium-albuterol (DUO-NEB) nebulizer solution 3 mL, 3 mL, Nebulization, Q4H PRN, Mg Clark MD  •  multivitamin with minerals 1 tablet, 1 tablet, Oral, Daily, Mg Clark MD  •  nitroglycerin (NITROSTAT) SL tablet 0.4 mg, 0.4 mg, Sublingual, Q5 Min PRN, Mg Clark MD  •  pantoprazole (PROTONIX) EC tablet 40 mg, 40 mg, Oral, Q AM, Mg Clark MD, 40 mg at 05/15/23  "0654  •  prednisoLONE acetate (PRED FORTE) 1 % ophthalmic suspension 1 drop, 1 drop, Both Eyes, Q6H, Mg Clark MD, 1 drop at 05/15/23 0654  •  predniSONE (DELTASONE) tablet 40 mg, 40 mg, Oral, Daily With Breakfast, Mg Clark MD  •  pregabalin (LYRICA) capsule 100 mg, 100 mg, Oral, Q12H, Mg Clark MD  •  sodium chloride 0.9 % flush 10 mL, 10 mL, Intravenous, Q12H, Mg Clark MD, 10 mL at 05/14/23 2330  •  sodium chloride 0.9 % flush 10 mL, 10 mL, Intravenous, PRN, Mg Clark MD  •  sodium chloride 0.9 % infusion 40 mL, 40 mL, Intravenous, PRN, Mg Clark MD    /74 (BP Location: Right arm, Patient Position: Lying)   Pulse 76   Temp 98.6 °F (37 °C) (Oral)   Resp 16   Ht 180.3 cm (71\")   Wt 85.6 kg (188 lb 11.2 oz)   SpO2 96%   BMI 26.32 kg/m²     Physical Exam  Appearance: Elderly -American gentleman who is coughing but in no distress  HEENT: Normocephalic atraumatic.  Pulses round and equal.  Sinuses nontender.  Pharynx clear.  Neck: Without lymphadenopathy JVD or thyromegaly  Heart: Regular rate and rhythm.  Lungs: Clear to auscultation percussion  Abdomen: Benign  Extremities: Without edema clubbing or cyanosis.  Neurologic: Nonfocal.    Lab Results (last 24 hours)     Procedure Component Value Units Date/Time    CBC Auto Differential [[id number]]  (Abnormal) Collected: 05/15/23 0643    Specimen: Blood Updated: 05/15/23 0709     WBC 4.84 10*3/mm3      RBC 3.36 10*6/mm3      Hemoglobin 11.5 g/dL      Hematocrit 32.3 %      MCV 96.1 fL      MCH 34.2 pg      MCHC 35.6 g/dL      RDW 12.8 %      RDW-SD 45.2 fl      MPV 10.0 fL      Platelets 130 10*3/mm3      Neutrophil % 79.4 %      Lymphocyte % 13.6 %      Monocyte % 6.0 %      Eosinophil % 0.0 %      Basophil % 0.2 %      Immature Grans % 0.8 %      Neutrophils, Absolute 3.84 10*3/mm3      Lymphocytes, Absolute 0.66 10*3/mm3      Monocytes, Absolute 0.29 10*3/mm3      Eosinophils, Absolute 0.00 10*3/mm3      Basophils, " Absolute 0.01 10*3/mm3      Immature Grans, Absolute 0.04 10*3/mm3      nRBC 0.0 /100 WBC     Basic Metabolic Panel [[id number]] Collected: 05/15/23 0643    Specimen: Blood Updated: 05/15/23 0651    High Sensitivity Troponin T [[id number]] Collected: 05/15/23 0643    Specimen: Blood Updated: 05/15/23 0651    Respiratory Panel PCR w/COVID-19(SARS-CoV-2) GIL/CORY/JOVANA/PAD/COR/MAD/ELIZABETH In-House, NP Swab in UTM/VTM, 3-4 HR TAT - Swab, Nasopharynx [[id number]]  (Abnormal) Collected: 05/14/23 2334    Specimen: Swab from Nasopharynx Updated: 05/15/23 0039     ADENOVIRUS, PCR Not Detected     Coronavirus 229E Not Detected     Coronavirus HKU1 Not Detected     Coronavirus NL63 Not Detected     Coronavirus OC43 Not Detected     COVID19 Not Detected     Human Metapneumovirus Not Detected     Human Rhinovirus/Enterovirus Not Detected     Influenza A PCR Not Detected     Influenza B PCR Not Detected     Parainfluenza Virus 1 Not Detected     Parainfluenza Virus 2 Not Detected     Parainfluenza Virus 3 Detected     Parainfluenza Virus 4 Not Detected     RSV, PCR Not Detected     Bordetella pertussis pcr Not Detected     Bordetella parapertussis PCR Not Detected     Chlamydophila pneumoniae PCR Not Detected     Mycoplasma pneumo by PCR Not Detected    Narrative:      In the setting of a positive respiratory panel with a viral infection PLUS a negative procalcitonin without other underlying concern for bacterial infection, consider observing off antibiotics or discontinuation of antibiotics and continue supportive care. If the respiratory panel is positive for atypical bacterial infection (Bordetella pertussis, Chlamydophila pneumoniae, or Mycoplasma pneumoniae), consider antibiotic de-escalation to target atypical bacterial infection.    High Sensitivity Troponin T 2Hr [[id number]]  (Abnormal) Collected: 05/14/23 1959    Specimen: Blood Updated: 05/14/23 2024     HS Troponin T 28 ng/L      Troponin T Delta -5 ng/L     Narrative:       "High Sensitive Troponin T Reference Range:  <10.0 ng/L- Negative Female for AMI  <15.0 ng/L- Negative Male for AMI  >=10 - Abnormal Female indicating possible myocardial injury.  >=15 - Abnormal Male indicating possible myocardial injury.   Clinicians would have to utilize clinical acumen, EKG, Troponin, and serial changes to determine if it is an Acute Myocardial Infarction or myocardial injury due to an underlying chronic condition.         High Sensitivity Troponin T [[id number]]  (Abnormal) Collected: 05/14/23 1739    Specimen: Blood Updated: 05/14/23 1814     HS Troponin T 33 ng/L     Narrative:      High Sensitive Troponin T Reference Range:  <10.0 ng/L- Negative Female for AMI  <15.0 ng/L- Negative Male for AMI  >=10 - Abnormal Female indicating possible myocardial injury.  >=15 - Abnormal Male indicating possible myocardial injury.   Clinicians would have to utilize clinical acumen, EKG, Troponin, and serial changes to determine if it is an Acute Myocardial Infarction or myocardial injury due to an underlying chronic condition.         Procalcitonin [[id number]]  (Normal) Collected: 05/14/23 1739    Specimen: Blood Updated: 05/14/23 1814     Procalcitonin 0.10 ng/mL     Narrative:      As a Marker for Sepsis (Non-Neonates):    1. <0.5 ng/mL represents a low risk of severe sepsis and/or septic shock.  2. >2 ng/mL represents a high risk of severe sepsis and/or septic shock.    As a Marker for Lower Respiratory Tract Infections that require antibiotic therapy:    PCT on Admission    Antibiotic Therapy       6-12 Hrs later    >0.5                Strongly Recommended  >0.25 - <0.5        Recommended   0.1 - 0.25          Discouraged              Remeasure/reassess PCT  <0.1                Strongly Discouraged     Remeasure/reassess PCT    As 28 day mortality risk marker: \"Change in Procalcitonin Result\" (>80% or <=80%) if Day 0 (or Day 1) and Day 4 values are available. Refer to " [email or web address]    Change in PCT <=80%  A decrease of PCT levels below or equal to 80% defines a positive change in PCT test result representing a higher risk for 28-day all-cause mortality of patients diagnosed with severe sepsis for septic shock.    Change in PCT >80%  A decrease of PCT levels of more than 80% defines a negative change in PCT result representing a lower risk for 28-day all-cause mortality of patients diagnosed with severe sepsis or septic shock.       BNP [[id number]]  (Normal) Collected: 05/14/23 1739    Specimen: Blood Updated: 05/14/23 1814     proBNP 45.7 pg/mL     Narrative:      Among patients with dyspnea, NT-proBNP is highly sensitive for the detection of acute congestive heart failure. In addition NT-proBNP of <300 pg/ml effectively rules out acute congestive heart failure with 99% negative predictive value.    Results may be falsely decreased if patient taking Biotin.      Comprehensive Metabolic Panel [[id number]]  (Abnormal) Collected: 05/14/23 1739    Specimen: Blood Updated: 05/14/23 1807     Glucose 100 mg/dL      BUN 17 mg/dL      Creatinine 1.27 mg/dL      Sodium 140 mmol/L      Potassium 3.9 mmol/L      Chloride 105 mmol/L      CO2 26.0 mmol/L      Calcium 8.6 mg/dL      Total Protein 7.2 g/dL      Albumin 4.2 g/dL      ALT (SGPT) 32 U/L      AST (SGOT) 34 U/L      Alkaline Phosphatase 63 U/L      Total Bilirubin 0.4 mg/dL      Globulin 3.0 gm/dL      A/G Ratio 1.4 g/dL      BUN/Creatinine Ratio 13.4     Anion Gap 9.0 mmol/L      eGFR 56.4 mL/min/1.73     Narrative:      GFR Normal >60  Chronic Kidney Disease <60  Kidney Failure <15    The GFR formula is only valid for adults with stable renal function between ages 18 and 70.    Magnesium [[id number]]  (Normal) Collected: 05/14/23 1739    Specimen: Blood Updated: 05/14/23 1807     Magnesium 1.7 mg/dL     Lactic Acid, Plasma [[id number]]  (Normal) Collected: 05/14/23 1739    Specimen: Blood Updated: 05/14/23 1805      Lactate 1.1 mmol/L     CBC & Differential [[id number]]  (Abnormal) Collected: 05/14/23 1739    Specimen: Blood Updated: 05/14/23 1748    Narrative:      The following orders were created for panel order CBC & Differential.  Procedure                               Abnormality         Status                     ---------                               -----------         ------                     CBC Auto Differential[[id number]]        Abnormal            Final result                 Please view results for these tests on the individual orders.    CBC Auto Differential [[id number]]  (Abnormal) Collected: 05/14/23 1739    Specimen: Blood Updated: 05/14/23 1748     WBC 2.06 10*3/mm3      RBC 3.22 10*6/mm3      Hemoglobin 11.0 g/dL      Hematocrit 30.8 %      MCV 95.7 fL      MCH 34.2 pg      MCHC 35.7 g/dL      RDW 12.8 %      RDW-SD 44.9 fl      MPV 9.3 fL      Platelets 125 10*3/mm3      Neutrophil % 31.6 %      Lymphocyte % 47.1 %      Monocyte % 18.4 %      Eosinophil % 2.4 %      Basophil % 0.5 %      Immature Grans % 0.0 %      Neutrophils, Absolute 0.65 10*3/mm3      Lymphocytes, Absolute 0.97 10*3/mm3      Monocytes, Absolute 0.38 10*3/mm3      Eosinophils, Absolute 0.05 10*3/mm3      Basophils, Absolute 0.01 10*3/mm3      Immature Grans, Absolute 0.00 10*3/mm3      nRBC 0.0 /100 WBC         EKG this morning: Sinus rhythm PACs nonspecific T wave changes    Respiratory PCR panel: For parainfluenza virus 3    Imp:  1.  High sensitive troponin elevation with a delta of 5.  I do not believe this represents ischemia of the myocardium.  This is likely related to stress from his respiratory process in combination with his multiple metabolic comorbidities  2.    Parainfluenza  virus 3 upper respiratory infection with associated reactive airway  3.  Essential hypertension  4.  Hyperlipidemia  5.  Prediabetes  6.  Osteoarthritis  7.  Obesity  8.  Ethnic neutropenia  9.  Normocytic anemia        Plan:  Check this  patient's I sensitive troponin again this morning.  We will check an echocardiogram.  The patient was given Tussionex for his cough.  We will continue the DuoNebs and corticosteroids.  Hopefully I can discharge this patient this afternoon.    Mg Clark MD  5/15/2023  07:18 EDT

## 2023-05-15 NOTE — PROGRESS NOTES
"[facility] Clinical Pharmacy Services: Enoxaparin Consult    Antoine Story has a pharmacy consult to dose enoxaparin per Dr Mg Clark's request.     Indication: Prophylaxis  Home Anticoagulation:       Relevant clinical data and objective history reviewed:  83 y.o. male 180.3 cm (71\") 85.6 kg (188 lb 11.2 oz)   Body mass index is 26.32 kg/m².  Estimated Creatinine Clearance: 54.3 mL/min (by C-G formula based on SCr of 1.27 mg/dL).    Assessment/Plan    Will start patient on 40mg subcutaneous every 24 hours, adjusted for renal function. Consult order will be discontinued but pharmacy will continue to follow.     Crow Munroe [facility]  Clinical Pharmacist      "

## 2023-05-15 NOTE — CASE MANAGEMENT/SOCIAL WORK
Discharge Planning Assessment  [facility]     Patient Name: Antoine Story  MRN: [id number]  Today's Date: 5/15/2023    Admit Date: 5/14/2023    Plan: Return home with spouse   Discharge Needs Assessment     Row Name 05/15/23 1345       Living Environment    People in Home spouse    Name(s) of People in Home moriah Maria [phone]    Current Living Arrangements home    Primary Care Provided by self    Provides Primary Care For no one    Family Caregiver if Needed spouse    Family Caregiver Names Wife Candace [phone]    Quality of Family Relationships helpful    Able to Return to Prior Arrangements yes       Resource/Environmental Concerns    Resource/Environmental Concerns none    Transportation Concerns none       Food Insecurity    Within the past 12 months, you worried that your food would run out before you got the money to buy more. Never true    Within the past 12 months, the food you bought just didn't last and you didn't have money to get more. Never true       Transition Planning    Patient/Family Anticipates Transition to home with family    Patient/Family Anticipated Services at Transition none    Transportation Anticipated family or friend will provide       Discharge Needs Assessment    Readmission Within the Last 30 Days no previous admission in last 30 days    Equipment Currently Used at Home none    Concerns to be Addressed denies needs/concerns at this time    Anticipated Changes Related to Illness none    Equipment Needed After Discharge none               Discharge Plan     Row Name 05/15/23 6398       Plan    Plan Return home with spouse    Patient/Family in Agreement with Plan yes    Plan Comments Spoke with patient and wife Candace [phone] at bedside.  Patient is IADL, uses no DME, has never used HH or been to SNF.  PCP is Dr. Mg Clark and pharmacy is [facility] @ [facility].  Patient drives, wife drives and can assist if needed.  He plans to return home at [place].   CCP will follow as needed.  Neda FAIR              Continued Care and Services - Admitted Since 5/14/2023    Coordination has not been started for this encounter.       Expected Discharge Date and Time     Expected Discharge Date Expected Discharge Time    May 15, 2023          Demographic Summary     Row Name 05/15/23 1344       General Information    Admission Type observation    Arrived From home    Referral Source admission list    Reason for Consult discharge planning    Preferred Language English               Functional Status     Row Name 05/15/23 1345       Functional Status    Usual Activity Tolerance moderate    Current Activity Tolerance moderate       Functional Status, IADL    Medications independent    Meal Preparation assistive person  Wife    Housekeeping assistive person    Laundry assistive person    Shopping assistive person;independent       Mental Status    General Appearance WDL WDL       Mental Status Summary    Recent Changes in Mental Status/Cognitive Functioning no changes                      Becky S. [place], RN

## 2023-05-15 NOTE — H&P
Date of admission: May 14, 2023    Chief complaint:  Wheezing and shortness of air    History of present illness:  This very nice 83-year-old gentleman.  He has a history of hypertension, hyperlipidemia, prediabetes, obesity, distant prostate cancer, cervical spondylosis, postherpetic neuralgia, MGUS, GERD and obesity.  Patient has been seen by me a couple times over the past week or so.  First he has had 1 week of head congestion chest congestion and cough.  The cough is been nonproductive.  Patient has had no fever or chills.  He has had no myalgias.  He has been very weak and tired.    Patient has been evaluated by me twice the last time being 2 days ago.  We have tested him for COVID-19.  This was negative.  He had no findings to suggest any significant systemic process.  He was treated conservatively for a viral URI.  He was also found to have a slight normocytic anemia.  This is being evaluated.    The patient states over the past 2 days his breathing is gotten worse.  He has been wheezing he gets short of breath with minimal activity.  He has had no chest pain no palpitations.  No leg swelling.  No PND orthopnea.  His weight has been the same.  He has had no fever or chills.  He still been able to do ADLs.  He presented to the emergency room.  He was stable.  He was not hypoxemic.  He was not in acute distress.  His lab studies including chest x-ray and EKG were all unremarkable.  His high sensitive troponin was elevated.  The initial reading was 33 and a another study done 2 hours later was 28.  The patient is being admitted for observation.    It should be noted that the patient's wife was diagnosed and treated for COVID-19 about 1 month ago.  The patient was tested twice since her diagnosis and both times was negative    Past medical history:     Allergies: Neurontin, Prozac, Singulair     Current medications:  Atorvastatin 40 mg a day  Tessalon Perles 100 mg as needed  Plavix 75 mg a day  Dulcolax 5 mg 2  p.o. daily as needed  Colace 100 mg p.o. daily  Sildenafil 20 mg daily as needed  Norco 5/325 every 6 hours as needed  Avalide 300/12.51 a day  Multivitamins daily  Nexium 40 mg daily  Lyrica 100 twice daily     Medical problems:  Right middle cerebral artery CVA   BPH with benign hematuria  Bilateral carpal tunnel  Prediabetes  Familial neutropenia  Prostate cancer 2007 Dorian 3 treated with external beam radiation  Hyperlipidemia  Cervical spondylosis  Postherpetic neuralgia, herpes zoster , right shoulder region  Colon polyp .  The patient has yet to schedule his repeat colonoscopy  Essential hypertension  GERD  COVID-2020  Monoclonal gammopathy of uncertain significance  Osteoarthritis, most significant joints are the knees    Surgical history:  Prostate biopsy   Cataract surgery      Up-to-date on TD AP, influenza, Pneumovax, Prevnar and Zostavax.  Patient has not had Shingrix or hepatitis A vaccination.  Patient has had his initial set of COVID and 2 boosters.  He did not get the bivalent booster     Family history:  Father  age 37 motor vehicle accident.  Mother  age 58 uterine cancer.  Patient had 8 siblings.  5 are .  Significant history hypertension hyperlipidemia CVA atherosclerotic crevasse disease and prostate cancer arthritis.  Patient has 3 children.  All are in their 50s.  1 of his sons has some form of heart disease.     Social history:  Patient never smoked.  Patient occasionally drinks an alcoholic drink.  Patient is  and lives with his wife.  She is a nurse.  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     Review of systems:  General: No fevers or chills.  No night sweats.  Weight has been stable.  Energy has been poor.  The patient is sleeping okay.  Neurologic: No headaches.    Patient has a fullness in his ears.  No weakness in the arms or legs.  Memory good.  Chronic paresthesias right shoulder  area  Cardiovascular: No chest pain, see HPI  Respiratory: See HPI  GI: See HPI no abdominal pain or difficulty swallowing.  : Unremarkable.  Night 6 times in the day and once at night  Dermatologic: Unremarkable.  Musculoskeletal: Chronic neck pain.  Some stiffness in his hands  Psychiatric: Unremarkable.     Physical exam:  Signs: Blood pressure 153/78, pulse 64, respirate 18, temperature 98.0, O2 sat 96% on room air, weight 190 pounds  Appearance: Normally appearing 83-year-old gentleman.  He is in no distress.  He is pleasant, alert and oriented  HEENT: Normocephalic atraumatic.  Pupils round and equal.  Pharynx clear.  Patient wearing a mask.  Neck: Decreased range of motion.  No lymphadenopathy JVD or thyromegaly.  Chest: No chest wall tenderness.    Breath sounds were clear.  No wheezing or prolongation of the expiratory phase a few faint rhonchi in the bases  Heart: PMI nondisplaced.  Regular rate and rhythm.  No murmur, gallop or rub.  Abdomen: Protuberant with a moderate fat pad.  Normal active bowel sounds.  No rebound tenderness or guarding.  Extremities: Without clubbing, cyanosis or edema.  No calf tenderness no significant muscle atrophy  Neurologic: Cranial nerves intact, nonfocal    Significant laboratory studies:  Troponin 33.  2 hours later it was 28 the delta was -5.  proBNP 45  CMP was unremarkable.  Lactate 1.1  Magnesium 1.7  Procalcitonin 0.10  White count 2.06, hemoglobin 11, MCV 95, RDW 12.8, platelet 125  Single view chest x-ray no active disease  EKG: Sinus rhythm ventricular rate was 66, 1 PAC, nonspecific ST-T wave changes    Impression:  1.  High sensitive troponin elevation with a delta of 5.  I do not believe this represents ischemia of the myocardium.  This is likely related to stress from his respiratory process in combination with his multiple metabolic comorbidities  2.  Reactive airway disease.  Likely viral URI.  No findings to suggest pneumonia or significant bacterial  infection  3.  Essential hypertension  4.  Hyperlipidemia  5.  Prediabetes  6.  Osteoarthritis  7.  Obesity  8.  Ethnic neutropenia  9.  Normocytic anemia    Plan:  Due to the delta high sensitive troponin patient is going to be placed under observation.  I am going to check another EKG and then 1 in the morning.  We will check a high sensitive troponin in the morning  Scheduled for an echocardiogram.  He was started on prednisone 40 mg daily and DuoNebs for his reactive airway disease.  I will do a viral respiratory PCR panel.  It should be noted this patient is a full code

## 2023-05-15 NOTE — ED NOTES
Nursing report ED to floor  Antoine Story  83 y.o.  male    HPI :   Chief Complaint   Patient presents with    Shortness of Breath    Cough       Admitting doctor:   Mg Clark MD    Admitting diagnosis:   The primary encounter diagnosis was Bronchitis. Diagnoses of History of diabetes mellitus and History of coronary artery disease were also pertinent to this visit.    Code status:   Current Code Status       Date Active Code Status Order ID Comments User Context       Prior            Allergies:   Patient has no known allergies.    Isolation:   No active isolations    Intake and Output  No intake or output data in the 24 hours ending 05/14/23 2157    Weight:       05/14/23  1648   Weight: 86.2 kg (190 lb)       Most recent vitals:   Vitals:    05/14/23 1854 05/14/23 1858 05/14/23 2030 05/14/23 2046   BP:   159/80 153/78   BP Location:       Patient Position:       Pulse: 60 64     Resp: 18 18     Temp:       TempSrc:       SpO2: 96%      Weight:       Height:           Active LDAs/IV Access:   Lines, Drains & Airways       Active LDAs       Name Placement date Placement time Site Days    Peripheral IV 05/14/23 1739 Left Antecubital 05/14/23  1739  Antecubital  less than 1                    Labs (abnormal labs have a star):   Labs Reviewed   COMPREHENSIVE METABOLIC PANEL - Abnormal; Notable for the following components:       Result Value    Glucose 100 (*)     eGFR 56.4 (*)     All other components within normal limits    Narrative:     GFR Normal >60  Chronic Kidney Disease <60  Kidney Failure <15    The GFR formula is only valid for adults with stable renal function between ages 18 and 70.   TROPONIN - Abnormal; Notable for the following components:    HS Troponin T 33 (*)     All other components within normal limits    Narrative:     High Sensitive Troponin T Reference Range:  <10.0 ng/L- Negative Female for AMI  <15.0 ng/L- Negative Male for AMI  >=10 - Abnormal Female indicating possible myocardial  injury.  >=15 - Abnormal Male indicating possible myocardial injury.   Clinicians would have to utilize clinical acumen, EKG, Troponin, and serial changes to determine if it is an Acute Myocardial Infarction or myocardial injury due to an underlying chronic condition.        CBC WITH AUTO DIFFERENTIAL - Abnormal; Notable for the following components:    WBC 2.06 (*)     RBC 3.22 (*)     Hemoglobin 11.0 (*)     Hematocrit 30.8 (*)     MCH 34.2 (*)     Platelets 125 (*)     Neutrophil % 31.6 (*)     Lymphocyte % 47.1 (*)     Monocyte % 18.4 (*)     Neutrophils, Absolute 0.65 (*)     All other components within normal limits   HIGH SENSITIVITIY TROPONIN T 2HR - Abnormal; Notable for the following components:    HS Troponin T 28 (*)     Troponin T Delta -5 (*)     All other components within normal limits    Narrative:     High Sensitive Troponin T Reference Range:  <10.0 ng/L- Negative Female for AMI  <15.0 ng/L- Negative Male for AMI  >=10 - Abnormal Female indicating possible myocardial injury.  >=15 - Abnormal Male indicating possible myocardial injury.   Clinicians would have to utilize clinical acumen, EKG, Troponin, and serial changes to determine if it is an Acute Myocardial Infarction or myocardial injury due to an underlying chronic condition.        BNP (IN-HOUSE) - Normal    Narrative:     Among patients with dyspnea, NT-proBNP is highly sensitive for the detection of acute congestive heart failure. In addition NT-proBNP of <300 pg/ml effectively rules out acute congestive heart failure with 99% negative predictive value.    Results may be falsely decreased if patient taking Biotin.     LACTIC ACID, PLASMA - Normal   PROCALCITONIN - Normal    Narrative:     As a Marker for Sepsis (Non-Neonates):    1. <0.5 ng/mL represents a low risk of severe sepsis and/or septic shock.  2. >2 ng/mL represents a high risk of severe sepsis and/or septic shock.    As a Marker for Lower Respiratory Tract Infections that  "require antibiotic therapy:    PCT on Admission    Antibiotic Therapy       6-12 Hrs later    >0.5                Strongly Recommended  >0.25 - <0.5        Recommended   0.1 - 0.25          Discouraged              Remeasure/reassess PCT  <0.1                Strongly Discouraged     Remeasure/reassess PCT    As 28 day mortality risk marker: \"Change in Procalcitonin Result\" (>80% or <=80%) if Day 0 (or Day 1) and Day 4 values are available. Refer to http://[email or web address] [place]pct[facility]cal[facility]    Change in PCT <=80%  A decrease of PCT levels below or equal to 80% defines a positive change in PCT test result representing a higher risk for 28-day all-cause mortality of patients diagnosed with severe sepsis for septic shock.    Change in PCT >80%  A decrease of PCT levels of more than 80% defines a negative change in PCT result representing a lower risk for 28-day all-cause mortality of patients diagnosed with severe sepsis or septic shock.      MAGNESIUM - Normal   CBC AND DIFFERENTIAL    Narrative:     The following orders were created for panel order CBC & Differential.  Procedure                               Abnormality         Status                     ---------                               -----------         ------                     CBC Auto Differential[[id number]]        Abnormal            Final result                 Please view results for these tests on the individual orders.       EKG:   ECG 12 Lead Dyspnea   Preliminary Result   HEART RATE= 66  bpm   RR Interval= 909  ms   CA Interval= 190  ms   P Horizontal Axis= 39  deg   P Front Axis= 71  deg   QRSD Interval= 85  ms   QT Interval= 370  ms   QRS Axis= 68  deg   T Wave Axis= -24  deg   - BORDERLINE ECG -   Sinus rhythm   Atrial premature complex   Borderline T abnormalities, diffuse leads   Baseline wander in lead(s) I,aVR,aVL   Electronically Signed By:    Date and Time of Study: 2023-05-14 16:54:19      ECG 12 Lead Other; High sensitive troponin elevation  "   (Results Pending)       Meds given in ED:   Medications   predniSONE (DELTASONE) tablet 40 mg (40 mg Oral Given 5/14/23 1856)   albuterol (PROVENTIL) nebulizer solution 0.083% 2.5 mg/3mL (2.5 mg Nebulization Given 5/14/23 1854)       Imaging results:  XR Chest 1 View    Result Date: 5/14/2023  There is no evidence for an active or acute abnormality of the chest.  This report was finalized on 5/14/2023 7:04 PM by Dr. Michoacano Bonilla M.D.       Ambulatory status:   - Standby    Social issues:   Social History     Socioeconomic History    Marital status:      Spouse name: Candace    Years of education: High school   Tobacco Use    Smoking status: Former   Substance and Sexual Activity    Alcohol use: Yes     Comment: Occasionally    Drug use: No       NIH Stroke Scale:         Bella Aaron RN  05/14/23 21:57 EDT    Call Bella #[phone] with any questions

## 2023-05-16 ENCOUNTER — TELEPHONE (OUTPATIENT)
Dept: ONCOLOGY | Facility: CLINIC | Age: 83
End: 2023-05-16
Payer: MEDICARE

## 2023-05-16 DIAGNOSIS — D47.2 SMOLDERING MYELOMA: Primary | ICD-10-CM

## 2023-05-16 NOTE — TELEPHONE ENCOUNTER
----- Message from Josh Daigle MD sent at 5/16/2023  7:17 AM EDT -----  PIP labs not much changed.  F/u 3 months cbc cmp spep/john/flc 1 wk prior

## 2023-05-16 NOTE — CASE MANAGEMENT/SOCIAL WORK
Case Management Discharge Note      Final Note: DC'd home 5/15              Transportation Services  Private: Car    Final Discharge Disposition Code: 01 - home or self-care

## 2023-05-26 LAB — QT INTERVAL: 370 MS

## 2023-06-06 LAB
QT INTERVAL: 374 MS
QT INTERVAL: 385 MS

## 2023-08-07 ENCOUNTER — LAB (OUTPATIENT)
Dept: LAB | Facility: HOSPITAL | Age: 83
End: 2023-08-07
Payer: MEDICARE

## 2023-08-07 DIAGNOSIS — D47.2 SMOLDERING MYELOMA: ICD-10-CM

## 2023-08-07 LAB
ALBUMIN SERPL-MCNC: 3.8 G/DL (ref 3.5–5.2)
ALBUMIN/GLOB SERPL: 1.3 G/DL
ALP SERPL-CCNC: 60 U/L (ref 39–117)
ALT SERPL W P-5'-P-CCNC: 34 U/L (ref 1–41)
ANION GAP SERPL CALCULATED.3IONS-SCNC: 13 MMOL/L (ref 5–15)
AST SERPL-CCNC: 37 U/L (ref 1–40)
BASOPHILS # BLD AUTO: 0.01 10*3/MM3 (ref 0–0.2)
BASOPHILS NFR BLD AUTO: 0.3 % (ref 0–1.5)
BILIRUB SERPL-MCNC: 0.7 MG/DL (ref 0–1.2)
BUN SERPL-MCNC: 20 MG/DL (ref 8–23)
BUN/CREAT SERPL: 17.1 (ref 7–25)
CALCIUM SPEC-SCNC: 8.7 MG/DL (ref 8.6–10.5)
CHLORIDE SERPL-SCNC: 104 MMOL/L (ref 98–107)
CO2 SERPL-SCNC: 20 MMOL/L (ref 22–29)
CREAT SERPL-MCNC: 1.17 MG/DL (ref 0.7–1.3)
DEPRECATED RDW RBC AUTO: 42.5 FL (ref 37–54)
EGFRCR SERPLBLD CKD-EPI 2021: 62.2 ML/MIN/1.73
EOSINOPHIL # BLD AUTO: 0.03 10*3/MM3 (ref 0–0.4)
EOSINOPHIL NFR BLD AUTO: 1 % (ref 0.3–6.2)
ERYTHROCYTE [DISTWIDTH] IN BLOOD BY AUTOMATED COUNT: 12.1 % (ref 12.3–15.4)
GLOBULIN UR ELPH-MCNC: 3 GM/DL
GLUCOSE SERPL-MCNC: 131 MG/DL (ref 65–99)
HCT VFR BLD AUTO: 34.2 % (ref 37.5–51)
HGB BLD-MCNC: 12.2 G/DL (ref 13–17.7)
IMM GRANULOCYTES # BLD AUTO: 0 10*3/MM3 (ref 0–0.05)
IMM GRANULOCYTES NFR BLD AUTO: 0 % (ref 0–0.5)
LYMPHOCYTES # BLD AUTO: 1.7 10*3/MM3 (ref 0.7–3.1)
LYMPHOCYTES NFR BLD AUTO: 57.6 % (ref 19.6–45.3)
MCH RBC QN AUTO: 34.1 PG (ref 26.6–33)
MCHC RBC AUTO-ENTMCNC: 35.7 G/DL (ref 31.5–35.7)
MCV RBC AUTO: 95.5 FL (ref 79–97)
MONOCYTES # BLD AUTO: 0.26 10*3/MM3 (ref 0.1–0.9)
MONOCYTES NFR BLD AUTO: 8.8 % (ref 5–12)
NEUTROPHILS NFR BLD AUTO: 0.95 10*3/MM3 (ref 1.7–7)
NEUTROPHILS NFR BLD AUTO: 32.3 % (ref 42.7–76)
NRBC BLD AUTO-RTO: 0 /100 WBC (ref 0–0.2)
PLATELET # BLD AUTO: 175 10*3/MM3 (ref 140–450)
PMV BLD AUTO: 9.6 FL (ref 6–12)
POTASSIUM SERPL-SCNC: 4.1 MMOL/L (ref 3.5–5.2)
PROT SERPL-MCNC: 6.8 G/DL (ref 6–8.5)
RBC # BLD AUTO: 3.58 10*6/MM3 (ref 4.14–5.8)
SODIUM SERPL-SCNC: 137 MMOL/L (ref 136–145)
WBC NRBC COR # BLD: 2.95 10*3/MM3 (ref 3.4–10.8)

## 2023-08-07 PROCEDURE — 80053 COMPREHEN METABOLIC PANEL: CPT

## 2023-08-07 PROCEDURE — 85025 COMPLETE CBC W/AUTO DIFF WBC: CPT

## 2023-08-07 PROCEDURE — 36415 COLL VENOUS BLD VENIPUNCTURE: CPT

## 2023-08-08 LAB
ALBUMIN SERPL ELPH-MCNC: 3.9 G/DL (ref 2.9–4.4)
ALBUMIN/GLOB SERPL: 1.2 {RATIO} (ref 0.7–1.7)
ALPHA1 GLOB SERPL ELPH-MCNC: 0.2 G/DL (ref 0–0.4)
ALPHA2 GLOB SERPL ELPH-MCNC: 0.5 G/DL (ref 0.4–1)
B-GLOBULIN SERPL ELPH-MCNC: 0.9 G/DL (ref 0.7–1.3)
GAMMA GLOB SERPL ELPH-MCNC: 1.9 G/DL (ref 0.4–1.8)
GLOBULIN SER-MCNC: 3.5 G/DL (ref 2.2–3.9)
IGA SERPL-MCNC: 21 MG/DL (ref 61–437)
IGG SERPL-MCNC: 2152 MG/DL (ref 603–1613)
IGM SERPL-MCNC: 34 MG/DL (ref 15–143)
INTERPRETATION SERPL IEP-IMP: ABNORMAL
KAPPA LC FREE SER-MCNC: 47.8 MG/L (ref 3.3–19.4)
KAPPA LC FREE/LAMBDA FREE SER: 11.38 {RATIO} (ref 0.26–1.65)
LABORATORY COMMENT REPORT: ABNORMAL
LAMBDA LC FREE SERPL-MCNC: 4.2 MG/L (ref 5.7–26.3)
M PROTEIN SERPL ELPH-MCNC: 1.7 G/DL
PROT SERPL-MCNC: 7.4 G/DL (ref 6–8.5)

## 2023-08-08 NOTE — PROGRESS NOTES
Subjective     REASON FOR FOLLOW-UP:  Chronic leukopenia, smoldering myeloma, anemia                               REQUESTING PHYSICIAN:  Eduardo    RECORDS OBTAINED:  Records of the patients history including those obtained from the referring provider were reviewed and summarized in detail.      History of Present Illness   This is a very pleasant 83-year-old man returning today for follow-up of smoldering myeloma.  He is feeling well today with no significant fatigue, lightheadedness, bruising, bleeding, unusual pain or weight loss.    Past Medical History:   Diagnosis Date    Arthritis     hands    CAD (coronary artery disease)     COVID-19     Diabetes     GERD (gastroesophageal reflux disease)     H/O MGUS (monoclonal gammopathy of unknown significance)     History of colon polyps     History of herpes zoster 2007    History of pneumonia     Hyperlipidemia     Hypertension     Neutropenia     H/O hereditary neutropenia    Prostate cancer 2007    Stroke 1997        Past Surgical History:   Procedure Laterality Date    ANAL FISSURECTOMY      CATARACT EXTRACTION Bilateral 2009    COLONOSCOPY  06/2014    cecal polyp, sigmoid diverticulosis    ENDOSCOPY  2001    OTHER SURGICAL HISTORY  2007    Decortication and right lower lobe with resection    PROSTATE BIOPSY  2007    THORACOSCOPY          Current Outpatient Medications on File Prior to Visit   Medication Sig Dispense Refill    acetaminophen (TYLENOL) 500 MG tablet Take 1 tablet by mouth Every 6 (Six) Hours As Needed for Mild Pain . 100 tablet 0    albuterol sulfate  (90 Base) MCG/ACT inhaler Inhale 2 puffs Every 4 (Four) Hours As Needed for Wheezing. 1 g 0    Alrex 0.2 % suspension SHAKE LIQUID AND INSTILL 1 DROP IN BOTH EYES FOUR TIMES DAILY      amLODIPine (NORVASC) 2.5 MG tablet Take 1 tablet by mouth Daily.      atorvastatin (LIPITOR) 40 MG tablet Daily.      clopidogrel (PLAVIX) 75 MG tablet Take 1 tablet by mouth.      docusate sodium 100 MG capsule  "docusate sodium 100 mg capsule   Take 1 capsule every day by oral route.      esomeprazole (nexIUM) 40 MG capsule Take 1 capsule by mouth Every Morning Before Breakfast.      HYDROcodone-acetaminophen (NORCO) 5-325 MG per tablet Take 1 tablet by mouth Every 6 (Six) Hours As Needed.      irbesartan-hydrochlorothiazide (AVALIDE) 300-12.5 MG tablet       Multiple Vitamins-Minerals (MULTIVITAMIN ADULTS 50+ PO) Take  by mouth Daily.      pregabalin (LYRICA) 50 MG capsule Take 2 capsules by mouth Daily.      sildenafil (REVATIO) 20 MG tablet TK 2 TS PO QD PRN  3     No current facility-administered medications on file prior to visit.        ALLERGIES:  No Known Allergies     Social History     Socioeconomic History    Marital status:      Spouse name: Candace    Years of education: High school   Tobacco Use    Smoking status: Former   Vaping Use    Vaping Use: Never used   Substance and Sexual Activity    Alcohol use: Yes     Comment: Occasionally    Drug use: No        Family History   Problem Relation Age of Onset    Uterine cancer Mother     Hypertension Other     Hyperlipidemia Other     Arthritis Other     Stroke Other     Heart disease Sister     Hypertension Brother         Review of Systems   Constitutional:  Negative for activity change and fatigue.   HENT: Negative.     Eyes: Negative.    Respiratory: Negative.     Cardiovascular: Negative.    Gastrointestinal: Negative.    Genitourinary: Negative.    Musculoskeletal:  Positive for arthralgias.   Skin: Negative.    Allergic/Immunologic: Negative.    Neurological: Negative.    Hematological: Negative.    Psychiatric/Behavioral: Negative.         Objective     Vitals:    08/14/23 1100   BP: 123/71   Pulse: 68   Resp: 16   Temp: 98.4 øF (36.9 øC)   TempSrc: Temporal   SpO2: 96%   Weight: 88.5 kg (195 lb 1.6 oz)   Height: 180.3 cm (70.98\")   PainSc: 0-No pain         8/14/2023    10:55 AM   Current Status   ECOG score 0       Physical Exam    Con: pleasant, " well-appearing man  HEENT: no icterus, no thrush, moist membranes  CV: RRR, S1S2  RESP: CTA bilat, no wheezing  GI: soft, nontender, no splenomegaly, +BS  MS: no edema, left knee in a brace  SKIN: no petechiae or bruising  NEURO: alert and oriented x3, normal strength, normal muscle tone  PSYCH: normal mood  Exam unchanged-2023    RECENT LABS:  Hematology WBC   Date Value Ref Range Status   2023 2.95 (L) 3.40 - 10.80 10*3/mm3 Final     RBC   Date Value Ref Range Status   2023 3.58 (L) 4.14 - 5.80 10*6/mm3 Final     Hemoglobin   Date Value Ref Range Status   2023 12.2 (L) 13.0 - 17.7 g/dL Final     Hematocrit   Date Value Ref Range Status   2023 34.2 (L) 37.5 - 51.0 % Final     Platelets   Date Value Ref Range Status   2023 175 140 - 450 10*3/mm3 Final        Lab Results   Component Value Date    GLUCOSE 131 (H) 2023    BUN 20 2023    CREATININE 1.17 2023    EGFRIFAFRI 72 2021    BCR 17.1 2023    K 4.1 2023    CO2 20.0 (L) 2023    CALCIUM 8.7 2023    PROTENTOTREF 7.4 2023    ALBUMIN 3.8 2023    ALBUMIN 3.9 2023    LABIL2 1.2 2023    AST 37 2023    ALT 34 2023     Peripheral blood flow cytometry 3/12/18: No abnormal myeloid or lymphoid populations identified  B2M.8  M spike 1.7 g/dL  Free light chain ratio 11.38    Skeletal survey 3/12/18:  Negative.        Assessment & Plan     * Chronic leukopenia:   ANC has run borderline low for a number of years.  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    Bone marrow exam was performed 19.  The bone marrow was mildly hypercellular 40% with involvement of a plasma cell dyscrasia 8% by aspirate and 15% by core biopsy.  Plasma cells were monoclonal kappa by flow cytometry.  There was trilineage hematopoiesis.  [place]ongo red stain was negative for amyloid  deposition.  Cytogenetics were normal; complete FISH panel could not be performed.  No dyspoiesis of the white cells noted.    *Smoldering myeloma:    increased number of plasma cells by bone marrow biopsy 8% aspirate/15% core biopsy monoclonal kappa.  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  CBC today is stable.  CMP, SPEP LILLY and free light chain ratio are stable.       *Mild normocytic anemia:    the patient has stage II-III 3A chronic kidney disease as the likely cause.  Iron studies, B12, folic acid without obvious deficiency.    Hemoglobin currently stable 12.2    Hematology plan/recommendations:  Continue observation-5-month follow-up with repeat CBC CMP SPEP/LILLY and free light chain ratio requested

## 2023-08-14 ENCOUNTER — OFFICE VISIT (OUTPATIENT)
Dept: ONCOLOGY | Facility: CLINIC | Age: 83
End: 2023-08-14
Payer: MEDICARE

## 2023-08-14 VITALS
RESPIRATION RATE: 16 BRPM | SYSTOLIC BLOOD PRESSURE: 123 MMHG | DIASTOLIC BLOOD PRESSURE: 71 MMHG | TEMPERATURE: 98.4 F | HEIGHT: 71 IN | BODY MASS INDEX: 27.31 KG/M2 | HEART RATE: 68 BPM | WEIGHT: 195.1 LBS | OXYGEN SATURATION: 96 %

## 2023-08-14 DIAGNOSIS — D47.2 SMOLDERING MYELOMA: Primary | ICD-10-CM

## 2023-08-14 DIAGNOSIS — N18.31 ANEMIA IN STAGE 3A CHRONIC KIDNEY DISEASE: ICD-10-CM

## 2023-08-14 DIAGNOSIS — D63.1 ANEMIA IN STAGE 3A CHRONIC KIDNEY DISEASE: ICD-10-CM

## 2023-08-14 PROCEDURE — 99214 OFFICE O/P EST MOD 30 MIN: CPT | Performed by: INTERNAL MEDICINE

## 2023-08-14 PROCEDURE — 1126F AMNT PAIN NOTED NONE PRSNT: CPT | Performed by: INTERNAL MEDICINE

## 2023-09-12 ENCOUNTER — HOSPITAL ENCOUNTER (OUTPATIENT)
Dept: GENERAL RADIOLOGY | Facility: HOSPITAL | Age: 83
Discharge: HOME | End: 2023-09-12
Payer: MEDICARE

## 2023-09-12 DIAGNOSIS — M25.552 PAIN OF BOTH HIP JOINTS: ICD-10-CM

## 2023-09-12 DIAGNOSIS — M25.551 PAIN OF BOTH HIP JOINTS: ICD-10-CM

## 2023-09-12 PROCEDURE — 73521 X-RAY EXAM HIPS BI 2 VIEWS: CPT

## 2023-09-12 PROCEDURE — 72110 X-RAY EXAM L-2 SPINE 4/>VWS: CPT

## 2023-09-20 ENCOUNTER — TRANSCRIBE ORDERS (OUTPATIENT)
Dept: ADMINISTRATIVE | Facility: HOSPITAL | Age: 83
End: 2023-09-20
Payer: MEDICARE

## 2023-09-20 DIAGNOSIS — M25.451 SWELLING OF JOINT OF PELVIC REGION OR THIGH, RIGHT: Primary | ICD-10-CM

## 2023-09-20 DIAGNOSIS — M16.11 OSTEOARTHRITIS OF RIGHT HIP, UNSPECIFIED OSTEOARTHRITIS TYPE: ICD-10-CM

## 2023-09-21 ENCOUNTER — HOSPITAL ENCOUNTER (OUTPATIENT)
Facility: HOSPITAL | Age: 83
Discharge: HOME | End: 2023-09-21
Admitting: INTERNAL MEDICINE
Payer: MEDICARE

## 2023-09-21 DIAGNOSIS — M16.11 OSTEOARTHRITIS OF RIGHT HIP, UNSPECIFIED OSTEOARTHRITIS TYPE: ICD-10-CM

## 2023-09-21 DIAGNOSIS — M25.451 SWELLING OF JOINT OF PELVIC REGION OR THIGH, RIGHT: ICD-10-CM

## 2023-09-21 PROCEDURE — 76882 US LMTD JT/FCL EVL NVASC XTR: CPT

## 2023-12-11 ENCOUNTER — HOSPITAL ENCOUNTER (OUTPATIENT)
Dept: GENERAL RADIOLOGY | Facility: HOSPITAL | Age: 83
Discharge: HOME | End: 2023-12-11
Payer: MEDICARE

## 2023-12-11 DIAGNOSIS — T14.8XXA FOREIGN MATERIAL: ICD-10-CM

## 2023-12-11 PROCEDURE — 73090 X-RAY EXAM OF FOREARM: CPT

## 2023-12-11 PROCEDURE — 73060 X-RAY EXAM OF HUMERUS: CPT

## 2023-12-14 ENCOUNTER — TRANSCRIBE ORDERS (OUTPATIENT)
Dept: ADMINISTRATIVE | Facility: HOSPITAL | Age: 83
End: 2023-12-14
Payer: MEDICARE

## 2023-12-14 DIAGNOSIS — M54.16 LUMBAR RADICULOPATHY: Primary | ICD-10-CM

## 2024-01-04 ENCOUNTER — HOSPITAL ENCOUNTER (OUTPATIENT)
Facility: HOSPITAL | Age: 84
Discharge: HOME | End: 2024-01-04
Admitting: INTERNAL MEDICINE
Payer: MEDICARE

## 2024-01-04 DIAGNOSIS — M54.16 LUMBAR RADICULOPATHY: ICD-10-CM

## 2024-01-04 PROCEDURE — 72148 MRI LUMBAR SPINE W/O DYE: CPT

## 2024-01-08 ENCOUNTER — LAB (OUTPATIENT)
Dept: LAB | Facility: HOSPITAL | Age: 84
End: 2024-01-08
Payer: MEDICARE

## 2024-01-08 DIAGNOSIS — D47.2 SMOLDERING MYELOMA: ICD-10-CM

## 2024-01-08 LAB
ALBUMIN SERPL-MCNC: 3.9 G/DL (ref 3.5–5.2)
ALBUMIN/GLOB SERPL: 1.2 G/DL
ALP SERPL-CCNC: 57 U/L (ref 39–117)
ALT SERPL W P-5'-P-CCNC: 38 U/L (ref 1–41)
ANION GAP SERPL CALCULATED.3IONS-SCNC: 7.1 MMOL/L (ref 5–15)
AST SERPL-CCNC: 34 U/L (ref 1–40)
BASOPHILS # BLD AUTO: 0.01 10*3/MM3 (ref 0–0.2)
BASOPHILS NFR BLD AUTO: 0.2 % (ref 0–1.5)
BILIRUB SERPL-MCNC: 0.8 MG/DL (ref 0–1.2)
BUN SERPL-MCNC: 13 MG/DL (ref 8–23)
BUN/CREAT SERPL: 11.1 (ref 7–25)
CALCIUM SPEC-SCNC: 8.9 MG/DL (ref 8.6–10.5)
CHLORIDE SERPL-SCNC: 101 MMOL/L (ref 98–107)
CO2 SERPL-SCNC: 26.9 MMOL/L (ref 22–29)
CREAT SERPL-MCNC: 1.17 MG/DL (ref 0.76–1.27)
DEPRECATED RDW RBC AUTO: 43.3 FL (ref 37–54)
EGFRCR SERPLBLD CKD-EPI 2021: 62.2 ML/MIN/1.73
EOSINOPHIL # BLD AUTO: 0.05 10*3/MM3 (ref 0–0.4)
EOSINOPHIL NFR BLD AUTO: 1.2 % (ref 0.3–6.2)
ERYTHROCYTE [DISTWIDTH] IN BLOOD BY AUTOMATED COUNT: 12.2 % (ref 12.3–15.4)
GLOBULIN UR ELPH-MCNC: 3.3 GM/DL
GLUCOSE SERPL-MCNC: 182 MG/DL (ref 65–99)
HCT VFR BLD AUTO: 35.3 % (ref 37.5–51)
HGB BLD-MCNC: 12.6 G/DL (ref 13–17.7)
IMM GRANULOCYTES # BLD AUTO: 0 10*3/MM3 (ref 0–0.05)
IMM GRANULOCYTES NFR BLD AUTO: 0 % (ref 0–0.5)
LYMPHOCYTES # BLD AUTO: 2.05 10*3/MM3 (ref 0.7–3.1)
LYMPHOCYTES NFR BLD AUTO: 48.7 % (ref 19.6–45.3)
MCH RBC QN AUTO: 34.4 PG (ref 26.6–33)
MCHC RBC AUTO-ENTMCNC: 35.7 G/DL (ref 31.5–35.7)
MCV RBC AUTO: 96.4 FL (ref 79–97)
MONOCYTES # BLD AUTO: 0.37 10*3/MM3 (ref 0.1–0.9)
MONOCYTES NFR BLD AUTO: 8.8 % (ref 5–12)
NEUTROPHILS NFR BLD AUTO: 1.73 10*3/MM3 (ref 1.7–7)
NEUTROPHILS NFR BLD AUTO: 41.1 % (ref 42.7–76)
NRBC BLD AUTO-RTO: 0 /100 WBC (ref 0–0.2)
PLATELET # BLD AUTO: 178 10*3/MM3 (ref 140–450)
PMV BLD AUTO: 9.3 FL (ref 6–12)
POTASSIUM SERPL-SCNC: 3.9 MMOL/L (ref 3.5–5.2)
PROT SERPL-MCNC: 7.2 G/DL (ref 6–8.5)
RBC # BLD AUTO: 3.66 10*6/MM3 (ref 4.14–5.8)
SODIUM SERPL-SCNC: 135 MMOL/L (ref 136–145)
WBC NRBC COR # BLD AUTO: 4.21 10*3/MM3 (ref 3.4–10.8)

## 2024-01-08 PROCEDURE — 85025 COMPLETE CBC W/AUTO DIFF WBC: CPT

## 2024-01-08 PROCEDURE — 80053 COMPREHEN METABOLIC PANEL: CPT

## 2024-01-08 PROCEDURE — 36415 COLL VENOUS BLD VENIPUNCTURE: CPT

## 2024-01-09 LAB
ALBUMIN SERPL ELPH-MCNC: 3.8 G/DL (ref 2.9–4.4)
ALBUMIN/GLOB SERPL: 1.3 {RATIO} (ref 0.7–1.7)
ALPHA1 GLOB SERPL ELPH-MCNC: 0.2 G/DL (ref 0–0.4)
ALPHA2 GLOB SERPL ELPH-MCNC: 0.4 G/DL (ref 0.4–1)
B-GLOBULIN SERPL ELPH-MCNC: 0.7 G/DL (ref 0.7–1.3)
GAMMA GLOB SERPL ELPH-MCNC: 1.8 G/DL (ref 0.4–1.8)
GLOBULIN SER-MCNC: 3.1 G/DL (ref 2.2–3.9)
IGA SERPL-MCNC: 42 MG/DL (ref 61–437)
IGG SERPL-MCNC: 1956 MG/DL (ref 603–1613)
IGM SERPL-MCNC: 34 MG/DL (ref 15–143)
INTERPRETATION SERPL IEP-IMP: ABNORMAL
KAPPA LC FREE SER-MCNC: 46.6 MG/L (ref 3.3–19.4)
KAPPA LC FREE/LAMBDA FREE SER: 4.66 {RATIO} (ref 0.26–1.65)
LABORATORY COMMENT REPORT: ABNORMAL
LAMBDA LC FREE SERPL-MCNC: 10 MG/L (ref 5.7–26.3)
M PROTEIN SERPL ELPH-MCNC: 1.6 G/DL
PROT SERPL-MCNC: 6.9 G/DL (ref 6–8.5)

## 2024-01-09 NOTE — PROGRESS NOTES
Subjective     REASON FOR FOLLOW-UP:  Chronic leukopenia, smoldering myeloma, anemia                               REQUESTING PHYSICIAN:  Eduardo    RECORDS OBTAINED:  Records of the patients history including those obtained from the referring provider were reviewed and summarized in detail.      History of Present Illness   This is a very pleasant 83-year-old man returning today for follow-up of smoldering myeloma.  He is feeling well today with no significant fatigue, lightheadedness, bruising, bleeding, or weight loss.  He has been having low back pain and MRI showed degenerative changes and spinal stenosis, no evidence of malignancy.    Past Medical History:   Diagnosis Date    Arthritis     hands    CAD (coronary artery disease)     COVID-19     Diabetes     GERD (gastroesophageal reflux disease)     H/O MGUS (monoclonal gammopathy of unknown significance)     History of colon polyps     History of herpes zoster 2007    History of pneumonia     Hyperlipidemia     Hypertension     Neutropenia     H/O hereditary neutropenia    Prostate cancer 2007    Stroke 1997        Past Surgical History:   Procedure Laterality Date    ANAL FISSURECTOMY      CATARACT EXTRACTION Bilateral 2009    COLONOSCOPY  06/2014    cecal polyp, sigmoid diverticulosis    ENDOSCOPY  2001    OTHER SURGICAL HISTORY  2007    Decortication and right lower lobe with resection    PROSTATE BIOPSY  2007    THORACOSCOPY          Current Outpatient Medications on File Prior to Visit   Medication Sig Dispense Refill    acetaminophen (TYLENOL) 500 MG tablet Take 1 tablet by mouth Every 6 (Six) Hours As Needed for Mild Pain . 100 tablet 0    albuterol sulfate  (90 Base) MCG/ACT inhaler Inhale 2 puffs Every 4 (Four) Hours As Needed for Wheezing. 1 g 0    Alrex 0.2 % suspension SHAKE LIQUID AND INSTILL 1 DROP IN BOTH EYES FOUR TIMES DAILY      amLODIPine (NORVASC) 2.5 MG tablet Take 1 tablet by mouth Daily.      atorvastatin (LIPITOR) 40 MG tablet  Daily.      clopidogrel (PLAVIX) 75 MG tablet Take 1 tablet by mouth.      docusate sodium 100 MG capsule docusate sodium 100 mg capsule   Take 1 capsule every day by oral route.      esomeprazole (nexIUM) 40 MG capsule Take 1 capsule by mouth Every Morning Before Breakfast.      HYDROcodone-acetaminophen (NORCO) 5-325 MG per tablet Take 1 tablet by mouth Every 6 (Six) Hours As Needed.      irbesartan-hydrochlorothiazide (AVALIDE) 300-12.5 MG tablet       Multiple Vitamins-Minerals (MULTIVITAMIN ADULTS 50+ PO) Take  by mouth Daily.      pregabalin (LYRICA) 50 MG capsule Take 2 capsules by mouth Daily.      sildenafil (REVATIO) 20 MG tablet TK 2 TS PO QD PRN  3     No current facility-administered medications on file prior to visit.        ALLERGIES:  No Known Allergies     Social History     Socioeconomic History    Marital status:      Spouse name: Candace    Years of education: High school   Tobacco Use    Smoking status: Former   Vaping Use    Vaping Use: Never used   Substance and Sexual Activity    Alcohol use: Yes     Comment: Occasionally    Drug use: No        Family History   Problem Relation Age of Onset    Uterine cancer Mother     Hypertension Other     Hyperlipidemia Other     Arthritis Other     Stroke Other     Heart disease Sister     Hypertension Brother         Review of Systems   Constitutional:  Negative for activity change and fatigue.   HENT: Negative.     Eyes: Negative.    Respiratory: Negative.     Cardiovascular: Negative.    Gastrointestinal: Negative.    Genitourinary: Negative.    Musculoskeletal:  Positive for arthralgias, back pain and gait problem.   Skin: Negative.    Allergic/Immunologic: Negative.    Hematological: Negative.    Psychiatric/Behavioral: Negative.           Objective     There were no vitals filed for this visit.        8/14/2023    10:55 AM   Current Status   ECOG score 0       Physical Exam    Con: pleasant, well-appearing man  HEENT: no icterus, no thrush,  moist membranes  CV: RRR, S1S2  RESP: CTA bilat, no wheezing  GI: soft, nontender, no splenomegaly, +BS  MS: no edema, ambulates slight stooped posture  SKIN: no petechiae or bruising  NEURO: alert and oriented x3, normal strength, normal muscle tone  PSYCH: normal mood      RECENT LABS:  Hematology WBC   Date Value Ref Range Status   01/08/2024 4.21 3.40 - 10.80 10*3/mm3 Final     RBC   Date Value Ref Range Status   01/08/2024 3.66 (L) 4.14 - 5.80 10*6/mm3 Final     Hemoglobin   Date Value Ref Range Status   01/08/2024 12.6 (L) 13.0 - 17.7 g/dL Final     Hematocrit   Date Value Ref Range Status   01/08/2024 35.3 (L) 37.5 - 51.0 % Final     Platelets   Date Value Ref Range Status   01/08/2024 178 140 - 450 10*3/mm3 Final        Lab Results   Component Value Date    GLUCOSE 182 (H) 01/08/2024    BUN 13 01/08/2024    CREATININE 1.17 01/08/2024    EGFRIFAFRI 72 09/30/2021    BCR 11.1 01/08/2024    K 3.9 01/08/2024    CO2 26.9 01/08/2024    CALCIUM 8.9 01/08/2024    PROTENTOTREF 7.4 08/07/2023    ALBUMIN 3.9 01/08/2024    LABIL2 1.2 08/07/2023    AST 34 01/08/2024    ALT 38 01/08/2024             Assessment & Plan     * Chronic leukopenia:   ANC has run borderline low for a number of years.  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    Bone marrow exam was performed 1/11/19.  The bone marrow was mildly hypercellular 40% with involvement of a plasma cell dyscrasia 8% by aspirate and 15% by core biopsy.  Plasma cells were monoclonal kappa by flow cytometry.  There was trilineage hematopoiesis.  [place] red stain was negative for amyloid deposition.  Cytogenetics were normal; complete FISH panel could not be performed.  No dyspoiesis of the white cells noted.    *Smoldering myeloma:    increased number of plasma cells by bone marrow biopsy 8% aspirate/15% core biopsy monoclonal kappa.  At this time, I would define the  patient is having smoldering myeloma based on the 15% plasma cells in the core biopsy but lack of obvious end organ damage.  CBC today is stable.  CMP, SPEP LILLY and free light chain ratio are stable.       *Mild normocytic anemia:    the patient has stage II-III 3A chronic kidney disease as the likely cause.  Iron studies, B12, folic acid without obvious deficiency.    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

## 2024-01-11 ENCOUNTER — TELEPHONE (OUTPATIENT)
Dept: ONCOLOGY | Facility: CLINIC | Age: 84
End: 2024-01-11
Payer: MEDICARE

## 2024-01-11 NOTE — TELEPHONE ENCOUNTER
Caller: Antoine Story    Relationship to patient: Self    Best call back number: [phone]    Chief complaint: PT CALLED TO RESCHEDULE IF POSSIBLE     Type of visit: VITALS AND FOLLOW UP 1    Requested date: SAME DAY 1-15-24 JUST LATER TIME, IF NOT HE WILL KEEP APPOINTMENT AS IS       If rescheduling, when is the original appointment: 1-15-24

## 2024-01-15 ENCOUNTER — OFFICE VISIT (OUTPATIENT)
Dept: ONCOLOGY | Facility: CLINIC | Age: 84
End: 2024-01-15
Payer: MEDICARE

## 2024-01-15 VITALS
RESPIRATION RATE: 18 BRPM | HEART RATE: 70 BPM | BODY MASS INDEX: 28.63 KG/M2 | OXYGEN SATURATION: 97 % | DIASTOLIC BLOOD PRESSURE: 63 MMHG | SYSTOLIC BLOOD PRESSURE: 168 MMHG | WEIGHT: 204.5 LBS | HEIGHT: 71 IN | TEMPERATURE: 97.4 F

## 2024-01-15 DIAGNOSIS — D70.8 OTHER NEUTROPENIA: ICD-10-CM

## 2024-01-15 DIAGNOSIS — D63.1 ANEMIA IN STAGE 3A CHRONIC KIDNEY DISEASE: ICD-10-CM

## 2024-01-15 DIAGNOSIS — D47.2 SMOLDERING MYELOMA: Primary | ICD-10-CM

## 2024-01-15 DIAGNOSIS — N18.31 ANEMIA IN STAGE 3A CHRONIC KIDNEY DISEASE: ICD-10-CM

## 2024-01-15 PROCEDURE — 1126F AMNT PAIN NOTED NONE PRSNT: CPT | Performed by: INTERNAL MEDICINE

## 2024-01-15 PROCEDURE — 99213 OFFICE O/P EST LOW 20 MIN: CPT | Performed by: INTERNAL MEDICINE

## 2024-01-15 RX ORDER — CEFADROXIL 500 MG/1
1 CAPSULE ORAL 2 TIMES DAILY
COMMUNITY

## 2024-01-15 RX ORDER — ZOLPIDEM TARTRATE 5 MG/1
1 TABLET ORAL
COMMUNITY
Start: 2023-09-11

## 2024-03-15 ENCOUNTER — HOSPITAL ENCOUNTER (EMERGENCY)
Facility: HOSPITAL | Age: 84
Discharge: HOME | End: 2024-03-15
Attending: EMERGENCY MEDICINE
Payer: MEDICARE

## 2024-03-15 ENCOUNTER — APPOINTMENT (OUTPATIENT)
Dept: GENERAL RADIOLOGY | Facility: HOSPITAL | Age: 84
End: 2024-03-15
Payer: MEDICARE

## 2024-03-15 VITALS
SYSTOLIC BLOOD PRESSURE: 150 MMHG | WEIGHT: 204.59 LBS | RESPIRATION RATE: 18 BRPM | BODY MASS INDEX: 28.64 KG/M2 | OXYGEN SATURATION: 98 % | TEMPERATURE: 97.3 F | HEIGHT: 71 IN | HEART RATE: 64 BPM | DIASTOLIC BLOOD PRESSURE: 73 MMHG

## 2024-03-15 DIAGNOSIS — R06.2 WHEEZING: Primary | ICD-10-CM

## 2024-03-15 LAB
ALBUMIN SERPL-MCNC: 4.2 G/DL (ref 3.5–5.2)
ALBUMIN/GLOB SERPL: 1.3 G/DL
ALP SERPL-CCNC: 76 U/L (ref 39–117)
ALT SERPL W P-5'-P-CCNC: 32 U/L (ref 1–41)
ANION GAP SERPL CALCULATED.3IONS-SCNC: 10 MMOL/L (ref 5–15)
AST SERPL-CCNC: 28 U/L (ref 1–40)
B PARAPERT DNA SPEC QL NAA+PROBE: NOT DETECTED
B PERT DNA SPEC QL NAA+PROBE: NOT DETECTED
BASOPHILS # BLD AUTO: 0.02 10*3/MM3 (ref 0–0.2)
BASOPHILS NFR BLD AUTO: 0.5 % (ref 0–1.5)
BILIRUB SERPL-MCNC: 0.7 MG/DL (ref 0–1.2)
BUN SERPL-MCNC: 17 MG/DL (ref 8–23)
BUN/CREAT SERPL: 14.3 (ref 7–25)
C PNEUM DNA NPH QL NAA+NON-PROBE: NOT DETECTED
CALCIUM SPEC-SCNC: 8.9 MG/DL (ref 8.6–10.5)
CHLORIDE SERPL-SCNC: 102 MMOL/L (ref 98–107)
CO2 SERPL-SCNC: 25 MMOL/L (ref 22–29)
CREAT SERPL-MCNC: 1.19 MG/DL (ref 0.76–1.27)
DEPRECATED RDW RBC AUTO: 47.7 FL (ref 37–54)
EGFRCR SERPLBLD CKD-EPI 2021: 60.6 ML/MIN/1.73
EOSINOPHIL # BLD AUTO: 0.09 10*3/MM3 (ref 0–0.4)
EOSINOPHIL NFR BLD AUTO: 2.4 % (ref 0.3–6.2)
ERYTHROCYTE [DISTWIDTH] IN BLOOD BY AUTOMATED COUNT: 13.3 % (ref 12.3–15.4)
FLUAV SUBTYP SPEC NAA+PROBE: NOT DETECTED
FLUBV RNA ISLT QL NAA+PROBE: NOT DETECTED
GLOBULIN UR ELPH-MCNC: 3.2 GM/DL
GLUCOSE SERPL-MCNC: 146 MG/DL (ref 65–99)
HADV DNA SPEC NAA+PROBE: NOT DETECTED
HCOV 229E RNA SPEC QL NAA+PROBE: NOT DETECTED
HCOV HKU1 RNA SPEC QL NAA+PROBE: NOT DETECTED
HCOV NL63 RNA SPEC QL NAA+PROBE: NOT DETECTED
HCOV OC43 RNA SPEC QL NAA+PROBE: NOT DETECTED
HCT VFR BLD AUTO: 34.8 % (ref 37.5–51)
HGB BLD-MCNC: 12.1 G/DL (ref 13–17.7)
HMPV RNA NPH QL NAA+NON-PROBE: NOT DETECTED
HOLD SPECIMEN: NORMAL
HOLD SPECIMEN: NORMAL
HPIV1 RNA ISLT QL NAA+PROBE: NOT DETECTED
HPIV2 RNA SPEC QL NAA+PROBE: NOT DETECTED
HPIV3 RNA NPH QL NAA+PROBE: NOT DETECTED
HPIV4 P GENE NPH QL NAA+PROBE: NOT DETECTED
IMM GRANULOCYTES # BLD AUTO: 0.03 10*3/MM3 (ref 0–0.05)
IMM GRANULOCYTES NFR BLD AUTO: 0.8 % (ref 0–0.5)
LYMPHOCYTES # BLD AUTO: 1.54 10*3/MM3 (ref 0.7–3.1)
LYMPHOCYTES NFR BLD AUTO: 41.2 % (ref 19.6–45.3)
M PNEUMO IGG SER IA-ACNC: NOT DETECTED
MCH RBC QN AUTO: 34.2 PG (ref 26.6–33)
MCHC RBC AUTO-ENTMCNC: 34.8 G/DL (ref 31.5–35.7)
MCV RBC AUTO: 98.3 FL (ref 79–97)
MONOCYTES # BLD AUTO: 0.37 10*3/MM3 (ref 0.1–0.9)
MONOCYTES NFR BLD AUTO: 9.9 % (ref 5–12)
NEUTROPHILS NFR BLD AUTO: 1.69 10*3/MM3 (ref 1.7–7)
NEUTROPHILS NFR BLD AUTO: 45.2 % (ref 42.7–76)
NRBC BLD AUTO-RTO: 0 /100 WBC (ref 0–0.2)
NT-PROBNP SERPL-MCNC: 43.3 PG/ML (ref 0–1800)
PLATELET # BLD AUTO: 170 10*3/MM3 (ref 140–450)
PMV BLD AUTO: 10 FL (ref 6–12)
POTASSIUM SERPL-SCNC: 3.7 MMOL/L (ref 3.5–5.2)
PROT SERPL-MCNC: 7.4 G/DL (ref 6–8.5)
QT INTERVAL: 377 MS
QTC INTERVAL: 419 MS
RBC # BLD AUTO: 3.54 10*6/MM3 (ref 4.14–5.8)
RHINOVIRUS RNA SPEC NAA+PROBE: NOT DETECTED
RSV RNA NPH QL NAA+NON-PROBE: NOT DETECTED
SARS-COV-2 RNA NPH QL NAA+NON-PROBE: NOT DETECTED
SODIUM SERPL-SCNC: 137 MMOL/L (ref 136–145)
TROPONIN T SERPL HS-MCNC: 28 NG/L
TROPONIN T SERPL HS-MCNC: 28 NG/L
WBC NRBC COR # BLD AUTO: 3.74 10*3/MM3 (ref 3.4–10.8)
WHOLE BLOOD HOLD COAG: NORMAL
WHOLE BLOOD HOLD SPECIMEN: NORMAL

## 2024-03-15 PROCEDURE — 93010 ELECTROCARDIOGRAM REPORT: CPT | Performed by: INTERNAL MEDICINE

## 2024-03-15 PROCEDURE — 94799 UNLISTED PULMONARY SVC/PX: CPT

## 2024-03-15 PROCEDURE — 93005 ELECTROCARDIOGRAM TRACING: CPT | Performed by: EMERGENCY MEDICINE

## 2024-03-15 PROCEDURE — 0202U NFCT DS 22 TRGT SARS-COV-2: CPT | Performed by: EMERGENCY MEDICINE

## 2024-03-15 PROCEDURE — 71045 X-RAY EXAM CHEST 1 VIEW: CPT

## 2024-03-15 PROCEDURE — 93005 ELECTROCARDIOGRAM TRACING: CPT

## 2024-03-15 PROCEDURE — 85025 COMPLETE CBC W/AUTO DIFF WBC: CPT

## 2024-03-15 PROCEDURE — 63710000001 PREDNISONE PER 1 MG: Performed by: EMERGENCY MEDICINE

## 2024-03-15 PROCEDURE — 80053 COMPREHEN METABOLIC PANEL: CPT | Performed by: EMERGENCY MEDICINE

## 2024-03-15 PROCEDURE — 83880 ASSAY OF NATRIURETIC PEPTIDE: CPT | Performed by: EMERGENCY MEDICINE

## 2024-03-15 PROCEDURE — 99284 EMERGENCY DEPT VISIT MOD MDM: CPT

## 2024-03-15 PROCEDURE — 94664 DEMO&/EVAL PT USE INHALER: CPT

## 2024-03-15 PROCEDURE — 84484 ASSAY OF TROPONIN QUANT: CPT | Performed by: EMERGENCY MEDICINE

## 2024-03-15 PROCEDURE — 94761 N-INVAS EAR/PLS OXIMETRY MLT: CPT

## 2024-03-15 PROCEDURE — 94640 AIRWAY INHALATION TREATMENT: CPT

## 2024-03-15 PROCEDURE — 36415 COLL VENOUS BLD VENIPUNCTURE: CPT

## 2024-03-15 RX ORDER — SODIUM CHLORIDE 0.9 % (FLUSH) 0.9 %
10 SYRINGE (ML) INJECTION AS NEEDED
Status: DISCONTINUED | OUTPATIENT
Start: 2024-03-15 | End: 2024-03-15 | Stop reason: HOSPADM

## 2024-03-15 RX ORDER — PREDNISONE 20 MG/1
60 TABLET ORAL ONCE
Status: COMPLETED | OUTPATIENT
Start: 2024-03-15 | End: 2024-03-15

## 2024-03-15 RX ORDER — PREDNISONE 20 MG/1
TABLET ORAL
Qty: 10 TABLET | Refills: 0 | Status: SHIPPED | OUTPATIENT
Start: 2024-03-15

## 2024-03-15 RX ORDER — ALBUTEROL SULFATE 2.5 MG/3ML
2.5 SOLUTION RESPIRATORY (INHALATION) ONCE
Status: COMPLETED | OUTPATIENT
Start: 2024-03-15 | End: 2024-03-15

## 2024-03-15 RX ORDER — LORATADINE 10 MG/1
10 TABLET ORAL DAILY
Qty: 30 TABLET | Refills: 0 | Status: SHIPPED | OUTPATIENT
Start: 2024-03-15

## 2024-03-15 RX ADMIN — PREDNISONE 60 MG: 20 TABLET ORAL at 16:35

## 2024-03-15 RX ADMIN — ALBUTEROL SULFATE 2.5 MG: 2.5 SOLUTION RESPIRATORY (INHALATION) at 16:55

## 2024-03-15 NOTE — ED NOTES
"PT c/o a \"wheezing\" shortness of breath that has been around for \"awhile\" but got real bad lately, pt says he was recently (2 weeks) treated for shingles   "

## 2024-03-15 NOTE — ED PROVIDER NOTES
EMERGENCY DEPARTMENT ENCOUNTER  Room Number:  33/33  PCP: Mg Clark MD  Independent Historians: Patient and spouse      HPI:  Chief Complaint: Wheezing    A complete HPI/ROS/PMH/PSH/SH/FH are unobtainable due to: None    Chronic or social conditions impacting patient care (Social Determinants of Health): None      Context: Antoine Story is a 84 y.o. male with a medical history of CAD, GERD, diet-controlled diabetes who presents to the ED c/o acute wheezing that he had intermittently for years but seems worse in the last 2 weeks since coming back from [place].  No cough, hemoptysis or fevers.  Happens at rest as well as with activity.  No abdominal pain nausea vomiting diarrhea black or bloody stools, lower extremity swelling reported.      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 04/16/2018    Mild renal insufficiency 06/26/2020    Bronchitis 05/14/2023     Resolved Ambulatory Problems     Diagnosis Date Noted    No Resolved Ambulatory Problems     Past Medical History:   Diagnosis Date    Arthritis     CAD (coronary artery disease)     COVID-19     Diabetes     GERD (gastroesophageal reflux disease)     H/O MGUS (monoclonal gammopathy of unknown significance)     History of colon polyps     History of herpes zoster 2007    History of pneumonia     Hyperlipidemia     Hypertension     Neutropenia     Prostate cancer 2007    Stroke 1997         PAST SURGICAL HISTORY  Past Surgical History:   Procedure Laterality Date    ANAL FISSURECTOMY      CATARACT EXTRACTION Bilateral 2009    COLONOSCOPY  06/2014    cecal polyp, sigmoid diverticulosis    ENDOSCOPY  2001    OTHER SURGICAL HISTORY  2007    Decortication and right lower lobe with resection    PROSTATE  BIOPSY  2007    THORACOSCOPY           FAMILY HISTORY  Family History   Problem Relation Age of Onset    Uterine cancer Mother     Hypertension Other     Hyperlipidemia Other     Arthritis Other     Stroke Other     Heart disease Sister     Hypertension Brother          SOCIAL HISTORY  Social History     Socioeconomic History    Marital status:      Spouse name: Candace    Years of education: High school   Tobacco Use    Smoking status: Former   Vaping Use    Vaping status: Never Used   Substance and Sexual Activity    Alcohol use: Yes     Comment: Occasionally    Drug use: No         ALLERGIES  Fluoxetine and Gabapentin      REVIEW OF SYSTEMS  Review of Systems  Included in HPI  All systems reviewed and negative except for those discussed in HPI.      PHYSICAL EXAM    I have reviewed the triage vital signs and nursing notes.    ED Triage Vitals   Temp Heart Rate Resp BP SpO2   03/15/24 1236 03/15/24 1236 03/15/24 1236 03/15/24 1237 03/15/24 1236   97.3 °F (36.3 °C) 84 18 176/80 97 %      Temp src Heart Rate Source Patient Position BP Location FiO2 (%)   -- -- -- -- --              Physical Exam    Physical Exam   Constitutional: No distress.  Nontoxic  HENT:  Head: Normocephalic and atraumatic.   Oropharynx: Mucous membranes are moist.   Eyes: . No scleral icterus. No conjunctival pallor.  Neck: Normal range of motion. Neck supple.   Cardiovascular: Pink warm and well perfused throughout.    Pulmonary/Chest: No respiratory distress.  No tachypnea or increased work of breathing appreciated.    Abdominal: Soft. There is no tenderness. There is no rebound and no guarding.   Musculoskeletal: Moves all extremities equally.    Neurological: Alert and oriented.  No acute focal deficit appreciated.  Skin: Skin is pink, warm, and dry.   Psychiatric: Mood and affect normal.   Nursing note and vitals reviewed.             LAB RESULTS  Recent Results (from the past 24 hour(s))   ECG 12 Lead ED Triage Standing Order;  SOA    Collection Time: 03/15/24 12:40 PM   Result Value Ref Range    QT Interval 377 ms    QTC Interval 419 ms   Comprehensive Metabolic Panel    Collection Time: 03/15/24  1:07 PM    Specimen: Blood   Result Value Ref Range    Glucose 146 (H) 65 - 99 mg/dL    BUN 17 8 - 23 mg/dL    Creatinine 1.19 0.76 - 1.27 mg/dL    Sodium 137 136 - 145 mmol/L    Potassium 3.7 3.5 - 5.2 mmol/L    Chloride 102 98 - 107 mmol/L    CO2 25.0 22.0 - 29.0 mmol/L    Calcium 8.9 8.6 - 10.5 mg/dL    Total Protein 7.4 6.0 - 8.5 g/dL    Albumin 4.2 3.5 - 5.2 g/dL    ALT (SGPT) 32 1 - 41 U/L    AST (SGOT) 28 1 - 40 U/L    Alkaline Phosphatase 76 39 - 117 U/L    Total Bilirubin 0.7 0.0 - 1.2 mg/dL    Globulin 3.2 gm/dL    A/G Ratio 1.3 g/dL    BUN/Creatinine Ratio 14.3 7.0 - 25.0    Anion Gap 10.0 5.0 - 15.0 mmol/L    eGFR 60.6 >60.0 mL/min/1.73   BNP    Collection Time: 03/15/24  1:07 PM    Specimen: Blood   Result Value Ref Range    proBNP 43.3 0.0 - 1,800.0 pg/mL   Single High Sensitivity Troponin T    Collection Time: 03/15/24  1:07 PM    Specimen: Blood   Result Value Ref Range    HS Troponin T 28 (H) <22 ng/L   Green Top (Gel)    Collection Time: 03/15/24  1:07 PM   Result Value Ref Range    Extra Tube Hold for add-ons.    Lavender Top    Collection Time: 03/15/24  1:07 PM   Result Value Ref Range    Extra Tube hold for add-on    Gold Top - SST    Collection Time: 03/15/24  1:07 PM   Result Value Ref Range    Extra Tube Hold for add-ons.    Light Blue Top    Collection Time: 03/15/24  1:07 PM   Result Value Ref Range    Extra Tube Hold for add-ons.    CBC Auto Differential    Collection Time: 03/15/24  1:07 PM    Specimen: Blood   Result Value Ref Range    WBC 3.74 3.40 - 10.80 10*3/mm3    RBC 3.54 (L) 4.14 - 5.80 10*6/mm3    Hemoglobin 12.1 (L) 13.0 - 17.7 g/dL    Hematocrit 34.8 (L) 37.5 - 51.0 %    MCV 98.3 (H) 79.0 - 97.0 fL    MCH 34.2 (H) 26.6 - 33.0 pg    MCHC 34.8 31.5 - 35.7 g/dL    RDW 13.3 12.3 - 15.4 %    RDW-SD 47.7 37.0 -  54.0 fl    MPV 10.0 6.0 - 12.0 fL    Platelets 170 140 - 450 10*3/mm3    Neutrophil % 45.2 42.7 - 76.0 %    Lymphocyte % 41.2 19.6 - 45.3 %    Monocyte % 9.9 5.0 - 12.0 %    Eosinophil % 2.4 0.3 - 6.2 %    Basophil % 0.5 0.0 - 1.5 %    Immature Grans % 0.8 (H) 0.0 - 0.5 %    Neutrophils, Absolute 1.69 (L) 1.70 - 7.00 10*3/mm3    Lymphocytes, Absolute 1.54 0.70 - 3.10 10*3/mm3    Monocytes, Absolute 0.37 0.10 - 0.90 10*3/mm3    Eosinophils, Absolute 0.09 0.00 - 0.40 10*3/mm3    Basophils, Absolute 0.02 0.00 - 0.20 10*3/mm3    Immature Grans, Absolute 0.03 0.00 - 0.05 10*3/mm3    nRBC 0.0 0.0 - 0.2 /100 WBC   Single High Sensitivity Troponin T    Collection Time: 03/15/24  3:49 PM    Specimen: Blood   Result Value Ref Range    HS Troponin T 28 (H) <22 ng/L         RADIOLOGY  XR Chest 1 View    Result Date: 3/15/2024  ONE-VIEW PORTABLE CHEST  HISTORY: Shortness of breath. Wheezing.  FINDINGS: The lungs are well expanded and clear. There is several calcified right hilar lymph nodes and multiple calcified right paratracheal lymph nodes. The heart size is normal and there is no acute disease or change from 5/14/2023.  This report was finalized on 3/15/2024 1:33 PM by Dr. Azeem Frazier M.D on Workstation: [id number]         MEDICATIONS GIVEN IN ER  Medications   sodium chloride 0.9 % flush 10 mL (has no administration in time range)   predniSONE (DELTASONE) tablet 60 mg (60 mg Oral Given 3/15/24 1635)   albuterol (PROVENTIL) nebulizer solution 0.083% 2.5 mg/3mL (2.5 mg Nebulization Given 3/15/24 1655)         ORDERS PLACED DURING THIS VISIT:  Orders Placed This Encounter   Procedures    Respiratory Panel PCR w/COVID-19(SARS-CoV-2) GIL/CORY/JOVANA/PAD/COR/ELIZABETH In-House, NP Swab in UTM/VTM, 2 HR TAT - Swab, Nasopharynx    XR Chest 1 View    [place] Draw    Comprehensive Metabolic Panel    BNP    Single High Sensitivity Troponin T    CBC Auto Differential    Single High Sensitivity Troponin T    NPO Diet NPO Type: Strict NPO     Undress & Gown    Continuous Pulse Oximetry    Vital Signs    IP General Consult (Use specialty-specific consult if known)    Oxygen Therapy- Nasal Cannula; Titrate 1-6 LPM Per SpO2; 90 - 95%    ECG 12 Lead ED Triage Standing Order; SOA    Insert Peripheral IV    CBC & Differential    Green Top (Gel)    Lavender Top    Gold Top - SST    Light Blue Top         OUTPATIENT MEDICATION MANAGEMENT:  Current Facility-Administered Medications Ordered in Epic   Medication Dose Route Frequency Provider Last Rate Last Admin    sodium chloride 0.9 % flush 10 mL  10 mL Intravenous PRN Edmundo Walls MD         Current Outpatient Medications Ordered in Epic   Medication Sig Dispense Refill    acetaminophen (TYLENOL) 500 MG tablet Take 1 tablet by mouth Every 6 (Six) Hours As Needed for Mild Pain . 100 tablet 0    albuterol sulfate  (90 Base) MCG/ACT inhaler Inhale 2 puffs Every 4 (Four) Hours As Needed for Wheezing. 1 g 0    Alrex 0.2 % suspension SHAKE LIQUID AND INSTILL 1 DROP IN BOTH EYES FOUR TIMES DAILY      amLODIPine (NORVASC) 2.5 MG tablet Take 1 tablet by mouth Daily.      atorvastatin (LIPITOR) 40 MG tablet Daily.      cefadroxil (DURICEF) 500 MG capsule Take 1 capsule by mouth 2 (Two) Times a Day.      clopidogrel (PLAVIX) 75 MG tablet Take 1 tablet by mouth.      docusate sodium 100 MG capsule docusate sodium 100 mg capsule   Take 1 capsule every day by oral route.      esomeprazole (nexIUM) 40 MG capsule Take 1 capsule by mouth Every Morning Before Breakfast.      HYDROcodone-acetaminophen (NORCO) 5-325 MG per tablet Take 1 tablet by mouth Every 6 (Six) Hours As Needed.      irbesartan-hydrochlorothiazide (AVALIDE) 300-12.5 MG tablet       loratadine (CLARITIN) 10 MG tablet Take 1 tablet by mouth Daily. 30 tablet 0    Multiple Vitamins-Minerals (MULTIVITAMIN ADULTS 50+ PO) Take  by mouth Daily.      predniSONE (DELTASONE) 20 MG tablet 2 tablets by mouth daily for 5 days 10 tablet 0    pregabalin  (LYRICA) 50 MG capsule Take 2 capsules by mouth Daily.      sildenafil (REVATIO) 20 MG tablet TK 2 TS PO QD PRN  3    zolpidem (AMBIEN) 5 MG tablet Take 1 tablet by mouth every night at bedtime.           PROCEDURES  Procedures            PROGRESS, DATA ANALYSIS, CONSULTS, AND MEDICAL DECISION MAKING  All labs have been independently interpreted by me.  All radiology studies have been reviewed by me. All EKG's have been independently viewed and interpreted by me.  Discussion below represents my analysis of pertinent findings related to patient's condition, differential diagnosis, treatment plan and final disposition.    Differential diagnosis:   My differential diagnosis for dyspnea includes but is not limited to:  Asthma, COPD, pneumonia, pulmonary embolus, acute respiratory distress syndrome, pneumothorax, pleural effusion, pulmonary fibrosis, congestive heart failure, myocardial infarction, DKA, uremia, acidosis, sepsis, anemia, drug related, hyperventilation, CNS disease      Clinical Scores:                  ED Course as of 03/15/24 1812   Fri Mar 15, 2024   1325 Hemoglobin(!): 12.1 [JR]   1326 WBC: 3.74 [JR]   1326 Chest x-ray independently interpreted in PACS and demonstrates no infiltrate, no pleural effusion. [JR]   1327 EKG interpreted by me:  Time: 12:40 PM  Rate rhythm: Sinus rhythm, 74  AK: Normal  QRS: Normal axis  ST and T waves: Borderline inferior Q waves, nonspecific T wave changes laterally   [JR]   1539 First look: Patient reports that he has been having intermittent wheezing for a couple weeks now.  He states that his PCP had given him albuterol to try a couple months ago but it has not really helped the symptoms.  He denies significant shortness of breath.  No chest pain, no fever or chills.  He states he is also been dealing with pain in his back and he scheduled to get an epidural injection on Monday.  He denies leg pain or leg swelling.    On exam he appears well, no respiratory distress, no  wheezing or stridor, no dysphonia.  There is no peripheral edema or leg tenderness bilaterally.    I explained that his initial evaluation including chest x-ray appears reassuring, initial cardiac troponin is indeterminate, repeat troponin will be pending and Dr. Hill will resume his care momentarily. [JR]   1621 HS Troponin T(!): 28  Stable, chronic [RS]   1621 Hemoglobin(!): 12.1  chronic [RS]   1621 BUN: 17 [RS]   1621 Creatinine: 1.19 [RS]   1621 Sodium: 137 [RS]   1621 Potassium: 3.7 [RS]   1621 ALT (SGPT): 32 [RS]   1621 AST (SGOT): 28 [RS]   1621 Total Bilirubin: 0.7 [RS]   1621 proBNP: 43.3 [RS]   1624 HS Troponin T(!): 28 [RS]   1626 RADIOLOGY      Study: Single view chest  Findings: No pneumothorax or large focal infiltrate  I independently viewed and interpreted these images contemporaneously with treatment.    [RS]   1626 EKG           EKG time: 1240  Rhythm/Rate: 75, sinus  P waves and IL: VIRAL within normal limits  QRS, axis: Narrow  ST and T waves: No STEMI/inferolateral T wave inversions    Interpreted Contemporaneously by me, independently viewed  Comparison: 5/15/2023-no acute change   [RS]   1635 CONSULT        Provider: Dr. Mg Nevarez-PCP    Discussion: Patient history, ED physician evaluation.  Agrees with plan for steroids and loratadine with outpatient follow-up early next week.    Agreeable c treatment and planned disposition.         [RS]   1811 There is apparently a significant delay in getting patient's respiratory panel.  I see no indication that he has to remain in the ED.  We can call him later with the result.  Patient will be discharged at this time. [RS]      ED Course User Index  [JR] Edmundo Walls MD  [RS] Girish Hill MD         Prescription drug monitoring program review:     AS OF 18:12 EDT VITALS:    BP - 150/73  HR - 64  TEMP - 97.3 °F (36.3 °C)  O2 SATS - 98%    COMPLEXITY OF CARE  Admission was considered but after careful review of the patient's presentation,  physical examination, diagnostic results, and response to treatment the patient may be safely discharged with outpatient follow-up.      DIAGNOSIS  Final diagnoses:   Wheezing         DISPOSITION  ED Disposition       ED Disposition   Discharge    Condition   Stable    Comment   --                  DISCHARGE    Patient discharged in stable condition.    Reviewed implications of results, diagnosis, meds, responsibility to follow up, warning signs and symptoms of possible worsening, potential complications and reasons to return to ER.    Patient/Family voiced understanding of above instructions.    Discussed plan for discharge, as there is no emergent indication for admission. Patient referred to primary care provider for regular health maintenance. Pt/family is agreeable and understands need for follow up and possible repeat testing.  Pt is aware that discharge does not mean that nothing is wrong but it indicates no emergency is present that requires admission and they must continue care with follow-up as given below or physician of their choice.     FOLLOW-UP  Mg Clark MD  [address] [address]  [phone]    Schedule an appointment as soon as possible for a visit in 3 days           Medication List        New Prescriptions      loratadine 10 MG tablet  Commonly known as: CLARITIN  Take 1 tablet by mouth Daily.     predniSONE 20 MG tablet  Commonly known as: DELTASONE  2 tablets by mouth daily for 5 days               Where to Get Your Medications        These medications were sent to [facility] [id number] - [place] [address] AT [facility] [address]  - [phone]  - [phone] [address], [place] [address]      Phone: [phone]   loratadine 10 MG tablet  predniSONE 20 MG tablet           Please note that portions of this document were completed with a voice recognition program.    Note Disclaimer: At [facility], we believe  that sharing information builds trust and better relationships. You are receiving this note because you recently visited [facility]. It is possible you will see health information before a provider has talked with you about it. This kind of information can be easy to misunderstand. To help you fully understand what it means for your health, we urge you to discuss this note with your provider.         Girish Hill MD  03/15/24 [id number]

## 2024-07-08 ENCOUNTER — LAB (OUTPATIENT)
Dept: LAB | Facility: HOSPITAL | Age: 84
End: 2024-07-08
Payer: MEDICARE

## 2024-07-08 DIAGNOSIS — D47.2 SMOLDERING MYELOMA: ICD-10-CM

## 2024-07-08 DIAGNOSIS — D70.8 OTHER NEUTROPENIA: ICD-10-CM

## 2024-07-08 DIAGNOSIS — D63.1 ANEMIA IN STAGE 3A CHRONIC KIDNEY DISEASE: ICD-10-CM

## 2024-07-08 DIAGNOSIS — N18.31 ANEMIA IN STAGE 3A CHRONIC KIDNEY DISEASE: ICD-10-CM

## 2024-07-08 LAB
ALBUMIN SERPL-MCNC: 4 G/DL (ref 3.5–5.2)
ALBUMIN/GLOB SERPL: 1.1 G/DL
ALP SERPL-CCNC: 64 U/L (ref 39–117)
ALT SERPL W P-5'-P-CCNC: 30 U/L (ref 1–41)
ANION GAP SERPL CALCULATED.3IONS-SCNC: 7.8 MMOL/L (ref 5–15)
AST SERPL-CCNC: 32 U/L (ref 1–40)
BASOPHILS # BLD AUTO: 0.02 10*3/MM3 (ref 0–0.2)
BASOPHILS NFR BLD AUTO: 0.5 % (ref 0–1.5)
BILIRUB SERPL-MCNC: 0.8 MG/DL (ref 0–1.2)
BUN SERPL-MCNC: 17 MG/DL (ref 8–23)
BUN/CREAT SERPL: 14.8 (ref 7–25)
CALCIUM SPEC-SCNC: 9.3 MG/DL (ref 8.6–10.5)
CHLORIDE SERPL-SCNC: 103 MMOL/L (ref 98–107)
CO2 SERPL-SCNC: 27.2 MMOL/L (ref 22–29)
CREAT SERPL-MCNC: 1.15 MG/DL (ref 0.76–1.27)
DEPRECATED RDW RBC AUTO: 42.3 FL (ref 37–54)
EGFRCR SERPLBLD CKD-EPI 2021: 63.1 ML/MIN/1.73
EOSINOPHIL # BLD AUTO: 0.06 10*3/MM3 (ref 0–0.4)
EOSINOPHIL NFR BLD AUTO: 1.6 % (ref 0.3–6.2)
ERYTHROCYTE [DISTWIDTH] IN BLOOD BY AUTOMATED COUNT: 12.2 % (ref 12.3–15.4)
GLOBULIN UR ELPH-MCNC: 3.8 GM/DL
GLUCOSE SERPL-MCNC: 179 MG/DL (ref 65–99)
HCT VFR BLD AUTO: 37.5 % (ref 37.5–51)
HGB BLD-MCNC: 13.3 G/DL (ref 13–17.7)
IMM GRANULOCYTES # BLD AUTO: 0.01 10*3/MM3 (ref 0–0.05)
IMM GRANULOCYTES NFR BLD AUTO: 0.3 % (ref 0–0.5)
LYMPHOCYTES # BLD AUTO: 1.71 10*3/MM3 (ref 0.7–3.1)
LYMPHOCYTES NFR BLD AUTO: 45.5 % (ref 19.6–45.3)
MCH RBC QN AUTO: 34.1 PG (ref 26.6–33)
MCHC RBC AUTO-ENTMCNC: 35.5 G/DL (ref 31.5–35.7)
MCV RBC AUTO: 96.2 FL (ref 79–97)
MONOCYTES # BLD AUTO: 0.36 10*3/MM3 (ref 0.1–0.9)
MONOCYTES NFR BLD AUTO: 9.6 % (ref 5–12)
NEUTROPHILS NFR BLD AUTO: 1.6 10*3/MM3 (ref 1.7–7)
NEUTROPHILS NFR BLD AUTO: 42.5 % (ref 42.7–76)
NRBC BLD AUTO-RTO: 0 /100 WBC (ref 0–0.2)
PLATELET # BLD AUTO: 169 10*3/MM3 (ref 140–450)
PMV BLD AUTO: 9.9 FL (ref 6–12)
POTASSIUM SERPL-SCNC: 3.8 MMOL/L (ref 3.5–5.2)
PROT SERPL-MCNC: 7.8 G/DL (ref 6–8.5)
RBC # BLD AUTO: 3.9 10*6/MM3 (ref 4.14–5.8)
SODIUM SERPL-SCNC: 138 MMOL/L (ref 136–145)
WBC NRBC COR # BLD AUTO: 3.76 10*3/MM3 (ref 3.4–10.8)

## 2024-07-08 PROCEDURE — 36415 COLL VENOUS BLD VENIPUNCTURE: CPT

## 2024-07-08 PROCEDURE — 80053 COMPREHEN METABOLIC PANEL: CPT

## 2024-07-08 PROCEDURE — 85025 COMPLETE CBC W/AUTO DIFF WBC: CPT

## 2024-07-09 LAB
ALBUMIN SERPL ELPH-MCNC: 3.8 G/DL (ref 2.9–4.4)
ALBUMIN/GLOB SERPL: 1.1 {RATIO} (ref 0.7–1.7)
ALPHA1 GLOB SERPL ELPH-MCNC: 0.2 G/DL (ref 0–0.4)
ALPHA2 GLOB SERPL ELPH-MCNC: 0.6 G/DL (ref 0.4–1)
B-GLOBULIN SERPL ELPH-MCNC: 0.9 G/DL (ref 0.7–1.3)
GAMMA GLOB SERPL ELPH-MCNC: 2 G/DL (ref 0.4–1.8)
GLOBULIN SER-MCNC: 3.7 G/DL (ref 2.2–3.9)
IGA SERPL-MCNC: 24 MG/DL (ref 61–437)
IGG SERPL-MCNC: 2453 MG/DL (ref 603–1613)
IGM SERPL-MCNC: 47 MG/DL (ref 15–143)
INTERPRETATION SERPL IEP-IMP: ABNORMAL
KAPPA LC FREE SER-MCNC: 37.8 MG/L (ref 3.3–19.4)
KAPPA LC FREE/LAMBDA FREE SER: 7.88 {RATIO} (ref 0.26–1.65)
LABORATORY COMMENT REPORT: ABNORMAL
LAMBDA LC FREE SERPL-MCNC: 4.8 MG/L (ref 5.7–26.3)
M PROTEIN SERPL ELPH-MCNC: 1.8 G/DL
PROT SERPL-MCNC: 7.5 G/DL (ref 6–8.5)

## 2024-07-09 NOTE — PROGRESS NOTES
Subjective     REASON FOR FOLLOW-UP:  Chronic leukopenia, smoldering myeloma, anemia                               REQUESTING PHYSICIAN:  Eduardo    RECORDS OBTAINED:  Records of the patients history including those obtained from the referring provider were reviewed and summarized in detail.      History of Present Illness   This is a very pleasant 84-year-old man returning today for follow-up of smoldering myeloma.  He is feeling well today with no significant fatigue, lightheadedness, bruising, bleeding, or weight loss.  He has been having low back pain and MRI showed degenerative changes and spinal stenosis, no evidence of malignancy.  He has been undergoing epidural injections which are helping some.    Past Medical History:   Diagnosis Date    Arthritis     hands    CAD (coronary artery disease)     COVID-19     Diabetes     GERD (gastroesophageal reflux disease)     H/O MGUS (monoclonal gammopathy of unknown significance)     History of colon polyps     History of herpes zoster 2007    History of pneumonia     Hyperlipidemia     Hypertension     Neutropenia     H/O hereditary neutropenia    Prostate cancer 2007    Stroke 1997        Past Surgical History:   Procedure Laterality Date    ANAL FISSURECTOMY      CATARACT EXTRACTION Bilateral 2009    COLONOSCOPY  06/2014    cecal polyp, sigmoid diverticulosis    ENDOSCOPY  2001    OTHER SURGICAL HISTORY  2007    Decortication and right lower lobe with resection    PROSTATE BIOPSY  2007    THORACOSCOPY          Current Outpatient Medications on File Prior to Visit   Medication Sig Dispense Refill    acetaminophen (TYLENOL) 500 MG tablet Take 1 tablet by mouth Every 6 (Six) Hours As Needed for Mild Pain . 100 tablet 0    albuterol sulfate  (90 Base) MCG/ACT inhaler Inhale 2 puffs Every 4 (Four) Hours As Needed for Wheezing. 1 g 0    Alrex 0.2 % suspension SHAKE LIQUID AND INSTILL 1 DROP IN BOTH EYES FOUR TIMES DAILY      amLODIPine (NORVASC) 2.5 MG tablet Take  1 tablet by mouth Daily.      atorvastatin (LIPITOR) 40 MG tablet Daily.      cefadroxil (DURICEF) 500 MG capsule Take 1 capsule by mouth 2 (Two) Times a Day.      clopidogrel (PLAVIX) 75 MG tablet Take 1 tablet by mouth.      docusate sodium 100 MG capsule docusate sodium 100 mg capsule   Take 1 capsule every day by oral route.      esomeprazole (nexIUM) 40 MG capsule Take 1 capsule by mouth Every Morning Before Breakfast.      HYDROcodone-acetaminophen (NORCO) 5-325 MG per tablet Take 1 tablet by mouth Every 6 (Six) Hours As Needed.      irbesartan-hydrochlorothiazide (AVALIDE) 300-12.5 MG tablet       loratadine (CLARITIN) 10 MG tablet Take 1 tablet by mouth Daily. 30 tablet 0    Multiple Vitamins-Minerals (MULTIVITAMIN ADULTS 50+ PO) Take  by mouth Daily.      predniSONE (DELTASONE) 20 MG tablet 2 tablets by mouth daily for 5 days 10 tablet 0    pregabalin (LYRICA) 50 MG capsule Take 2 capsules by mouth Daily.      sildenafil (REVATIO) 20 MG tablet TK 2 TS PO QD PRN  3    zolpidem (AMBIEN) 5 MG tablet Take 1 tablet by mouth every night at bedtime.       No current facility-administered medications on file prior to visit.        ALLERGIES:    Allergies   Allergen Reactions    Fluoxetine Unknown - Low Severity    Gabapentin Unknown - Low Severity        Social History     Socioeconomic History    Marital status:      Spouse name: Candace    Years of education: High school   Tobacco Use    Smoking status: Former   Vaping Use    Vaping status: Never Used   Substance and Sexual Activity    Alcohol use: Yes     Comment: Occasionally    Drug use: No        Family History   Problem Relation Age of Onset    Uterine cancer Mother     Hypertension Other     Hyperlipidemia Other     Arthritis Other     Stroke Other     Heart disease Sister     Hypertension Brother         Review of Systems   Constitutional:  Negative for activity change and fatigue.   HENT: Negative.     Eyes: Negative.    Respiratory: Negative.    "  Cardiovascular: Negative.    Gastrointestinal: Negative.    Genitourinary: Negative.    Musculoskeletal:  Positive for arthralgias, back pain and gait problem.   Skin: Negative.    Allergic/Immunologic: Negative.    Hematological: Negative.    Psychiatric/Behavioral: Negative.     ROS unchanged-7/15/2024      Objective     Vitals:    07/15/24 0952   BP: 120/66   Pulse: 99   Resp: 16   Temp: 98.2 °F (36.8 °C)   TempSrc: Oral   SpO2: 95%   Weight: 86.6 kg (191 lb)   Height: 180.3 cm (70.98\")   PainSc: 0-No pain           7/15/2024     9:53 AM   Current Status   ECOG score 0       Physical Exam    Con: pleasant, well-appearing man  HEENT: no icterus, no thrush, moist membranes  CV: RRR, S1S2  RESP: CTA bilat, no wheezing  GI: soft, nontender, no splenomegaly, +BS  MS: no edema, ambulates slight stooped posture  SKIN: no petechiae or bruising  NEURO: alert and oriented x3, normal strength, normal muscle tone  PSYCH: normal mood and affect      RECENT LABS:  Hematology WBC   Date Value Ref Range Status   07/08/2024 3.76 3.40 - 10.80 10*3/mm3 Final     RBC   Date Value Ref Range Status   07/08/2024 3.90 (L) 4.14 - 5.80 10*6/mm3 Final     Hemoglobin   Date Value Ref Range Status   07/08/2024 13.3 13.0 - 17.7 g/dL Final     Hematocrit   Date Value Ref Range Status   07/08/2024 37.5 37.5 - 51.0 % Final     Platelets   Date Value Ref Range Status   07/08/2024 169 140 - 450 10*3/mm3 Final        Lab Results   Component Value Date    GLUCOSE 179 (H) 07/08/2024    BUN 17 07/08/2024    CREATININE 1.15 07/08/2024    EGFRIFAFRI 72 09/30/2021    BCR 14.8 07/08/2024    K 3.8 07/08/2024    CO2 27.2 07/08/2024    CALCIUM 9.3 07/08/2024    PROTENTOTREF 7.5 07/08/2024    ALBUMIN 3.8 07/08/2024    ALBUMIN 4.0 07/08/2024    LABIL2 1.1 07/08/2024    AST 32 07/08/2024    ALT 30 07/08/2024             Assessment & Plan     * Chronic leukopenia:   ANC has run borderline low for a number of years.  This has been previously attributed to " ethnic neutropenia versus a side effect of previous pelvic irradiation for treatment of prostate cancer.    Bone marrow exam was performed 1/11/19.  The bone marrow was mildly hypercellular 40% with involvement of a plasma cell dyscrasia 8% by aspirate and 15% by core biopsy.  Plasma cells were monoclonal kappa by flow cytometry.  There was trilineage hematopoiesis.  [place] red stain was negative for amyloid deposition.  Cytogenetics were normal; complete FISH panel could not be performed.  No dyspoiesis of the white cells noted.  ANC stable 1.6    *Smoldering myeloma:    increased number of plasma cells by bone marrow biopsy 8% aspirate/15% core biopsy monoclonal kappa.  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    CBC and CMP stable today with no endorgan damage; M spike 1.8 g/dL, light chain ratio 7.8     *Mild normocytic anemia:    the patient has stage II-III 3A chronic kidney disease as the likely cause.  Iron studies, B12, folic acid without obvious deficiency.    Hemoglobin currently stable 13.3    *Lumbar back pain  MRI lumbar spine 1/4/2024 reviewed-multilevel degenerative disease of the lumbar spine, moderate to severe L4-L5 and severe L5-S1 foraminal stenosis, no evidence of malignancy  He is receiving epidural injections      Hematology plan/recommendations:  Continue observation-6-month follow-up with repeat CBC CMP SPEP/LILLY and free light chain ratio requested

## 2024-07-15 ENCOUNTER — OFFICE VISIT (OUTPATIENT)
Dept: ONCOLOGY | Facility: CLINIC | Age: 84
End: 2024-07-15
Payer: MEDICARE

## 2024-07-15 VITALS
WEIGHT: 191 LBS | SYSTOLIC BLOOD PRESSURE: 120 MMHG | DIASTOLIC BLOOD PRESSURE: 66 MMHG | HEART RATE: 99 BPM | TEMPERATURE: 98.2 F | RESPIRATION RATE: 16 BRPM | HEIGHT: 71 IN | BODY MASS INDEX: 26.74 KG/M2 | OXYGEN SATURATION: 95 %

## 2024-07-15 DIAGNOSIS — D70.8 OTHER NEUTROPENIA: ICD-10-CM

## 2024-07-15 DIAGNOSIS — D63.1 ANEMIA IN STAGE 3A CHRONIC KIDNEY DISEASE: ICD-10-CM

## 2024-07-15 DIAGNOSIS — D47.2 SMOLDERING MYELOMA: Primary | ICD-10-CM

## 2024-07-15 DIAGNOSIS — N18.31 ANEMIA IN STAGE 3A CHRONIC KIDNEY DISEASE: ICD-10-CM

## 2024-07-15 PROCEDURE — 99214 OFFICE O/P EST MOD 30 MIN: CPT | Performed by: INTERNAL MEDICINE

## 2024-07-15 PROCEDURE — 1126F AMNT PAIN NOTED NONE PRSNT: CPT | Performed by: INTERNAL MEDICINE

## 2024-07-29 NOTE — PROGRESS NOTES
Patient ID: Antoine Story is a 84 y.o. male is being seen for consultation today at the request of Mg Clark MD for lumbar spinal stenosis.    Imaging: Last MRI of the lumbar spine performed on 01/04/2024    Subjective     The patient is here in regards to   Chief Complaint   Patient presents with    Back Pain       History of Present Illness  Antoine has chronic low back pain.  He is also had right-sided hip issues and has had a previous right-sided hip injection with orthopedic surgery that did not give him significant relief.  He had epidural steroid injections which have been moderately helpful in the past and has a radiofrequency ablation that was not helpful at all for him.  He has bilateral leg pain and his anterior thigh area and back pain.  He notes that the majority of his pain comes from back pain.      While in the room and during my examination of the patient I wore a mask and eye protection.  I washed my hands before and after this patient encounter.  The patient was also wearing a mask.    The following portions of the patient's history were reviewed and updated as appropriate: allergies, current medications, past family history, past medical history, past social history, past surgical history and problem list.    Review of Systems   Constitutional:  Negative for fever.   HENT:  Negative for congestion and tinnitus.    Eyes:  Negative for visual disturbance.   Respiratory:  Negative for chest tightness and shortness of breath.    Cardiovascular:  Negative for chest pain.   Gastrointestinal:  Positive for diarrhea. Negative for nausea and vomiting.   Endocrine: Negative for cold intolerance and heat intolerance.   Genitourinary:  Negative for difficulty urinating.   Musculoskeletal:  Positive for back pain and gait problem. Negative for neck pain and neck stiffness.   Skin:  Negative for rash.   Allergic/Immunologic: Negative for food allergies.   Neurological:  Positive for weakness. Negative for  dizziness, light-headedness, numbness and headaches.   Hematological:  Does not bruise/bleed easily.   Psychiatric/Behavioral:  Positive for decreased concentration. Negative for confusion.         Past Medical History:   Diagnosis Date    Arthritis     hands    CAD (coronary artery disease)     COVID-19     Diabetes     GERD (gastroesophageal reflux disease)     H/O MGUS (monoclonal gammopathy of unknown significance)     History of colon polyps     History of herpes zoster 2007    History of pneumonia     Hyperlipidemia     Hypertension     Neutropenia     H/O hereditary neutropenia    Prostate cancer 2007    Stroke 1997       Allergies   Allergen Reactions    Fluoxetine Unknown - Low Severity    Gabapentin Unknown - Low Severity       Family History   Problem Relation Age of Onset    Uterine cancer Mother     Hypertension Other     Hyperlipidemia Other     Arthritis Other     Stroke Other     Heart disease Sister     Hypertension Brother        Social History     Socioeconomic History    Marital status:      Spouse name: Candace    Years of education: High school   Tobacco Use    Smoking status: Former   Vaping Use    Vaping status: Never Used   Substance and Sexual Activity    Alcohol use: Yes     Comment: Occasionally    Drug use: No       Past Surgical History:   Procedure Laterality Date    ANAL FISSURECTOMY      CATARACT EXTRACTION Bilateral 2009    COLONOSCOPY  06/2014    cecal polyp, sigmoid diverticulosis    ENDOSCOPY  2001    OTHER SURGICAL HISTORY  2007    Decortication and right lower lobe with resection    PROSTATE BIOPSY  2007    THORACOSCOPY           Objective     Vitals:    07/30/24 1028   BP: 122/62   Pulse: 68   Resp: 16   Temp: 97.8 °F (36.6 °C)     Body mass index is 28.66 kg/m².    Physical Exam  Constitutional:       Appearance: Normal appearance.   HENT:      Head: Normocephalic and atraumatic.   Eyes:      Extraocular Movements: Extraocular movements intact.       Conjunctiva/sclera: Conjunctivae normal.      Pupils: Pupils are equal, round, and reactive to light.   Cardiovascular:      Rate and Rhythm: Normal rate and regular rhythm.      Pulses: Normal pulses.   Pulmonary:      Breath sounds: Normal breath sounds.   Abdominal:      Palpations: Abdomen is soft.   Musculoskeletal:         General: Normal range of motion.      Cervical back: Normal range of motion and neck supple.      Comments: GEE testing consistent with right-sided hip arthropathy   Skin:     General: Skin is warm and dry.   Neurological:      Mental Status: He is alert and oriented to person, place, and time.      Cranial Nerves: Cranial nerves 2-12 are intact.      Motor: Motor function is intact. No weakness or atrophy.      Coordination: Coordination is intact. Romberg sign negative. Romberg Test normal.      Gait: Gait is intact. Gait normal.      Deep Tendon Reflexes: Reflexes are normal and symmetric.      Reflex Scores:       Tricep reflexes are 2+ on the right side and 2+ on the left side.       Bicep reflexes are 2+ on the right side and 2+ on the left side.       Brachioradialis reflexes are 2+ on the right side and 2+ on the left side.       Patellar reflexes are 2+ on the right side and 2+ on the left side.       Achilles reflexes are 2+ on the right side and 2+ on the left side.  Psychiatric:         Speech: Speech normal.         Neurologic Exam     Mental Status   Oriented to person, place, and time.   Attention: normal. Concentration: normal.   Speech: speech is normal   Level of consciousness: alert    Cranial Nerves   Cranial nerves II through XII intact.     CN III, IV, VI   Pupils are equal, round, and reactive to light.    Motor Exam   Muscle bulk: normal  Overall muscle tone: normal    Strength   Strength 5/5 except as noted.     Sensory Exam   Light touch normal.     Gait, Coordination, and Reflexes     Gait  Gait: normal    Coordination   Romberg: negative    Reflexes   Reflexes  2+ except as noted.   Right brachioradialis: 2+  Left brachioradialis: 2+  Right biceps: 2+  Left biceps: 2+  Right triceps: 2+  Left triceps: 2+  Right patellar: 2+  Left patellar: 2+  Right achilles: 2+  Left achilles: 2+      Assessment & Plan   Independent Review of Radiographic Studies:      I personally reviewed the images from the following studies.    MR: MRI of the lumbar spine wo contrast was reviewed and shows degenerative disc disease at L1-2, L2-3, L3-4, L4-5, L5-S1 with grade 1 spondylolisthesis at L5-S1 causing severe foraminal stenosis.  Moderate foraminal stenosis at each of the other levels with collapsed disc space and bone-on-bone contact.  There is Modic endplate changes at L5-S1    Assessment/Plan: He does have chronic back pain this is likely due to degenerative disc disease as well as spondylolisthesis at L5-S1.  It has minimal nerve pain.  Most of his issues also stem from ambulation difficulties and that could be from his right-sided hip issues.  I would recommend that he see orthopedic surgery and try another round of injections in his right hip.  Given his age, I do not think that he is an ideal candidate for an L5-S1 ALIF although that may be able to significantly improve his back pain.  In the meantime, I recommended that he gradually wean himself off the hydrocodone and try using CBD and Cymbalta instead.  I hope that physical therapy will be effective for him.    Medical Decision Making:      X-ray bilateral hips  Cymbalta  Physical therapy           Diagnoses and all orders for this visit:    1. Pain of right hip (Primary)  -     XR Hips Bilateral With or Without Pelvis 2 View; Future  -     Ambulatory Referral to Orthopedic Surgery    2. Spondylolisthesis at L5-S1 level  -     Ambulatory Referral to Physical Therapy    Other orders  -     DULoxetine (Cymbalta) 20 MG capsule; Take 1 capsule by mouth 2 (Two) Times a Day.  Dispense: 60 capsule; Refill: 5             Patient  Instructions/Recommendations:    Follow-up in 6 months      Rey Raymundo MD  07/30/24  11:01 EDT

## 2024-07-30 ENCOUNTER — OFFICE VISIT (OUTPATIENT)
Dept: NEUROSURGERY | Facility: CLINIC | Age: 84
End: 2024-07-30
Payer: MEDICARE

## 2024-07-30 VITALS
BODY MASS INDEX: 28.77 KG/M2 | HEART RATE: 68 BPM | SYSTOLIC BLOOD PRESSURE: 122 MMHG | RESPIRATION RATE: 16 BRPM | HEIGHT: 71 IN | WEIGHT: 205.5 LBS | TEMPERATURE: 97.8 F | DIASTOLIC BLOOD PRESSURE: 62 MMHG

## 2024-07-30 DIAGNOSIS — M43.17 SPONDYLOLISTHESIS AT L5-S1 LEVEL: ICD-10-CM

## 2024-07-30 DIAGNOSIS — M25.551 PAIN OF RIGHT HIP: Primary | ICD-10-CM

## 2024-07-30 RX ORDER — IBUPROFEN 200 MG
200 TABLET ORAL EVERY 6 HOURS PRN
COMMUNITY

## 2024-07-30 RX ORDER — CLOTRIMAZOLE 1 %
CREAM (GRAM) TOPICAL
COMMUNITY
Start: 2024-07-29

## 2024-07-30 RX ORDER — DULOXETIN HYDROCHLORIDE 20 MG/1
20 CAPSULE, DELAYED RELEASE ORAL 2 TIMES DAILY
Qty: 60 CAPSULE | Refills: 5 | Status: SHIPPED | OUTPATIENT
Start: 2024-07-30

## 2024-07-30 RX ORDER — FLUTICASONE PROPIONATE 50 MCG
SPRAY, SUSPENSION (ML) NASAL
COMMUNITY
Start: 2024-07-26

## 2024-08-01 ENCOUNTER — TELEPHONE (OUTPATIENT)
Dept: NEUROSURGERY | Facility: CLINIC | Age: 84
End: 2024-08-01

## 2024-08-01 NOTE — TELEPHONE ENCOUNTER
Hub staff attempted to follow warm transfer process and was unsuccessful     Caller: KELLEE GLASS    Relationship to patient:     Best call back number: [phone]    Patient is needing: TO RESCHEDULE HIS XRAY APPT FOR THIS MORNING, 8-1-24

## 2024-08-13 ENCOUNTER — OFFICE VISIT (OUTPATIENT)
Dept: ORTHOPEDIC SURGERY | Facility: CLINIC | Age: 84
End: 2024-08-13
Payer: MEDICARE

## 2024-08-13 VITALS — BODY MASS INDEX: 28.3 KG/M2 | WEIGHT: 197.7 LBS | HEIGHT: 70 IN | TEMPERATURE: 97.5 F

## 2024-08-13 DIAGNOSIS — M16.11 PRIMARY OSTEOARTHRITIS OF RIGHT HIP: Primary | ICD-10-CM

## 2024-08-13 PROCEDURE — 99204 OFFICE O/P NEW MOD 45 MIN: CPT | Performed by: ORTHOPAEDIC SURGERY

## 2024-08-13 PROCEDURE — 1160F RVW MEDS BY RX/DR IN RCRD: CPT | Performed by: ORTHOPAEDIC SURGERY

## 2024-08-13 PROCEDURE — 1159F MED LIST DOCD IN RCRD: CPT | Performed by: ORTHOPAEDIC SURGERY

## 2024-08-13 RX ORDER — PREGABALIN 150 MG/1
150 CAPSULE ORAL ONCE
OUTPATIENT
Start: 2024-08-13 | End: 2024-08-13

## 2024-08-13 RX ORDER — MELOXICAM 7.5 MG/1
7.5 TABLET ORAL ONCE
OUTPATIENT
Start: 2024-08-13

## 2024-08-13 RX ORDER — CHLORHEXIDINE GLUCONATE 500 MG/1
CLOTH TOPICAL SEE ADMIN INSTRUCTIONS
OUTPATIENT
Start: 2024-08-13

## 2024-08-13 RX ORDER — ACETAMINOPHEN 10 MG/ML
1000 INJECTION, SOLUTION INTRAVENOUS ONCE
OUTPATIENT
Start: 2024-08-13 | End: 2024-08-13

## 2024-08-13 NOTE — PROGRESS NOTES
Patient: Antoine Story    YOB: 1940    Medical Record Number: [id number]    Chief Complaints:  Right hip pain    History of Present Illness:     84 y.o. male patient who comes in today for evaluation of a new complaint of  righthip pain. Denies any discreet precipitating event or factor.   The pain is moderate,persistent and aching.  The pain is worse with prolonged standing or walking.  Rest helps.  Patient states she has had symptoms for years.  Got worse recently.  Pain is in the groin and front of the thigh.  Worse with prolonged standing.  States he saw hip specialist sometime ago did injection did not really help thought was maybe more his back he had a radiofrequency ablation done which did not help.  He states he then saw neurosurgery, Dr. Raymundo who did not feel that it was coming from his back but his hip and was referred here.  Patient states he has tried activity modification as well as over-the-counter medicines continues to be symptomatic.  Feels symptoms are limiting his normal daily activities and overall quality life.    Denies any shooting pain down the leg, weakness, numbness or paresthesias.  Denies any fever shortness of breath.    Allergies:   Allergies   Allergen Reactions    Fluoxetine Unknown - Low Severity    Gabapentin Unknown - Low Severity       Medications:     Home Medications:  Current Outpatient Medications on File Prior to Visit   Medication Sig Dispense Refill    albuterol sulfate  (90 Base) MCG/ACT inhaler Inhale 2 puffs Every 4 (Four) Hours As Needed for Wheezing. 1 g 0    amLODIPine (NORVASC) 2.5 MG tablet Take 1 tablet by mouth Daily.      atorvastatin (LIPITOR) 40 MG tablet Daily.      clopidogrel (PLAVIX) 75 MG tablet Take 1 tablet by mouth.      clotrimazole (LOTRIMIN) 1 % cream       docusate sodium 100 MG capsule docusate sodium 100 mg capsule   Take 1 capsule every day by oral route.      DULoxetine (Cymbalta) 20 MG capsule Take 1 capsule by mouth 2  (Two) Times a Day. 60 capsule 5    esomeprazole (nexIUM) 40 MG capsule Take 1 capsule by mouth Every Morning Before Breakfast.      fluticasone (FLONASE) 50 MCG/ACT nasal spray       HYDROcodone-acetaminophen (NORCO) 5-325 MG per tablet Take 1 tablet by mouth Every 6 (Six) Hours As Needed.      ibuprofen (ADVIL,MOTRIN) 200 MG tablet Take 1 tablet by mouth Every 6 (Six) Hours As Needed for Mild Pain.      irbesartan-hydrochlorothiazide (AVALIDE) 300-12.5 MG tablet       loratadine (CLARITIN) 10 MG tablet Take 1 tablet by mouth Daily. 30 tablet 0    metFORMIN (GLUCOPHAGE) 1000 MG tablet Take 1 tablet by mouth Daily.      Multiple Vitamins-Minerals (MULTIVITAMIN ADULTS 50+ PO) Take  by mouth Daily.      pregabalin (LYRICA) 50 MG capsule Take 2 capsules by mouth Daily.      sildenafil (REVATIO) 20 MG tablet TK 2 TS PO QD PRN  3    acetaminophen (TYLENOL) 500 MG tablet Take 1 tablet by mouth Every 6 (Six) Hours As Needed for Mild Pain . (Patient not taking: Reported on 7/30/2024) 100 tablet 0    zolpidem (AMBIEN) 5 MG tablet Take 1 tablet by mouth every night at bedtime. (Patient not taking: Reported on 7/30/2024)      [DISCONTINUED] metFORMIN (GLUCOPHAGE) 1000 MG tablet        No current facility-administered medications on file prior to visit.     Past Medical History:   Diagnosis Date    Arthritis     hands    CAD (coronary artery disease)     COVID-19     Diabetes     GERD (gastroesophageal reflux disease)     H/O MGUS (monoclonal gammopathy of unknown significance)     History of colon polyps     History of herpes zoster 2007    History of pneumonia     Hyperlipidemia     Hypertension     Neutropenia     H/O hereditary neutropenia    Prostate cancer 2007    Stroke 1997     Past Surgical History:   Procedure Laterality Date    ANAL FISSURECTOMY      CATARACT EXTRACTION Bilateral 2009    COLONOSCOPY  06/2014    cecal polyp, sigmoid diverticulosis    ENDOSCOPY  2001    OTHER SURGICAL HISTORY  2007    Decortication and  "right lower lobe with resection    PROSTATE BIOPSY  2007    THORACOSCOPY       Social History     Occupational History    Occupation: Paint contractor     Employer: RETIRED   Tobacco Use    Smoking status: Former    Smokeless tobacco: Not on file   Vaping Use    Vaping status: Never Used   Substance and Sexual Activity    Alcohol use: Yes     Comment: Occasionally    Drug use: No    Sexual activity: Defer      Social History     Social History Narrative    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 The patient blows a trumpet and enjoys vacationing at [place]. 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     Family History   Problem Relation Age of Onset    Uterine cancer Mother     Hypertension Other     Hyperlipidemia Other     Arthritis Other     Stroke Other     Heart disease Sister     Hypertension Brother        Review of Systems:      Constitutional: Denies fever, shaking or chills         All other pertinent positives and negatives as noted above in HPI.    Physical Exam: 84 y.o. male  Vitals:    08/13/24 1048   Temp: 97.5 °F (36.4 °C)   TempSrc: Temporal   Weight: 89.7 kg (197 lb 11.2 oz)   Height: 177.8 cm (70\")     General:  Patient is awake and alert.  Appears in no acute distress or discomfort.        Right lower extremity:  Skin is benign.  No palpable masses or adenopathy.  No focal area of tenderness.  Hip motion is limited and uncomfortable.  Positive Stinchfield maneuver.  Good strength with hip flexion, extension, abduction.  Good strength distally with plantarflexion and dorsiflexion of ankle, toes.  Sensation to light touch intact distally in the lower leg and foot.  Good pedal pulses with brisk cap refill.    Radiology: AP pelvis, AP and lateral views of the right hip are ordered by myself and reviewed to evaluate the patient's complaint.  The x-rays show severe hip osteoarthritis with joint space narrowing, subchondral sclerosis and osteophyte formation.  There are no " obvious acute abnormalities, lesions, masses, or other concerning findings.    Comparison films not available    Assessment:  Right hip osteoarthritis      Plan:    I had a lengthy discussion with the patient regarding options, both surgical and non-surgical.  Patient diagnosed with osteoarthritis of the hip.  Does have some degenerative back changes but has been ruled out by neurosurgery.  Has tried and failed multiple conservative treatments as noted above.  His symptoms are affecting his normal daily activities overall quality life.  Told the patient I feel like he does have some hip joint pathology and a lot of this may be explained by that.  He has tried conservative treatments and continues to be symptomatic.  I did discuss total joint replacement surgery with him as I feel he be a good candidate given symptoms and history.  I discussed surgery in detail including the use of a model as well as risk and benefits.   I did discuss risk and benefits with the patient with risk including but not limited to bleeding, infection, damage to nearby nerves, vessels, tendons, ligaments, continued pain, worsening pain, fracture, dislocation, leg length discrepancy, blood clots, even death with anesthesia and possible need for future procedures surgeries.  Patient understood this and has chosen to proceed.    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  He is also on Plavix which will have to be held 5 days prior to surgery.  We can plan to restart that post surgery with an aspirin daily for least 4 weeks.    They stated they do have an upcoming trips I told I would not recommend travel for 6 weeks after surgery minimum.    Patient will stay overnight for 23-hour observation.      Questions answered.  Patient understands and agrees        Ron  MD Alfonzo    08/13/2024    CC to Mg Clark MD

## 2024-10-18 ENCOUNTER — TRANSCRIBE ORDERS (OUTPATIENT)
Dept: DIABETES SERVICES | Facility: HOSPITAL | Age: 84
End: 2024-10-18
Payer: MEDICARE

## 2024-10-18 DIAGNOSIS — E11.9 TYPE 2 DIABETES MELLITUS WITHOUT COMPLICATION, UNSPECIFIED WHETHER LONG TERM INSULIN USE: Primary | ICD-10-CM

## 2024-10-24 ENCOUNTER — HOSPITAL ENCOUNTER (OUTPATIENT)
Dept: DIABETES SERVICES | Facility: HOSPITAL | Age: 84
Discharge: HOME | End: 2024-10-24
Admitting: INTERNAL MEDICINE
Payer: MEDICARE

## 2024-10-24 PROCEDURE — 97802 MEDICAL NUTRITION INDIV IN: CPT

## 2024-10-24 NOTE — CONSULTS
Antoine met with [facility] for Diabetes Education.  A comprehensive assessment/training record has been sent to medical records (see media tab) to associate with this encounter.     Spouse in attendance and supportive. ADA goals reviewed. Preventative exam handout provided. In PT- discussed benefits of activity. Consistent carbohydrate diet eating pattern discussed. Encouraged balancing carbs with lean proteins.     Antoine has been encouraged to call our office with questions or additional education needs. Please place referral for additional services or follow-up should need arise.     Thank you for the referral.

## 2024-11-24 DIAGNOSIS — M43.17 SPONDYLOLISTHESIS AT L5-S1 LEVEL: Primary | ICD-10-CM

## 2024-11-24 DIAGNOSIS — M25.551 PAIN OF RIGHT HIP: ICD-10-CM

## 2024-11-25 RX ORDER — DULOXETIN HYDROCHLORIDE 20 MG/1
20 CAPSULE, DELAYED RELEASE ORAL 2 TIMES DAILY
Qty: 180 CAPSULE | Refills: 3 | Status: SHIPPED | OUTPATIENT
Start: 2024-11-25

## 2024-12-30 ENCOUNTER — LAB (OUTPATIENT)
Dept: LAB | Facility: HOSPITAL | Age: 84
End: 2024-12-30
Payer: MEDICARE

## 2024-12-30 DIAGNOSIS — D47.2 SMOLDERING MYELOMA: ICD-10-CM

## 2024-12-30 DIAGNOSIS — N18.31 ANEMIA IN STAGE 3A CHRONIC KIDNEY DISEASE: ICD-10-CM

## 2024-12-30 DIAGNOSIS — D63.1 ANEMIA IN STAGE 3A CHRONIC KIDNEY DISEASE: ICD-10-CM

## 2024-12-30 LAB
ALBUMIN SERPL-MCNC: 3.8 G/DL (ref 3.5–5.2)
ALBUMIN/GLOB SERPL: 1.1 G/DL
ALP SERPL-CCNC: 63 U/L (ref 39–117)
ALT SERPL W P-5'-P-CCNC: 27 U/L (ref 1–41)
ANION GAP SERPL CALCULATED.3IONS-SCNC: 10.5 MMOL/L (ref 5–15)
AST SERPL-CCNC: 26 U/L (ref 1–40)
BASOPHILS # BLD AUTO: 0.01 10*3/MM3 (ref 0–0.2)
BASOPHILS NFR BLD AUTO: 0.3 % (ref 0–1.5)
BILIRUB SERPL-MCNC: 0.5 MG/DL (ref 0–1.2)
BUN SERPL-MCNC: 17 MG/DL (ref 8–23)
BUN/CREAT SERPL: 15.6 (ref 7–25)
CALCIUM SPEC-SCNC: 8.7 MG/DL (ref 8.6–10.5)
CHLORIDE SERPL-SCNC: 106 MMOL/L (ref 98–107)
CO2 SERPL-SCNC: 23.5 MMOL/L (ref 22–29)
CREAT SERPL-MCNC: 1.09 MG/DL (ref 0.76–1.27)
DEPRECATED RDW RBC AUTO: 42 FL (ref 37–54)
EGFRCR SERPLBLD CKD-EPI 2021: 67.3 ML/MIN/1.73
EOSINOPHIL # BLD AUTO: 0.04 10*3/MM3 (ref 0–0.4)
EOSINOPHIL NFR BLD AUTO: 1.1 % (ref 0.3–6.2)
ERYTHROCYTE [DISTWIDTH] IN BLOOD BY AUTOMATED COUNT: 11.9 % (ref 12.3–15.4)
GLOBULIN UR ELPH-MCNC: 3.5 GM/DL
GLUCOSE SERPL-MCNC: 123 MG/DL (ref 65–99)
HCT VFR BLD AUTO: 34.9 % (ref 37.5–51)
HGB BLD-MCNC: 12.1 G/DL (ref 13–17.7)
IMM GRANULOCYTES # BLD AUTO: 0.01 10*3/MM3 (ref 0–0.05)
IMM GRANULOCYTES NFR BLD AUTO: 0.3 % (ref 0–0.5)
LYMPHOCYTES # BLD AUTO: 1.97 10*3/MM3 (ref 0.7–3.1)
LYMPHOCYTES NFR BLD AUTO: 51.8 % (ref 19.6–45.3)
MCH RBC QN AUTO: 33.4 PG (ref 26.6–33)
MCHC RBC AUTO-ENTMCNC: 34.7 G/DL (ref 31.5–35.7)
MCV RBC AUTO: 96.4 FL (ref 79–97)
MONOCYTES # BLD AUTO: 0.35 10*3/MM3 (ref 0.1–0.9)
MONOCYTES NFR BLD AUTO: 9.2 % (ref 5–12)
NEUTROPHILS NFR BLD AUTO: 1.42 10*3/MM3 (ref 1.7–7)
NEUTROPHILS NFR BLD AUTO: 37.3 % (ref 42.7–76)
NRBC BLD AUTO-RTO: 0 /100 WBC (ref 0–0.2)
PLATELET # BLD AUTO: 172 10*3/MM3 (ref 140–450)
PMV BLD AUTO: 9.4 FL (ref 6–12)
POTASSIUM SERPL-SCNC: 3.8 MMOL/L (ref 3.5–5.2)
PROT SERPL-MCNC: 7.3 G/DL (ref 6–8.5)
RBC # BLD AUTO: 3.62 10*6/MM3 (ref 4.14–5.8)
SODIUM SERPL-SCNC: 140 MMOL/L (ref 136–145)
WBC NRBC COR # BLD AUTO: 3.8 10*3/MM3 (ref 3.4–10.8)

## 2024-12-30 PROCEDURE — 80053 COMPREHEN METABOLIC PANEL: CPT

## 2024-12-30 PROCEDURE — 85025 COMPLETE CBC W/AUTO DIFF WBC: CPT

## 2024-12-30 PROCEDURE — 36415 COLL VENOUS BLD VENIPUNCTURE: CPT

## 2025-01-02 LAB
ALBUMIN SERPL ELPH-MCNC: 3.7 G/DL (ref 2.9–4.4)
ALBUMIN/GLOB SERPL: 1.2 {RATIO} (ref 0.7–1.7)
ALPHA1 GLOB SERPL ELPH-MCNC: 0.2 G/DL (ref 0–0.4)
ALPHA2 GLOB SERPL ELPH-MCNC: 0.6 G/DL (ref 0.4–1)
B-GLOBULIN SERPL ELPH-MCNC: 0.8 G/DL (ref 0.7–1.3)
GAMMA GLOB SERPL ELPH-MCNC: 1.8 G/DL (ref 0.4–1.8)
GLOBULIN SER-MCNC: 3.3 G/DL (ref 2.2–3.9)
IGA SERPL-MCNC: 16 MG/DL (ref 61–437)
IGG SERPL-MCNC: 2307 MG/DL (ref 603–1613)
IGM SERPL-MCNC: 40 MG/DL (ref 15–143)
INTERPRETATION SERPL IEP-IMP: ABNORMAL
KAPPA LC FREE SER-MCNC: 50.2 MG/L (ref 3.3–19.4)
KAPPA LC FREE/LAMBDA FREE SER: 11.67 {RATIO} (ref 0.26–1.65)
LABORATORY COMMENT REPORT: ABNORMAL
LAMBDA LC FREE SERPL-MCNC: 4.3 MG/L (ref 5.7–26.3)
M PROTEIN SERPL ELPH-MCNC: 1.6 G/DL
PROT SERPL-MCNC: 7 G/DL (ref 6–8.5)

## 2025-01-07 NOTE — PROGRESS NOTES
Subjective     REASON FOR FOLLOW-UP:  Chronic leukopenia, smoldering myeloma, anemia                               REQUESTING PHYSICIAN:  Eduardo    RECORDS OBTAINED:  Records of the patients history including those obtained from the referring provider were reviewed and summarized in detail.      History of Present Illness   This is a very pleasant 84-year-old man returning today for follow-up of smoldering myeloma.  He is feeling well today with no significant fatigue, lightheadedness, bruising, bleeding, or weight loss.  He has been having low back pain and MRI showed degenerative changes and spinal stenosis, no evidence of malignancy.  He has been undergoing epidural injections but they did not help and now undergoing PT.    Past Medical History:   Diagnosis Date    Arthritis     hands    CAD (coronary artery disease)     COVID-19     Diabetes     GERD (gastroesophageal reflux disease)     H/O MGUS (monoclonal gammopathy of unknown significance)     History of colon polyps     History of herpes zoster 2007    History of pneumonia     Hyperlipidemia     Hypertension     Neutropenia     H/O hereditary neutropenia    Prostate cancer 2007    Stroke 1997        Past Surgical History:   Procedure Laterality Date    ANAL FISSURECTOMY      CATARACT EXTRACTION Bilateral 2009    COLONOSCOPY  06/2014    cecal polyp, sigmoid diverticulosis    ENDOSCOPY  2001    OTHER SURGICAL HISTORY  2007    Decortication and right lower lobe with resection    PROSTATE BIOPSY  2007    THORACOSCOPY          Current Outpatient Medications on File Prior to Visit   Medication Sig Dispense Refill    acetaminophen (TYLENOL) 500 MG tablet Take 1 tablet by mouth Every 6 (Six) Hours As Needed for Mild Pain . 100 tablet 0    albuterol sulfate  (90 Base) MCG/ACT inhaler Inhale 2 puffs Every 4 (Four) Hours As Needed for Wheezing. 1 g 0    amLODIPine (NORVASC) 2.5 MG tablet Take 1 tablet by mouth Daily.      atorvastatin (LIPITOR) 40 MG tablet  Daily.      clopidogrel (PLAVIX) 75 MG tablet Take 1 tablet by mouth.      clotrimazole (LOTRIMIN) 1 % cream       docusate sodium 100 MG capsule docusate sodium 100 mg capsule   Take 1 capsule every day by oral route.      DULoxetine (CYMBALTA) 20 MG capsule TAKE 1 CAPSULE BY MOUTH TWICE DAILY 180 capsule 3    esomeprazole (nexIUM) 40 MG capsule Take 1 capsule by mouth Every Morning Before Breakfast.      fluticasone (FLONASE) 50 MCG/ACT nasal spray       HYDROcodone-acetaminophen (NORCO) 5-325 MG per tablet Take 1 tablet by mouth Every 6 (Six) Hours As Needed.      ibuprofen (ADVIL,MOTRIN) 200 MG tablet Take 1 tablet by mouth Every 6 (Six) Hours As Needed for Mild Pain.      irbesartan-hydrochlorothiazide (AVALIDE) 300-12.5 MG tablet       loratadine (CLARITIN) 10 MG tablet Take 1 tablet by mouth Daily. 30 tablet 0    metFORMIN (GLUCOPHAGE) 1000 MG tablet Take 1 tablet by mouth Daily.      Multiple Vitamins-Minerals (MULTIVITAMIN ADULTS 50+ PO) Take  by mouth Daily.      pregabalin (LYRICA) 50 MG capsule Take 2 capsules by mouth Daily.      sildenafil (REVATIO) 20 MG tablet TK 2 TS PO QD PRN  3    zolpidem (AMBIEN) 5 MG tablet Take 1 tablet by mouth every night at bedtime.       No current facility-administered medications on file prior to visit.        ALLERGIES:    Allergies   Allergen Reactions    Fluoxetine Unknown - Low Severity    Gabapentin Unknown - Low Severity        Social History     Socioeconomic History    Marital status:      Spouse name: Candace    Years of education: High school   Tobacco Use    Smoking status: Former   Vaping Use    Vaping status: Never Used   Substance and Sexual Activity    Alcohol use: Yes     Comment: Occasionally    Drug use: No    Sexual activity: Defer        Family History   Problem Relation Age of Onset    Uterine cancer Mother     Hypertension Other     Hyperlipidemia Other     Arthritis Other     Stroke Other     Heart disease Sister     Hypertension Brother   "       Review of Systems   Constitutional:  Negative for activity change and fatigue.   HENT: Negative.     Eyes: Negative.    Respiratory: Negative.     Cardiovascular: Negative.    Gastrointestinal: Negative.    Genitourinary: Negative.    Musculoskeletal:  Positive for arthralgias, back pain and gait problem.   Skin: Negative.    Allergic/Immunologic: Negative.    Hematological: Negative.    Psychiatric/Behavioral: Negative.     ROS unchanged-1/8/25      Objective     Vitals:    01/09/25 1600   BP: 148/82   Pulse: 71   Resp: 17   Temp: 98.2 °F (36.8 °C)   TempSrc: Oral   SpO2: 95%   Weight: 90.4 kg (199 lb 3.2 oz)   Height: 177.8 cm (70\")   PainSc: 0-No pain             1/9/2025     4:00 PM   Current Status   ECOG score 1       Physical Exam    Con: pleasant, well-appearing man  HEENT: no icterus, no thrush, moist membranes  CV: RRR, S1S2  RESP: CTA bilat, no wheezing  GI: soft, nontender, no splenomegaly, +BS  MS: no edema, ambulates slight stooped posture  SKIN: no petechiae or bruising  NEURO: alert and oriented x3, normal strength, normal muscle tone  PSYCH: normal mood and affect  Exam is unchanged-1/8/25    RECENT LABS:  Hematology WBC   Date Value Ref Range Status   12/30/2024 3.80 3.40 - 10.80 10*3/mm3 Final     RBC   Date Value Ref Range Status   12/30/2024 3.62 (L) 4.14 - 5.80 10*6/mm3 Final     Hemoglobin   Date Value Ref Range Status   12/30/2024 12.1 (L) 13.0 - 17.7 g/dL Final     Hematocrit   Date Value Ref Range Status   12/30/2024 34.9 (L) 37.5 - 51.0 % Final     Platelets   Date Value Ref Range Status   12/30/2024 172 140 - 450 10*3/mm3 Final        Lab Results   Component Value Date    GLUCOSE 123 (H) 12/30/2024    BUN 17 12/30/2024    CREATININE 1.09 12/30/2024    EGFRIFAFRI 72 09/30/2021    BCR 15.6 12/30/2024    K 3.8 12/30/2024    CO2 23.5 12/30/2024    CALCIUM 8.7 12/30/2024    PROTENTOTREF 7.0 12/30/2024    ALBUMIN 3.8 12/30/2024    ALBUMIN 3.7 12/30/2024    LABIL2 1.2 12/30/2024    AST 26 " 12/30/2024    ALT 27 12/30/2024             Assessment & Plan     * Chronic leukopenia:   ANC has run borderline low for a number of years.  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    Bone marrow exam was performed 1/11/19.  The bone marrow was mildly hypercellular 40% with involvement of a plasma cell dyscrasia 8% by aspirate and 15% by core biopsy.  Plasma cells were monoclonal kappa by flow cytometry.  There was trilineage hematopoiesis.  [place] red stain was negative for amyloid deposition.  Cytogenetics were normal; complete FISH panel could not be performed.  No dyspoiesis of the white cells noted.  ANC stable 1.42    *Smoldering myeloma:    increased number of plasma cells by bone marrow biopsy 8% aspirate/15% core biopsy monoclonal kappa.  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    CBC and CMP stable today with no endorgan damage; M spike 1.6 g/dL, light chain ratio 11.6     *Mild normocytic anemia:    the patient has stage II-III 3A chronic kidney disease as the likely cause.  Iron studies, B12, folic acid without obvious deficiency.    Hemoglobin currently stable 12.1    *Lumbar back pain  MRI lumbar spine 1/4/2024 reviewed-multilevel degenerative disease of the lumbar spine, moderate to severe L4-L5 and severe L5-S1 foraminal stenosis, no evidence of malignancy  He received epidural injections without much help now undergoing PT      Hematology plan/recommendations:  Continue observation-6-month follow-up with repeat CBC CMP SPEP/LILLY and free light chain ratio requested  PET scan to make sure no myelomatous involvement of the bones to cause the patient's worsening back pain/sciatica etc.

## 2025-01-09 ENCOUNTER — OFFICE VISIT (OUTPATIENT)
Dept: ONCOLOGY | Facility: CLINIC | Age: 85
End: 2025-01-09
Payer: MEDICARE

## 2025-01-09 VITALS
OXYGEN SATURATION: 95 % | TEMPERATURE: 98.2 F | RESPIRATION RATE: 17 BRPM | HEIGHT: 70 IN | WEIGHT: 199.2 LBS | SYSTOLIC BLOOD PRESSURE: 148 MMHG | BODY MASS INDEX: 28.52 KG/M2 | DIASTOLIC BLOOD PRESSURE: 82 MMHG | HEART RATE: 71 BPM

## 2025-01-09 DIAGNOSIS — C90.00 MULTIPLE MYELOMA, REMISSION STATUS UNSPECIFIED: ICD-10-CM

## 2025-01-09 DIAGNOSIS — D47.2 SMOLDERING MYELOMA: Primary | ICD-10-CM

## 2025-01-16 ENCOUNTER — HOSPITAL ENCOUNTER (OUTPATIENT)
Dept: PET IMAGING | Facility: HOSPITAL | Age: 85
Discharge: HOME | End: 2025-01-16
Payer: MEDICARE

## 2025-01-16 DIAGNOSIS — C90.00 MULTIPLE MYELOMA, REMISSION STATUS UNSPECIFIED: ICD-10-CM

## 2025-01-16 DIAGNOSIS — D47.2 SMOLDERING MYELOMA: ICD-10-CM

## 2025-01-16 LAB — GLUCOSE BLDC GLUCOMTR-MCNC: 89 MG/DL (ref 70–130)

## 2025-01-16 PROCEDURE — A9552 F18 FDG: HCPCS | Performed by: INTERNAL MEDICINE

## 2025-01-16 PROCEDURE — 82948 REAGENT STRIP/BLOOD GLUCOSE: CPT

## 2025-01-16 PROCEDURE — 34310000005 FLUDEOXYGLUCOSE F18 SOLUTION: Performed by: INTERNAL MEDICINE

## 2025-01-16 PROCEDURE — 78816 PET IMAGE W/CT FULL BODY: CPT

## 2025-01-16 RX ADMIN — FLUDEOXYGLUCOSE F 18 1 DOSE: 200 INJECTION, SOLUTION INTRAVENOUS at 13:27

## 2025-01-21 ENCOUNTER — TELEPHONE (OUTPATIENT)
Dept: ONCOLOGY | Facility: CLINIC | Age: 85
End: 2025-01-21
Payer: MEDICARE

## 2025-01-21 NOTE — TELEPHONE ENCOUNTER
----- Message from Josh Daigle sent at 1/21/2025  7:11 AM EST -----  PIP his PET was negative for myeloma in bones-joints showed arthritic/degenerative changes

## 2025-02-11 ENCOUNTER — TELEPHONE (OUTPATIENT)
Dept: NEUROSURGERY | Facility: CLINIC | Age: 85
End: 2025-02-11
Payer: MEDICARE

## 2025-02-11 NOTE — TELEPHONE ENCOUNTER
Caller: SERGIO @ [facility]     Relationship:     Best call back number: [phone]    What was the call regarding: SERGIO @ [facility] CALLED STATING THAT THE PAPERWORK THEY GOT BACK FROM [place] IS PARTICALLY INCORRECT - THE 2ND PAGE THAT THE DR WOULD SIGN IS ACTUALLY PAPERWORK FOR A DIFFERENT PATIENT - AND ARE STILL MISSING THE 2ND PAGE     PLEASE REFAX [phone]   THANK YOU

## 2025-04-21 ENCOUNTER — APPOINTMENT (OUTPATIENT)
Dept: CT IMAGING | Facility: HOSPITAL | Age: 85
End: 2025-04-21
Payer: MEDICARE

## 2025-04-21 ENCOUNTER — HOSPITAL ENCOUNTER (EMERGENCY)
Facility: HOSPITAL | Age: 85
Discharge: HOME | End: 2025-04-21
Attending: EMERGENCY MEDICINE | Admitting: EMERGENCY MEDICINE
Payer: MEDICARE

## 2025-04-21 ENCOUNTER — APPOINTMENT (OUTPATIENT)
Dept: GENERAL RADIOLOGY | Facility: HOSPITAL | Age: 85
End: 2025-04-21
Payer: MEDICARE

## 2025-04-21 VITALS
SYSTOLIC BLOOD PRESSURE: 138 MMHG | DIASTOLIC BLOOD PRESSURE: 78 MMHG | HEART RATE: 78 BPM | RESPIRATION RATE: 16 BRPM | OXYGEN SATURATION: 96 % | TEMPERATURE: 97.5 F

## 2025-04-21 DIAGNOSIS — M16.11 ARTHRITIS OF RIGHT HIP: Primary | ICD-10-CM

## 2025-04-21 DIAGNOSIS — M25.551 RIGHT HIP PAIN: ICD-10-CM

## 2025-04-21 LAB
ALBUMIN SERPL-MCNC: 3.7 G/DL (ref 3.5–5.2)
ALBUMIN/GLOB SERPL: 1.1 G/DL
ALP SERPL-CCNC: 63 U/L (ref 39–117)
ALT SERPL W P-5'-P-CCNC: 27 U/L (ref 1–41)
ANION GAP SERPL CALCULATED.3IONS-SCNC: 11.1 MMOL/L (ref 5–15)
AST SERPL-CCNC: 26 U/L (ref 1–40)
BASOPHILS # BLD AUTO: 0 10*3/MM3 (ref 0–0.2)
BASOPHILS NFR BLD AUTO: 0 % (ref 0–1.5)
BILIRUB SERPL-MCNC: 0.6 MG/DL (ref 0–1.2)
BUN SERPL-MCNC: 17 MG/DL (ref 8–23)
BUN/CREAT SERPL: 14.2 (ref 7–25)
CALCIUM SPEC-SCNC: 9 MG/DL (ref 8.6–10.5)
CHLORIDE SERPL-SCNC: 101 MMOL/L (ref 98–107)
CO2 SERPL-SCNC: 24.9 MMOL/L (ref 22–29)
CREAT SERPL-MCNC: 1.2 MG/DL (ref 0.76–1.27)
DEPRECATED RDW RBC AUTO: 50.4 FL (ref 37–54)
EGFRCR SERPLBLD CKD-EPI 2021: 59.6 ML/MIN/1.73
EOSINOPHIL # BLD AUTO: 0.01 10*3/MM3 (ref 0–0.4)
EOSINOPHIL NFR BLD AUTO: 0.3 % (ref 0.3–6.2)
ERYTHROCYTE [DISTWIDTH] IN BLOOD BY AUTOMATED COUNT: 14 % (ref 12.3–15.4)
GLOBULIN UR ELPH-MCNC: 3.5 GM/DL
GLUCOSE SERPL-MCNC: 151 MG/DL (ref 65–99)
HCT VFR BLD AUTO: 33.5 % (ref 37.5–51)
HGB BLD-MCNC: 11.7 G/DL (ref 13–17.7)
IMM GRANULOCYTES # BLD AUTO: 0.02 10*3/MM3 (ref 0–0.05)
IMM GRANULOCYTES NFR BLD AUTO: 0.5 % (ref 0–0.5)
LYMPHOCYTES # BLD AUTO: 1.25 10*3/MM3 (ref 0.7–3.1)
LYMPHOCYTES NFR BLD AUTO: 33.5 % (ref 19.6–45.3)
MCH RBC QN AUTO: 34.6 PG (ref 26.6–33)
MCHC RBC AUTO-ENTMCNC: 34.9 G/DL (ref 31.5–35.7)
MCV RBC AUTO: 99.1 FL (ref 79–97)
MONOCYTES # BLD AUTO: 0.37 10*3/MM3 (ref 0.1–0.9)
MONOCYTES NFR BLD AUTO: 9.9 % (ref 5–12)
NEUTROPHILS NFR BLD AUTO: 2.08 10*3/MM3 (ref 1.7–7)
NEUTROPHILS NFR BLD AUTO: 55.8 % (ref 42.7–76)
NRBC BLD AUTO-RTO: 0 /100 WBC (ref 0–0.2)
PLATELET # BLD AUTO: 184 10*3/MM3 (ref 140–450)
PMV BLD AUTO: 9.7 FL (ref 6–12)
POTASSIUM SERPL-SCNC: 4 MMOL/L (ref 3.5–5.2)
PROT SERPL-MCNC: 7.2 G/DL (ref 6–8.5)
RBC # BLD AUTO: 3.38 10*6/MM3 (ref 4.14–5.8)
SODIUM SERPL-SCNC: 137 MMOL/L (ref 136–145)
WBC NRBC COR # BLD AUTO: 3.73 10*3/MM3 (ref 3.4–10.8)

## 2025-04-21 PROCEDURE — 25010000002 ONDANSETRON PER 1 MG: Performed by: EMERGENCY MEDICINE

## 2025-04-21 PROCEDURE — 96374 THER/PROPH/DIAG INJ IV PUSH: CPT

## 2025-04-21 PROCEDURE — 96376 TX/PRO/DX INJ SAME DRUG ADON: CPT

## 2025-04-21 PROCEDURE — 25010000002 MORPHINE PER 10 MG: Performed by: EMERGENCY MEDICINE

## 2025-04-21 PROCEDURE — 96375 TX/PRO/DX INJ NEW DRUG ADDON: CPT

## 2025-04-21 PROCEDURE — 99284 EMERGENCY DEPT VISIT MOD MDM: CPT

## 2025-04-21 PROCEDURE — 85025 COMPLETE CBC W/AUTO DIFF WBC: CPT | Performed by: EMERGENCY MEDICINE

## 2025-04-21 PROCEDURE — 72192 CT PELVIS W/O DYE: CPT

## 2025-04-21 PROCEDURE — 80053 COMPREHEN METABOLIC PANEL: CPT | Performed by: EMERGENCY MEDICINE

## 2025-04-21 RX ORDER — MORPHINE SULFATE 2 MG/ML
4 INJECTION, SOLUTION INTRAMUSCULAR; INTRAVENOUS ONCE
Status: COMPLETED | OUTPATIENT
Start: 2025-04-21 | End: 2025-04-21

## 2025-04-21 RX ORDER — ONDANSETRON 2 MG/ML
4 INJECTION INTRAMUSCULAR; INTRAVENOUS ONCE
Status: COMPLETED | OUTPATIENT
Start: 2025-04-21 | End: 2025-04-21

## 2025-04-21 RX ADMIN — MORPHINE SULFATE 4 MG: 2 INJECTION, SOLUTION INTRAMUSCULAR; INTRAVENOUS at 11:10

## 2025-04-21 RX ADMIN — MORPHINE SULFATE 4 MG: 2 INJECTION, SOLUTION INTRAMUSCULAR; INTRAVENOUS at 13:00

## 2025-04-21 RX ADMIN — ONDANSETRON 4 MG: 2 INJECTION, SOLUTION INTRAMUSCULAR; INTRAVENOUS at 11:10

## 2025-04-21 NOTE — ED PROVIDER NOTES
EMERGENCY DEPARTMENT ENCOUNTER    Room Number:  S03/03  Date of encounter:  4/21/2025  PCP: Mg Clark MD  Historian: Patient, spouse  Chronic or social conditions impacting care (social determinants of health): None    HPI:  Chief Complaint: Right hip pain  A complete HPI/ROS/PMH/PSH/SH/FH are unobtainable due to: Nothing    Context: Antoine Story is a 85 y.o. male with a history of MGUS, chronic kidney disease, osteoarthritis, diabetes who presents to the ED c/o acute on chronic right hip pain for the past several days.  Patient does have a history of chronic bilateral knee and hip pain.  He does follow-up with 2 different orthopedists.  He has had worsening right hip pain for the past several days without any known trauma.  The pain is worse with palpation and attempts of ambulating.  Denies any fevers or chills.  He did have a steroid injection in his right hip on 3/3/2025 by Dr. Khoury.  He states that this did help temporarily however his pain has returned worse than ever at this point.    Review of prior external notes (non-ED):   I reviewed an orthopedic office visit dated 3/28/2025.  Patient being followed for chronic knee pain chronic hip pain.    Review of prior external test results outside of this encounter:  I reviewed a hip x-ray dated 2/7/2025.  This showed severe arthritis of the right hip.    PAST MEDICAL HISTORY  Active Ambulatory Problems     Diagnosis Date Noted    Smoldering myeloma 03/12/2018    Other neutropenia 03/12/2018    Anemia in stage 3 chronic kidney disease 04/16/2018    Mild renal insufficiency 06/26/2020    Bronchitis 05/14/2023    Spondylolisthesis at L5-S1 level 07/30/2024    Pain of right hip 07/30/2024    Osteoarthritis of right hip 08/13/2024     Resolved Ambulatory Problems     Diagnosis Date Noted    No Resolved Ambulatory Problems     Past Medical History:   Diagnosis Date    Arthritis     CAD (coronary artery disease)     COVID-19     Diabetes     GERD  (gastroesophageal reflux disease)     H/O MGUS (monoclonal gammopathy of unknown significance)     History of colon polyps     History of herpes zoster 2007    History of pneumonia     Hyperlipidemia     Hypertension     Neutropenia     Prostate cancer 2007    Stroke 1997         PAST SURGICAL HISTORY  Past Surgical History:   Procedure Laterality Date    ANAL FISSURECTOMY      CATARACT EXTRACTION Bilateral 2009    COLONOSCOPY  06/2014    cecal polyp, sigmoid diverticulosis    ENDOSCOPY  2001    OTHER SURGICAL HISTORY  2007    Decortication and right lower lobe with resection    PROSTATE BIOPSY  2007    THORACOSCOPY           FAMILY HISTORY  Family History   Problem Relation Age of Onset    Uterine cancer Mother     Hypertension Other     Hyperlipidemia Other     Arthritis Other     Stroke Other     Heart disease Sister     Hypertension Brother          SOCIAL HISTORY  Social History     Socioeconomic History    Marital status:      Spouse name: Candace    Years of education: High school   Tobacco Use    Smoking status: Former   Vaping Use    Vaping status: Never Used   Substance and Sexual Activity    Alcohol use: Yes     Comment: Occasionally    Drug use: No    Sexual activity: Defer         ALLERGIES  Fluoxetine and Gabapentin        REVIEW OF SYSTEMS  All systems reviewed and negative except for those discussed in HPI.       PHYSICAL EXAM    I have reviewed the triage vital signs and nursing notes.    ED Triage Vitals   Temp Heart Rate Resp BP SpO2   04/21/25 0920 04/21/25 0920 04/21/25 0920 04/21/25 0940 04/21/25 0920   97.5 °F (36.4 °C) 97 18 145/74 97 %      Temp src Heart Rate Source Patient Position BP Location FiO2 (%)   04/21/25 0920 -- -- 04/21/25 0940 --   Tympanic   Right arm        Physical Exam  GENERAL: Alert, oriented, not distressed  HENT: head atraumatic, no nuchal rigidity  EYES: no scleral icterus, EOMI  CV: regular rhythm, regular rate, no murmur  RESPIRATORY: normal effort,  CTA  ABDOMEN: soft, nontender  MUSCULOSKELETAL: Mild tenderness over the right lateral hip.  No significant swelling, erythema.  Mildly reduced range of motion.  Neurovascularly intact distally.  NEURO: alert, moves all extremities, follows commands  SKIN: warm, dry        LAB RESULTS  Recent Results (from the past 24 hours)   Comprehensive Metabolic Panel    Collection Time: 04/21/25 11:05 AM    Specimen: Arm, Left; Blood   Result Value Ref Range    Glucose 151 (H) 65 - 99 mg/dL    BUN 17 8 - 23 mg/dL    Creatinine 1.20 0.76 - 1.27 mg/dL    Sodium 137 136 - 145 mmol/L    Potassium 4.0 3.5 - 5.2 mmol/L    Chloride 101 98 - 107 mmol/L    CO2 24.9 22.0 - 29.0 mmol/L    Calcium 9.0 8.6 - 10.5 mg/dL    Total Protein 7.2 6.0 - 8.5 g/dL    Albumin 3.7 3.5 - 5.2 g/dL    ALT (SGPT) 27 1 - 41 U/L    AST (SGOT) 26 1 - 40 U/L    Alkaline Phosphatase 63 39 - 117 U/L    Total Bilirubin 0.6 0.0 - 1.2 mg/dL    Globulin 3.5 gm/dL    A/G Ratio 1.1 g/dL    BUN/Creatinine Ratio 14.2 7.0 - 25.0    Anion Gap 11.1 5.0 - 15.0 mmol/L    eGFR 59.6 (L) >60.0 mL/min/1.73   CBC Auto Differential    Collection Time: 04/21/25 11:05 AM    Specimen: Arm, Left; Blood   Result Value Ref Range    WBC 3.73 3.40 - 10.80 10*3/mm3    RBC 3.38 (L) 4.14 - 5.80 10*6/mm3    Hemoglobin 11.7 (L) 13.0 - 17.7 g/dL    Hematocrit 33.5 (L) 37.5 - 51.0 %    MCV 99.1 (H) 79.0 - 97.0 fL    MCH 34.6 (H) 26.6 - 33.0 pg    MCHC 34.9 31.5 - 35.7 g/dL    RDW 14.0 12.3 - 15.4 %    RDW-SD 50.4 37.0 - 54.0 fl    MPV 9.7 6.0 - 12.0 fL    Platelets 184 140 - 450 10*3/mm3    Neutrophil % 55.8 42.7 - 76.0 %    Lymphocyte % 33.5 19.6 - 45.3 %    Monocyte % 9.9 5.0 - 12.0 %    Eosinophil % 0.3 0.3 - 6.2 %    Basophil % 0.0 0.0 - 1.5 %    Immature Grans % 0.5 0.0 - 0.5 %    Neutrophils, Absolute 2.08 1.70 - 7.00 10*3/mm3    Lymphocytes, Absolute 1.25 0.70 - 3.10 10*3/mm3    Monocytes, Absolute 0.37 0.10 - 0.90 10*3/mm3    Eosinophils, Absolute 0.01 0.00 - 0.40 10*3/mm3    Basophils,  Absolute 0.00 0.00 - 0.20 10*3/mm3    Immature Grans, Absolute 0.02 0.00 - 0.05 10*3/mm3    nRBC 0.0 0.0 - 0.2 /100 WBC       Ordered the above labs and independently reviewed the results.        RADIOLOGY  CT Pelvis Without Contrast  Result Date: 4/21/2025  CT PELVIS WITHOUT CONTRAST  HISTORY: Acute right hip pain for the past several days.  TECHNIQUE: Pelvic CT includes axial imaging through the pelvis without contrast and data reconstructed in coronal and sagittal planes. Radiation does reduction techniques were utilized, including automated exposure control and exposure modulation based on body size.  COMPARISON: PET/CT 01/16/2025, x-rays of the hips 08/05/2024.  FINDINGS: There is severe right hip osteoarthritis with superolateral migration of the right femoral head. Joint space narrowing is present and there is subchondral sclerosis and spur formation. Small subchondral cysts are present in the superior right femoral head. Mild osteoarthritis of the left hip with marginal spur formation. No evidence for fracture or acute abnormality.  There is advanced degenerative disc disease within the mid and lower lumbar spine with disc space narrowing and vacuum phenomena. Marginal spur formation.  Chronic bilateral L5 pars interarticularis defects with 8 mm spondylolisthesis of L5 with respect to S1. There is diminished foraminal height bilaterally at L5-S1 with severe bilateral foraminal narrowing.  There is colonic diverticulosis without evidence for diverticulitis. Diffuse atherosclerotic calcifications are present involving the abdominal aorta and iliac vasculature. Within the proximal anterior thigh centered between the sartorius and rectus femoris muscles there is a lipoma that is partially imaged and extends off the field-of-view distally measuring approximately 5 x 4.2 cm.      1. Advanced right hip osteoarthritis. 2. Mild left hip osteoarthritis. 3. Advanced degenerative disc disease in the lower lumbar spine.  Chronic bilateral L5 pars interarticularis defects with 8 mm spondylolisthesis of L5 with respect to S1. Diminished femoral height with severe foraminal stenosis at L5-S1. 4. Lipoma within the proximal anterior thigh musculature is incompletely imaged and extends off the field-of-view distally. Lipoma is centered between the sartorius and rectus femoris muscles measuring 5 x 4.2 cm axial dimension.  This report was finalized on 4/21/2025 1:09 PM by Silverio Conner M.D on Workstation: [id number]        I ordered the above noted radiological studies. Reviewed by me and discussed with radiologist.  See dictation for official radiology interpretation.      MEDICATIONS GIVEN IN ER    Medications   morphine injection 4 mg (4 mg Intravenous Given 4/21/25 1110)   ondansetron (ZOFRAN) injection 4 mg (4 mg Intravenous Given 4/21/25 1110)   morphine injection 4 mg (4 mg Intravenous Given 4/21/25 1300)         ADDITIONAL ORDERS CONSIDERED BUT NOT ORDERED:  Admission was considered but after careful review of the patient's presentation, physical examination, diagnostic results, and response to treatment the patient may be safely discharged with outpatient follow-up.       PROGRESS, DATA ANALYSIS, CONSULTS, AND MEDICAL DECISION MAKING    All labs have been independently interpreted by myself.  All radiology studies have been independently interpreted by myself and discussed with radiologist dictating the report.   EKGs independently interpreted by myself.  Discussion below represents my analysis of pertinent findings related to patient's condition, differential diagnosis, treatment plan and final disposition.    I have discussed case with Dr. Dumont, emergency room physician.  She has performed her own bedside examination and agrees with treatment plan.    ED Course as of 04/21/25 1943 Mon Apr 21, 2025   1030 Patient presents with several day history of atraumatic right hip pain.  The patient denies any fevers, chills.  He  is having significant difficulty ambulating.  Plan for CT imaging, pain control. [EE]   1141 WBC: 3.73 [EE]   1141 Hemoglobin(!): 11.7 [EE]   1300 CT imaging of the pelvis in the billing interpreted myself shows severe arthritis without evidence of soft tissue swelling, fluid. [EE]   1331 Patient updated on workup.  I suspect all of his pain is due to severe degenerative arthritis.  He is already invested with an orthopedist.  He has pain medicine at home which I explained that he could double up on.  He is able to ambulate with a walker.  We will discharge. [EE]      ED Course User Index  [EE] Rey Bailey PA       AS OF 19:43 EDT VITALS:    BP - 138/78  HR - 78  TEMP - 97.5 °F (36.4 °C) (Tympanic)  O2 SATS - 96%        DIAGNOSIS  Final diagnoses:   Arthritis of right hip   Right hip pain         DISPOSITION  Discharged    Admission was considered but after careful review of the patient's presentation, physical examination, diagnostic results, and response to treatment the patient may be safely discharged with outpatient follow-up.         Dictated utilizing Dragon dictation     Rey Bailey PA  04/21/25 1943

## 2025-04-21 NOTE — ED TRIAGE NOTES
Pt to ED from home due to R hip pain. Pt is in PT for R hip pain. Pt stated he had a steroid shot but thinks it is wear off.     PT was in therapy last week stated he did an exeresis that he normally does not do and since then increase in pain. Pt is able to ambulate but with increase pain and now a use for a cane.

## 2025-04-21 NOTE — ED PROVIDER NOTES
MD ATTESTATION NOTE    SHARED VISIT: This visit was performed by BOTH a physician and an APC. The substantive portion of the medical decision making was performed by this attesting physician who made or approved the management plan and takes responsibility for patient management. All studies in the APC note (if performed) were independently interpreted by me.     The GERSON and I have discussed this patient's history, physical exam, and treatment plan.  I have reviewed the documentation and affirm the documentation and agree with the treatment and plan.  The attached note describes my personal findings.      Independent Historians: Patient    A complete HPI/ROS/PMH/PSH/SH/FH are unobtainable due to: None    Chronic or social conditions impacting patient care (social determinants of health): None    Antoine Story is a 85 y.o. male who presents to the ED c/o acute on chronic right hip pain exacerbated for the past several days.  Patient with chronic bilateral hip and knee pain.  Patient denies any falls, fevers.  Patient did have a steroid injection in his right hip in March by his orthopedist.  He had some pain relief initially but pain has returned over the last couple of weeks and is worse.          On exam:  GENERAL: Pleasant cooperative and conversant male of advanced age, alert, no acute distress  SKIN: Warm, dry  HENT: Normocephalic, atraumatic  RESPIRATORY: Relaxed breathing  ABDOMEN: soft, nontender, nondistended  MUSCULOSKELETAL: no deformity, no calf tenderness to palpation, 2+ PT pulse right lower extremity  NEURO: alert, moves all extremities, follows commands                                                             Labs  Recent Results (from the past 24 hours)   Comprehensive Metabolic Panel    Collection Time: 04/21/25 11:05 AM    Specimen: Arm, Left; Blood   Result Value Ref Range    Glucose 151 (H) 65 - 99 mg/dL    BUN 17 8 - 23 mg/dL    Creatinine 1.20 0.76 - 1.27 mg/dL    Sodium 137 136 - 145  mmol/L    Potassium 4.0 3.5 - 5.2 mmol/L    Chloride 101 98 - 107 mmol/L    CO2 24.9 22.0 - 29.0 mmol/L    Calcium 9.0 8.6 - 10.5 mg/dL    Total Protein 7.2 6.0 - 8.5 g/dL    Albumin 3.7 3.5 - 5.2 g/dL    ALT (SGPT) 27 1 - 41 U/L    AST (SGOT) 26 1 - 40 U/L    Alkaline Phosphatase 63 39 - 117 U/L    Total Bilirubin 0.6 0.0 - 1.2 mg/dL    Globulin 3.5 gm/dL    A/G Ratio 1.1 g/dL    BUN/Creatinine Ratio 14.2 7.0 - 25.0    Anion Gap 11.1 5.0 - 15.0 mmol/L    eGFR 59.6 (L) >60.0 mL/min/1.73   CBC Auto Differential    Collection Time: 04/21/25 11:05 AM    Specimen: Arm, Left; Blood   Result Value Ref Range    WBC 3.73 3.40 - 10.80 10*3/mm3    RBC 3.38 (L) 4.14 - 5.80 10*6/mm3    Hemoglobin 11.7 (L) 13.0 - 17.7 g/dL    Hematocrit 33.5 (L) 37.5 - 51.0 %    MCV 99.1 (H) 79.0 - 97.0 fL    MCH 34.6 (H) 26.6 - 33.0 pg    MCHC 34.9 31.5 - 35.7 g/dL    RDW 14.0 12.3 - 15.4 %    RDW-SD 50.4 37.0 - 54.0 fl    MPV 9.7 6.0 - 12.0 fL    Platelets 184 140 - 450 10*3/mm3    Neutrophil % 55.8 42.7 - 76.0 %    Lymphocyte % 33.5 19.6 - 45.3 %    Monocyte % 9.9 5.0 - 12.0 %    Eosinophil % 0.3 0.3 - 6.2 %    Basophil % 0.0 0.0 - 1.5 %    Immature Grans % 0.5 0.0 - 0.5 %    Neutrophils, Absolute 2.08 1.70 - 7.00 10*3/mm3    Lymphocytes, Absolute 1.25 0.70 - 3.10 10*3/mm3    Monocytes, Absolute 0.37 0.10 - 0.90 10*3/mm3    Eosinophils, Absolute 0.01 0.00 - 0.40 10*3/mm3    Basophils, Absolute 0.00 0.00 - 0.20 10*3/mm3    Immature Grans, Absolute 0.02 0.00 - 0.05 10*3/mm3    nRBC 0.0 0.0 - 0.2 /100 WBC       Radiology  CT Pelvis Without Contrast  Result Date: 4/21/2025  CT PELVIS WITHOUT CONTRAST  HISTORY: Acute right hip pain for the past several days.  TECHNIQUE: Pelvic CT includes axial imaging through the pelvis without contrast and data reconstructed in coronal and sagittal planes. Radiation does reduction techniques were utilized, including automated exposure control and exposure modulation based on body size.  COMPARISON: PET/CT  01/16/2025, x-rays of the hips 08/05/2024.  FINDINGS: There is severe right hip osteoarthritis with superolateral migration of the right femoral head. Joint space narrowing is present and there is subchondral sclerosis and spur formation. Small subchondral cysts are present in the superior right femoral head. Mild osteoarthritis of the left hip with marginal spur formation. No evidence for fracture or acute abnormality.  There is advanced degenerative disc disease within the mid and lower lumbar spine with disc space narrowing and vacuum phenomena. Marginal spur formation.  Chronic bilateral L5 pars interarticularis defects with 8 mm spondylolisthesis of L5 with respect to S1. There is diminished foraminal height bilaterally at L5-S1 with severe bilateral foraminal narrowing.  There is colonic diverticulosis without evidence for diverticulitis. Diffuse atherosclerotic calcifications are present involving the abdominal aorta and iliac vasculature. Within the proximal anterior thigh centered between the sartorius and rectus femoris muscles there is a lipoma that is partially imaged and extends off the field-of-view distally measuring approximately 5 x 4.2 cm.      1. Advanced right hip osteoarthritis. 2. Mild left hip osteoarthritis. 3. Advanced degenerative disc disease in the lower lumbar spine. Chronic bilateral L5 pars interarticularis defects with 8 mm spondylolisthesis of L5 with respect to S1. Diminished femoral height with severe foraminal stenosis at L5-S1. 4. Lipoma within the proximal anterior thigh musculature is incompletely imaged and extends off the field-of-view distally. Lipoma is centered between the sartorius and rectus femoris muscles measuring 5 x 4.2 cm axial dimension.  This report was finalized on 4/21/2025 1:09 PM by Silverio Conner M.D on Workstation: [id number]        Medical Decision Making:  ED Course as of 04/22/25 1253   Mon Apr 21, 2025   1030 Patient presents with several day  history of atraumatic right hip pain.  The patient denies any fevers, chills.  He is having significant difficulty ambulating.  Plan for CT imaging, pain control. [EE]   1141 WBC: 3.73 [EE]   1141 Hemoglobin(!): 11.7 [EE]   1300 CT imaging of the pelvis in the billing interpreted myself shows severe arthritis without evidence of soft tissue swelling, fluid. [EE]   1331 Patient updated on workup.  I suspect all of his pain is due to severe degenerative arthritis.  He is already invested with an orthopedist.  He has pain medicine at home which I explained that he could double up on.  He is able to ambulate with a walker.  We will discharge. [EE]      ED Course User Index  [EE] Rey Bailey PA       MDM: Acute on chronic right hip pain with a history of OA and degenerative joint disease.  Patient already followed by Ortho as an outpatient.  Possibilities include fracture, malalignment, septic hip, DVT, pelvic fracture, hip strain or muscle strain, sciatica, among other possibilities.    Procedures:  Procedures        PPE: I followed hospital protocols for proper PPE based on patient presentation including use of N95 mask for suspected infectious respiratory conditions.  Proper hand hygiene was performed both before and after the patient encounter.          Diagnosis  Final diagnoses:   Arthritis of right hip   Right hip pain       Note Disclaimer: At [facility], we believe that sharing information builds trust and better relationships. You are receiving this note because you recently visited [facility]. It is possible you will see health information before a provider has talked with you about it. This kind of information can be easy to misunderstand. To help you fully understand what it means for your health, we urge you to discuss this note with your provider.       Sindi Dumont MD  04/22/25 [id number]

## 2025-04-24 ENCOUNTER — TELEPHONE (OUTPATIENT)
Dept: ONCOLOGY | Facility: CLINIC | Age: 85
End: 2025-04-24
Payer: MEDICARE

## 2025-04-24 NOTE — TELEPHONE ENCOUNTER
[facility] received a fax that requires your attention. The document has been indexed to the patient’s chart for your review.    Reason for sending: SURGERY CLEARANCE REQUEST    Documents Description: SURGERY CLEARANCE REQUEST_[facility]_04/23/25  FACESHEET_[facility]_04/23/25    Name of Sender: SIERRA SIERRA    Date Indexed: 04/24/25    Notes (if needed):

## 2025-06-06 ENCOUNTER — TELEPHONE (OUTPATIENT)
Dept: ONCOLOGY | Facility: CLINIC | Age: 85
End: 2025-06-06
Payer: MEDICARE

## 2025-06-26 ENCOUNTER — TELEPHONE (OUTPATIENT)
Dept: ONCOLOGY | Facility: CLINIC | Age: 85
End: 2025-06-26
Payer: MEDICARE

## 2025-06-26 NOTE — TELEPHONE ENCOUNTER
Caller: Candace Story  (ON  [facility])    Relationship to patient: Emergency Contact    Best call back number: [phone]    Chief complaint: PATIENT NEEDS TO RESCHEDULE     Type of visit: VITALS AND FOLLOW UP     Requested date: MORNING, EXCEPT 7-10-25 WOULD HAVE TO BE AFTER 10 AM      If rescheduling, when is the original appointment: 7-7-25

## 2025-06-30 ENCOUNTER — LAB (OUTPATIENT)
Dept: LAB | Facility: HOSPITAL | Age: 85
End: 2025-06-30
Payer: MEDICARE

## 2025-06-30 DIAGNOSIS — D47.2 SMOLDERING MYELOMA: ICD-10-CM

## 2025-06-30 DIAGNOSIS — C90.00 MULTIPLE MYELOMA, REMISSION STATUS UNSPECIFIED: ICD-10-CM

## 2025-06-30 LAB
ALBUMIN SERPL-MCNC: 4.1 G/DL (ref 3.5–5.2)
ALBUMIN/GLOB SERPL: 1.3 G/DL
ALP SERPL-CCNC: 86 U/L (ref 39–117)
ALT SERPL W P-5'-P-CCNC: 29 U/L (ref 1–41)
ANION GAP SERPL CALCULATED.3IONS-SCNC: 12.2 MMOL/L (ref 5–15)
AST SERPL-CCNC: 22 U/L (ref 1–40)
BASOPHILS # BLD AUTO: 0.01 10*3/MM3 (ref 0–0.2)
BASOPHILS NFR BLD AUTO: 0.2 % (ref 0–1.5)
BILIRUB SERPL-MCNC: 0.6 MG/DL (ref 0–1.2)
BUN SERPL-MCNC: 14.9 MG/DL (ref 8–23)
BUN/CREAT SERPL: 13.4 (ref 7–25)
CALCIUM SPEC-SCNC: 9.3 MG/DL (ref 8.6–10.5)
CHLORIDE SERPL-SCNC: 102 MMOL/L (ref 98–107)
CO2 SERPL-SCNC: 22.8 MMOL/L (ref 22–29)
CREAT SERPL-MCNC: 1.11 MG/DL (ref 0.76–1.27)
DEPRECATED RDW RBC AUTO: 46 FL (ref 37–54)
EGFRCR SERPLBLD CKD-EPI 2021: 65.5 ML/MIN/1.73
EOSINOPHIL # BLD AUTO: 0.01 10*3/MM3 (ref 0–0.4)
EOSINOPHIL NFR BLD AUTO: 0.2 % (ref 0.3–6.2)
ERYTHROCYTE [DISTWIDTH] IN BLOOD BY AUTOMATED COUNT: 12.4 % (ref 12.3–15.4)
GLOBULIN UR ELPH-MCNC: 3.2 GM/DL
GLUCOSE SERPL-MCNC: 176 MG/DL (ref 65–99)
HCT VFR BLD AUTO: 32.8 % (ref 37.5–51)
HGB BLD-MCNC: 10.9 G/DL (ref 13–17.7)
IMM GRANULOCYTES # BLD AUTO: 0.01 10*3/MM3 (ref 0–0.05)
IMM GRANULOCYTES NFR BLD AUTO: 0.2 % (ref 0–0.5)
LYMPHOCYTES # BLD AUTO: 1.62 10*3/MM3 (ref 0.7–3.1)
LYMPHOCYTES NFR BLD AUTO: 34.5 % (ref 19.6–45.3)
MCH RBC QN AUTO: 34 PG (ref 26.6–33)
MCHC RBC AUTO-ENTMCNC: 33.2 G/DL (ref 31.5–35.7)
MCV RBC AUTO: 102.2 FL (ref 79–97)
MONOCYTES # BLD AUTO: 0.33 10*3/MM3 (ref 0.1–0.9)
MONOCYTES NFR BLD AUTO: 7 % (ref 5–12)
NEUTROPHILS NFR BLD AUTO: 2.71 10*3/MM3 (ref 1.7–7)
NEUTROPHILS NFR BLD AUTO: 57.9 % (ref 42.7–76)
NRBC BLD AUTO-RTO: 0 /100 WBC (ref 0–0.2)
PLATELET # BLD AUTO: 236 10*3/MM3 (ref 140–450)
PMV BLD AUTO: 8.7 FL (ref 6–12)
POTASSIUM SERPL-SCNC: 3.8 MMOL/L (ref 3.5–5.2)
PROT SERPL-MCNC: 7.3 G/DL (ref 6–8.5)
RBC # BLD AUTO: 3.21 10*6/MM3 (ref 4.14–5.8)
SODIUM SERPL-SCNC: 137 MMOL/L (ref 136–145)
WBC NRBC COR # BLD AUTO: 4.69 10*3/MM3 (ref 3.4–10.8)

## 2025-06-30 PROCEDURE — 80053 COMPREHEN METABOLIC PANEL: CPT

## 2025-06-30 PROCEDURE — 85025 COMPLETE CBC W/AUTO DIFF WBC: CPT

## 2025-06-30 PROCEDURE — 36415 COLL VENOUS BLD VENIPUNCTURE: CPT

## 2025-07-02 LAB
ALBUMIN SERPL ELPH-MCNC: 3.7 G/DL (ref 2.9–4.4)
ALBUMIN/GLOB SERPL: 1.1 {RATIO} (ref 0.7–1.7)
ALPHA1 GLOB SERPL ELPH-MCNC: 0.2 G/DL (ref 0–0.4)
ALPHA2 GLOB SERPL ELPH-MCNC: 0.7 G/DL (ref 0.4–1)
B-GLOBULIN SERPL ELPH-MCNC: 0.8 G/DL (ref 0.7–1.3)
GAMMA GLOB SERPL ELPH-MCNC: 1.7 G/DL (ref 0.4–1.8)
GLOBULIN SER-MCNC: 3.4 G/DL (ref 2.2–3.9)
IGA SERPL-MCNC: 18 MG/DL (ref 61–437)
IGG SERPL-MCNC: 2043 MG/DL (ref 603–1613)
IGM SERPL-MCNC: 38 MG/DL (ref 15–143)
INTERPRETATION SERPL IEP-IMP: ABNORMAL
KAPPA LC FREE SER-MCNC: 34.9 MG/L (ref 3.3–19.4)
KAPPA LC FREE/LAMBDA FREE SER: 10.58 {RATIO} (ref 0.26–1.65)
LABORATORY COMMENT REPORT: ABNORMAL
LAMBDA LC FREE SERPL-MCNC: 3.3 MG/L (ref 5.7–26.3)
M PROTEIN SERPL ELPH-MCNC: 1.6 G/DL
PROT SERPL-MCNC: 7.1 G/DL (ref 6–8.5)

## 2025-07-15 ENCOUNTER — APPOINTMENT (OUTPATIENT)
Dept: LAB | Facility: HOSPITAL | Age: 85
End: 2025-07-15
Payer: MEDICARE

## 2025-07-15 ENCOUNTER — OFFICE VISIT (OUTPATIENT)
Dept: ONCOLOGY | Facility: CLINIC | Age: 85
End: 2025-07-15
Payer: MEDICARE

## 2025-07-15 VITALS
HEART RATE: 81 BPM | OXYGEN SATURATION: 94 % | TEMPERATURE: 97.6 F | WEIGHT: 186 LBS | BODY MASS INDEX: 26.63 KG/M2 | HEIGHT: 70 IN | RESPIRATION RATE: 17 BRPM | DIASTOLIC BLOOD PRESSURE: 103 MMHG | SYSTOLIC BLOOD PRESSURE: 142 MMHG

## 2025-07-15 DIAGNOSIS — D47.2 SMOLDERING MYELOMA: Primary | ICD-10-CM

## 2025-07-15 DIAGNOSIS — N18.31 ANEMIA IN STAGE 3A CHRONIC KIDNEY DISEASE: ICD-10-CM

## 2025-07-15 DIAGNOSIS — D53.9 MACROCYTIC ANEMIA: ICD-10-CM

## 2025-07-15 DIAGNOSIS — D63.1 ANEMIA IN STAGE 3A CHRONIC KIDNEY DISEASE: ICD-10-CM

## 2025-07-15 DIAGNOSIS — C90.00 MULTIPLE MYELOMA, REMISSION STATUS UNSPECIFIED: ICD-10-CM

## 2025-07-15 LAB
BASOPHILS # BLD AUTO: 0.01 10*3/MM3 (ref 0–0.2)
BASOPHILS NFR BLD AUTO: 0.2 % (ref 0–1.5)
DEPRECATED RDW RBC AUTO: 44.7 FL (ref 37–54)
EOSINOPHIL # BLD AUTO: 0.03 10*3/MM3 (ref 0–0.4)
EOSINOPHIL NFR BLD AUTO: 0.7 % (ref 0.3–6.2)
ERYTHROCYTE [DISTWIDTH] IN BLOOD BY AUTOMATED COUNT: 12.3 % (ref 12.3–15.4)
FERRITIN SERPL-MCNC: 99.5 NG/ML (ref 30–400)
FOLATE SERPL-MCNC: 17.4 NG/ML (ref 4.78–24.2)
HCT VFR BLD AUTO: 31.9 % (ref 37.5–51)
HGB BLD-MCNC: 11.1 G/DL (ref 13–17.7)
HGB RETIC QN AUTO: 40 PG (ref 29.8–36.1)
IMM GRANULOCYTES # BLD AUTO: 0.01 10*3/MM3 (ref 0–0.05)
IMM GRANULOCYTES NFR BLD AUTO: 0.2 % (ref 0–0.5)
IMM RETICS NFR: 19.6 % (ref 3–15.8)
IRON 24H UR-MRATE: 76 MCG/DL (ref 59–158)
IRON SATN MFR SERPL: 28 % (ref 20–50)
LYMPHOCYTES # BLD AUTO: 1.98 10*3/MM3 (ref 0.7–3.1)
LYMPHOCYTES NFR BLD AUTO: 48.3 % (ref 19.6–45.3)
MCH RBC QN AUTO: 34.4 PG (ref 26.6–33)
MCHC RBC AUTO-ENTMCNC: 34.8 G/DL (ref 31.5–35.7)
MCV RBC AUTO: 98.8 FL (ref 79–97)
MONOCYTES # BLD AUTO: 0.36 10*3/MM3 (ref 0.1–0.9)
MONOCYTES NFR BLD AUTO: 8.8 % (ref 5–12)
NEUTROPHILS NFR BLD AUTO: 1.71 10*3/MM3 (ref 1.7–7)
NEUTROPHILS NFR BLD AUTO: 41.8 % (ref 42.7–76)
NRBC BLD AUTO-RTO: 0 /100 WBC (ref 0–0.2)
PLATELET # BLD AUTO: 211 10*3/MM3 (ref 140–450)
PMV BLD AUTO: 8.8 FL (ref 6–12)
RBC # BLD AUTO: 3.23 10*6/MM3 (ref 4.14–5.8)
RETICS # AUTO: 0.08 10*6/MM3 (ref 0.02–0.13)
RETICS/RBC NFR AUTO: 2.47 % (ref 0.7–1.9)
TIBC SERPL-MCNC: 271 MCG/DL (ref 298–536)
TRANSFERRIN SERPL-MCNC: 182 MG/DL (ref 200–360)
VIT B12 BLD-MCNC: >2000 PG/ML (ref 211–946)
WBC NRBC COR # BLD AUTO: 4.1 10*3/MM3 (ref 3.4–10.8)

## 2025-07-15 PROCEDURE — 85025 COMPLETE CBC W/AUTO DIFF WBC: CPT | Performed by: NURSE PRACTITIONER

## 2025-07-15 PROCEDURE — 82746 ASSAY OF FOLIC ACID SERUM: CPT | Performed by: NURSE PRACTITIONER

## 2025-07-15 PROCEDURE — 83540 ASSAY OF IRON: CPT | Performed by: NURSE PRACTITIONER

## 2025-07-15 PROCEDURE — 82607 VITAMIN B-12: CPT | Performed by: NURSE PRACTITIONER

## 2025-07-15 PROCEDURE — 85046 RETICYTE/HGB CONCENTRATE: CPT | Performed by: NURSE PRACTITIONER

## 2025-07-15 PROCEDURE — 82728 ASSAY OF FERRITIN: CPT | Performed by: NURSE PRACTITIONER

## 2025-07-15 PROCEDURE — 84466 ASSAY OF TRANSFERRIN: CPT | Performed by: NURSE PRACTITIONER

## 2025-07-15 NOTE — PROGRESS NOTES
Subjective     REASON FOR FOLLOW-UP:  Chronic leukopenia, smoldering myeloma, anemia                             REQUESTING PHYSICIAN:  Eduardo    History of Present Illness   This is a very pleasant 85 y.o. man returning today for 6 month follow-up of smoldering myeloma.  Patient actually had his labs drawn 2 weeks ago in preparation for today.  We discussed that while his myeloma remains quiet, he is notably more anemic with macrocytic indices.  Specifically Hgb is down to 10.9, .  Patient does note about 6 weeks ago having right hip replacement surgery through an outpatient surgical center in [place].  He continues with PT but is doing quite well, ambulating without assistance.  I did recommend we repeat a CBC today to reassess his hemoglobin along with checking vitamins for potential deficiencies.  He and his wife are in agreement.      Past Medical History:   Diagnosis Date    Arthritis     hands    CAD (coronary artery disease)     COVID-19     Diabetes     GERD (gastroesophageal reflux disease)     H/O MGUS (monoclonal gammopathy of unknown significance)     History of colon polyps     History of herpes zoster 2007    History of pneumonia     Hyperlipidemia     Hypertension     Neutropenia     H/O hereditary neutropenia    Prostate cancer 2007    Stroke 1997        Past Surgical History:   Procedure Laterality Date    ANAL FISSURECTOMY      CATARACT EXTRACTION Bilateral 2009    COLONOSCOPY  06/2014    cecal polyp, sigmoid diverticulosis    ENDOSCOPY  2001    OTHER SURGICAL HISTORY  2007    Decortication and right lower lobe with resection    PROSTATE BIOPSY  2007    THORACOSCOPY          Current Outpatient Medications on File Prior to Visit   Medication Sig Dispense Refill    acetaminophen (TYLENOL) 500 MG tablet Take 1 tablet by mouth Every 6 (Six) Hours As Needed for Mild Pain . 100 tablet 0    albuterol sulfate  (90 Base) MCG/ACT inhaler Inhale 2 puffs Every 4 (Four) Hours As Needed for  Wheezing. 1 g 0    amLODIPine (NORVASC) 2.5 MG tablet Take 1 tablet by mouth Daily.      atorvastatin (LIPITOR) 40 MG tablet Daily.      clopidogrel (PLAVIX) 75 MG tablet Take 1 tablet by mouth.      clotrimazole (LOTRIMIN) 1 % cream       docusate sodium 100 MG capsule docusate sodium 100 mg capsule   Take 1 capsule every day by oral route.      DULoxetine (CYMBALTA) 20 MG capsule TAKE 1 CAPSULE BY MOUTH TWICE DAILY 180 capsule 3    esomeprazole (nexIUM) 40 MG capsule Take 1 capsule by mouth Every Morning Before Breakfast.      fluticasone (FLONASE) 50 MCG/ACT nasal spray       HYDROcodone-acetaminophen (NORCO) 5-325 MG per tablet Take 1 tablet by mouth Every 6 (Six) Hours As Needed.      ibuprofen (ADVIL,MOTRIN) 200 MG tablet Take 1 tablet by mouth Every 6 (Six) Hours As Needed for Mild Pain.      irbesartan-hydrochlorothiazide (AVALIDE) 300-12.5 MG tablet       loratadine (CLARITIN) 10 MG tablet Take 1 tablet by mouth Daily. 30 tablet 0    metFORMIN (GLUCOPHAGE) 1000 MG tablet Take 1 tablet by mouth Daily.      Multiple Vitamins-Minerals (MULTIVITAMIN ADULTS 50+ PO) Take  by mouth Daily.      pregabalin (LYRICA) 50 MG capsule Take 2 capsules by mouth Daily.      sildenafil (REVATIO) 20 MG tablet TK 2 TS PO QD PRN  3    zolpidem (AMBIEN) 5 MG tablet Take 1 tablet by mouth every night at bedtime.       No current facility-administered medications on file prior to visit.        ALLERGIES:    Allergies   Allergen Reactions    Fluoxetine Unknown - Low Severity    Gabapentin Unknown - Low Severity        Social History     Socioeconomic History    Marital status:      Spouse name: Candace    Years of education: High school   Tobacco Use    Smoking status: Former   Vaping Use    Vaping status: Never Used   Substance and Sexual Activity    Alcohol use: Yes     Comment: Occasionally    Drug use: No    Sexual activity: Defer        Family History   Problem Relation Age of Onset    Uterine cancer Mother      "Hypertension Other     Hyperlipidemia Other     Arthritis Other     Stroke Other     Heart disease Sister     Hypertension Brother         Review of Systems   Constitutional:  Negative for activity change and fatigue.   HENT: Negative.     Eyes: Negative.    Respiratory: Negative.     Cardiovascular: Negative.    Gastrointestinal: Negative.    Genitourinary: Negative.    Musculoskeletal:  Positive for arthralgias, back pain and gait problem.   Skin: Negative.    Allergic/Immunologic: Negative.    Hematological: Negative.    Psychiatric/Behavioral: Negative.       Objective     Vitals:    07/15/25 1220   BP: (!) 142/103   Pulse: 81   Resp: 17   Temp: 97.6 °F (36.4 °C)   TempSrc: Oral   SpO2: 94%   Weight: 84.4 kg (186 lb)   Height: 177.8 cm (70\")   PainSc: 0-No pain             7/15/2025    12:20 PM   Current Status   ECOG score 1       Physical Exam    Con: pleasant, well-appearing man  HEENT: no icterus, no thrush, moist membranes  CV: RRR, S1S2  RESP: CTA bilat, no wheezing  GI: soft, nontender, no splenomegaly, +BS  MS: no edema, ambulates slight stooped posture  SKIN: no petechiae or bruising  NEURO: alert and oriented x3, normal strength, normal muscle tone  PSYCH: normal mood and affect  Exam is unchanged-1/8/25    RECENT LABS:  Hematology WBC   Date Value Ref Range Status   07/15/2025 4.10 3.40 - 10.80 10*3/mm3 Final     RBC   Date Value Ref Range Status   07/15/2025 3.23 (L) 4.14 - 5.80 10*6/mm3 Final     Hemoglobin   Date Value Ref Range Status   07/15/2025 11.1 (L) 13.0 - 17.7 g/dL Final     Hematocrit   Date Value Ref Range Status   07/15/2025 31.9 (L) 37.5 - 51.0 % Final     Platelets   Date Value Ref Range Status   07/15/2025 211 140 - 450 10*3/mm3 Final        Lab Results   Component Value Date    GLUCOSE 176 (H) 06/30/2025    BUN 14.9 06/30/2025    CREATININE 1.11 06/30/2025    EGFRIFAFRI 72 09/30/2021    BCR 13.4 06/30/2025    K 3.8 06/30/2025    CO2 22.8 06/30/2025    CALCIUM 9.3 06/30/2025    " ALBUMIN 4.1 06/30/2025    ALBUMIN 3.7 06/30/2025    AST 22 06/30/2025    ALT 29 06/30/2025             Assessment & Plan     * Chronic leukopenia:   ANC has run borderline low for a number of years.  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    Bone marrow exam was performed 1/11/19.  The bone marrow was mildly hypercellular 40% with involvement of a plasma cell dyscrasia 8% by aspirate and 15% by core biopsy.  Plasma cells were monoclonal kappa by flow cytometry.  There was trilineage hematopoiesis.  [place] red stain was negative for amyloid deposition.  Cytogenetics were normal; complete FISH panel could not be performed.  No dyspoiesis of the white cells noted.  7/15/2025 WBC 4.1, ANC 0.71.  Stable.    *Smoldering myeloma:    increased number of plasma cells by bone marrow biopsy 8% aspirate/15% core biopsy monoclonal kappa.  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    7/15/2025 repeat labs recently done 6/30/2025 showing M spike stable at 1.6 g/dL, light chain ratio 10.58.     *Anemia:    Patient has stage II-III 3A chronic kidney disease.    10/2018 Iron studies, B12, folic acid without obvious deficiency.    6/30/2025 Hgb down to 10.9, .    7/15/2025 today recommended repeating CBC as well as checking repeat anemia labs in light of changes noted on recent labs.  Of note patient did have surgery roughly 6 weeks ago for right hip replacement.  Repeat CBC revealing Hgb of 11.1, MCV 98.  Ferritin 99, iron sat 28%.  B12 and folate pending. Of note, most recent CMP showed stable renal function with creat 1.1, eGFR 65 (placing him in Stage II CKD).    *Lumbar back pain  MRI lumbar spine 1/4/2024 reviewed-multilevel degenerative disease of the lumbar spine, moderate to severe L4-L5 and severe L5-S1  foraminal stenosis, no evidence of malignancy  He received epidural injections without much help now undergoing PT      Hematology plan/recommendations:  Patient sent back to lab today to draw additional labs including CBC, ferritin, iron profile, B12, folate, retic.  Will call patient with pending results.  Continue observation for smoldering myeloma-6-month follow-up with repeat CBC CMP SPEP/LILLY and free light chain ratio requested    I spent 35 minutes caring for Antoine on this date of service. This time includes time spent by me in the following activities: preparing for the visit, reviewing tests, performing a medically appropriate examination and/or evaluation, counseling and educating the patient/family/caregiver, referring and communicating with other health care professionals, documenting information in the medical record, independently interpreting results and communicating that information with the patient/family/caregiver, care coordination, ordering test(s), obtaining a separately obtained history, and reviewing a separately obtained history

## 2025-08-28 ENCOUNTER — OFFICE VISIT (OUTPATIENT)
Dept: SURGERY | Facility: CLINIC | Age: 85
End: 2025-08-28
Payer: MEDICARE

## 2025-08-28 VITALS
WEIGHT: 184.2 LBS | HEIGHT: 70 IN | HEART RATE: 66 BPM | SYSTOLIC BLOOD PRESSURE: 136 MMHG | BODY MASS INDEX: 26.37 KG/M2 | DIASTOLIC BLOOD PRESSURE: 76 MMHG | OXYGEN SATURATION: 98 %

## 2025-08-28 DIAGNOSIS — L72.0 EPIDERMOID CYST: Primary | ICD-10-CM
